# Patient Record
Sex: MALE | Race: WHITE | NOT HISPANIC OR LATINO | Employment: OTHER | URBAN - METROPOLITAN AREA
[De-identification: names, ages, dates, MRNs, and addresses within clinical notes are randomized per-mention and may not be internally consistent; named-entity substitution may affect disease eponyms.]

---

## 2023-07-13 ENCOUNTER — APPOINTMENT (OUTPATIENT)
Dept: RADIOLOGY | Facility: MEDICAL CENTER | Age: 78
DRG: 023 | End: 2023-07-13
Attending: PSYCHIATRY & NEUROLOGY
Payer: MEDICARE

## 2023-07-13 ENCOUNTER — APPOINTMENT (OUTPATIENT)
Dept: RADIOLOGY | Facility: MEDICAL CENTER | Age: 78
DRG: 023 | End: 2023-07-13
Attending: STUDENT IN AN ORGANIZED HEALTH CARE EDUCATION/TRAINING PROGRAM
Payer: MEDICARE

## 2023-07-13 ENCOUNTER — HOSPITAL ENCOUNTER (INPATIENT)
Facility: MEDICAL CENTER | Age: 78
LOS: 13 days | DRG: 023 | End: 2023-07-27
Attending: STUDENT IN AN ORGANIZED HEALTH CARE EDUCATION/TRAINING PROGRAM | Admitting: INTERNAL MEDICINE
Payer: MEDICARE

## 2023-07-13 DIAGNOSIS — I63.512 ACUTE ISCHEMIC LEFT MCA STROKE (HCC): ICD-10-CM

## 2023-07-13 DIAGNOSIS — R29.810 FACIAL DROOP: ICD-10-CM

## 2023-07-13 DIAGNOSIS — I63.9 ACUTE CVA (CEREBROVASCULAR ACCIDENT) (HCC): ICD-10-CM

## 2023-07-13 DIAGNOSIS — R58 BLOOD LOSS: ICD-10-CM

## 2023-07-13 DIAGNOSIS — R53.1 WEAKNESS: ICD-10-CM

## 2023-07-13 DIAGNOSIS — R47.1 DYSARTHRIA: ICD-10-CM

## 2023-07-13 DIAGNOSIS — I65.22 LEFT CAROTID ARTERY STENOSIS: ICD-10-CM

## 2023-07-13 LAB
ABO GROUP BLD: NORMAL
ALBUMIN SERPL BCP-MCNC: 4.3 G/DL (ref 3.2–4.9)
ALBUMIN/GLOB SERPL: 1.8 G/DL
ALP SERPL-CCNC: 73 U/L (ref 30–99)
ALT SERPL-CCNC: 18 U/L (ref 2–50)
ANION GAP SERPL CALC-SCNC: 9 MMOL/L (ref 7–16)
APTT PPP: 28.4 SEC (ref 24.7–36)
AST SERPL-CCNC: 26 U/L (ref 12–45)
BASOPHILS # BLD AUTO: 0.7 % (ref 0–1.8)
BASOPHILS # BLD: 0.04 K/UL (ref 0–0.12)
BILIRUB SERPL-MCNC: 0.4 MG/DL (ref 0.1–1.5)
BLD GP AB SCN SERPL QL: NORMAL
BUN SERPL-MCNC: 25 MG/DL (ref 8–22)
CALCIUM ALBUM COR SERPL-MCNC: 9.3 MG/DL (ref 8.5–10.5)
CALCIUM SERPL-MCNC: 9.5 MG/DL (ref 8.5–10.5)
CHLORIDE SERPL-SCNC: 101 MMOL/L (ref 96–112)
CO2 SERPL-SCNC: 26 MMOL/L (ref 20–33)
CREAT SERPL-MCNC: 1.15 MG/DL (ref 0.5–1.4)
EOSINOPHIL # BLD AUTO: 0.17 K/UL (ref 0–0.51)
EOSINOPHIL NFR BLD: 3 % (ref 0–6.9)
ERYTHROCYTE [DISTWIDTH] IN BLOOD BY AUTOMATED COUNT: 40.6 FL (ref 35.9–50)
GFR SERPLBLD CREATININE-BSD FMLA CKD-EPI: 66 ML/MIN/1.73 M 2
GLOBULIN SER CALC-MCNC: 2.4 G/DL (ref 1.9–3.5)
GLUCOSE SERPL-MCNC: 117 MG/DL (ref 65–99)
HCT VFR BLD AUTO: 41.2 % (ref 42–52)
HGB BLD-MCNC: 14.6 G/DL (ref 14–18)
IMM GRANULOCYTES # BLD AUTO: 0.03 K/UL (ref 0–0.11)
IMM GRANULOCYTES NFR BLD AUTO: 0.5 % (ref 0–0.9)
INR PPP: 1.11 (ref 0.87–1.13)
LYMPHOCYTES # BLD AUTO: 1.53 K/UL (ref 1–4.8)
LYMPHOCYTES NFR BLD: 26.9 % (ref 22–41)
MCH RBC QN AUTO: 31.1 PG (ref 27–33)
MCHC RBC AUTO-ENTMCNC: 35.4 G/DL (ref 32.3–36.5)
MCV RBC AUTO: 87.7 FL (ref 81.4–97.8)
MONOCYTES # BLD AUTO: 0.58 K/UL (ref 0–0.85)
MONOCYTES NFR BLD AUTO: 10.2 % (ref 0–13.4)
NEUTROPHILS # BLD AUTO: 3.33 K/UL (ref 1.82–7.42)
NEUTROPHILS NFR BLD: 58.7 % (ref 44–72)
NRBC # BLD AUTO: 0 K/UL
NRBC BLD-RTO: 0 /100 WBC (ref 0–0.2)
PLATELET # BLD AUTO: 185 K/UL (ref 164–446)
PMV BLD AUTO: 9.4 FL (ref 9–12.9)
POTASSIUM SERPL-SCNC: 4.3 MMOL/L (ref 3.6–5.5)
PROT SERPL-MCNC: 6.7 G/DL (ref 6–8.2)
PROTHROMBIN TIME: 14.2 SEC (ref 12–14.6)
RBC # BLD AUTO: 4.7 M/UL (ref 4.7–6.1)
RH BLD: NORMAL
SODIUM SERPL-SCNC: 136 MMOL/L (ref 135–145)
TROPONIN T SERPL-MCNC: 13 NG/L (ref 6–19)
WBC # BLD AUTO: 5.7 K/UL (ref 4.8–10.8)

## 2023-07-13 PROCEDURE — 84484 ASSAY OF TROPONIN QUANT: CPT

## 2023-07-13 PROCEDURE — 700111 HCHG RX REV CODE 636 W/ 250 OVERRIDE (IP): Mod: JW | Performed by: PSYCHIATRY & NEUROLOGY

## 2023-07-13 PROCEDURE — 85610 PROTHROMBIN TIME: CPT

## 2023-07-13 PROCEDURE — 86850 RBC ANTIBODY SCREEN: CPT

## 2023-07-13 PROCEDURE — 85347 COAGULATION TIME ACTIVATED: CPT

## 2023-07-13 PROCEDURE — 86901 BLOOD TYPING SEROLOGIC RH(D): CPT

## 2023-07-13 PROCEDURE — 700117 HCHG RX CONTRAST REV CODE 255: Performed by: STUDENT IN AN ORGANIZED HEALTH CARE EDUCATION/TRAINING PROGRAM

## 2023-07-13 PROCEDURE — 85576 BLOOD PLATELET AGGREGATION: CPT | Mod: 91

## 2023-07-13 PROCEDURE — 70498 CT ANGIOGRAPHY NECK: CPT

## 2023-07-13 PROCEDURE — 0042T CT-CEREBRAL PERFUSION ANALYSIS: CPT

## 2023-07-13 PROCEDURE — 93005 ELECTROCARDIOGRAM TRACING: CPT | Performed by: STUDENT IN AN ORGANIZED HEALTH CARE EDUCATION/TRAINING PROGRAM

## 2023-07-13 PROCEDURE — 71045 X-RAY EXAM CHEST 1 VIEW: CPT

## 2023-07-13 PROCEDURE — 85025 COMPLETE CBC W/AUTO DIFF WBC: CPT

## 2023-07-13 PROCEDURE — 80053 COMPREHEN METABOLIC PANEL: CPT

## 2023-07-13 PROCEDURE — 85384 FIBRINOGEN ACTIVITY: CPT

## 2023-07-13 PROCEDURE — 36415 COLL VENOUS BLD VENIPUNCTURE: CPT

## 2023-07-13 PROCEDURE — 99285 EMERGENCY DEPT VISIT HI MDM: CPT | Performed by: PSYCHIATRY & NEUROLOGY

## 2023-07-13 PROCEDURE — 70450 CT HEAD/BRAIN W/O DYE: CPT

## 2023-07-13 PROCEDURE — 99291 CRITICAL CARE FIRST HOUR: CPT

## 2023-07-13 PROCEDURE — 37195 THROMBOLYTIC THERAPY STROKE: CPT

## 2023-07-13 PROCEDURE — 70496 CT ANGIOGRAPHY HEAD: CPT

## 2023-07-13 PROCEDURE — 86900 BLOOD TYPING SEROLOGIC ABO: CPT

## 2023-07-13 PROCEDURE — 85730 THROMBOPLASTIN TIME PARTIAL: CPT

## 2023-07-13 RX ADMIN — TENECTEPLASE 23 MG: KIT at 23:31

## 2023-07-13 RX ADMIN — IOHEXOL 40 ML: 350 INJECTION, SOLUTION INTRAVENOUS at 23:10

## 2023-07-13 RX ADMIN — IOHEXOL 80 ML: 350 INJECTION, SOLUTION INTRAVENOUS at 23:12

## 2023-07-14 ENCOUNTER — APPOINTMENT (OUTPATIENT)
Dept: RADIOLOGY | Facility: MEDICAL CENTER | Age: 78
DRG: 023 | End: 2023-07-14
Attending: INTERNAL MEDICINE
Payer: MEDICARE

## 2023-07-14 ENCOUNTER — APPOINTMENT (OUTPATIENT)
Dept: CARDIOLOGY | Facility: MEDICAL CENTER | Age: 78
DRG: 023 | End: 2023-07-14
Attending: INTERNAL MEDICINE
Payer: MEDICARE

## 2023-07-14 PROBLEM — I65.22 LEFT CAROTID ARTERY STENOSIS: Status: ACTIVE | Noted: 2023-07-14

## 2023-07-14 PROBLEM — I16.0 HYPERTENSIVE URGENCY: Status: ACTIVE | Noted: 2023-07-14

## 2023-07-14 PROBLEM — I63.512 ACUTE ISCHEMIC LEFT MCA STROKE (HCC): Status: ACTIVE | Noted: 2023-07-14

## 2023-07-14 LAB
ABO + RH BLD: NORMAL
ANION GAP SERPL CALC-SCNC: 11 MMOL/L (ref 7–16)
BUN SERPL-MCNC: 20 MG/DL (ref 8–22)
CALCIUM SERPL-MCNC: 9.2 MG/DL (ref 8.5–10.5)
CFT BLD TEG: 5.9 MIN (ref 4.6–9.1)
CFT P HPASE BLD TEG: 6.5 MIN (ref 4.3–8.3)
CHLORIDE SERPL-SCNC: 103 MMOL/L (ref 96–112)
CHOLEST SERPL-MCNC: 161 MG/DL (ref 100–199)
CLOT ANGLE BLD TEG: 72.5 DEGREES (ref 63–78)
CO2 SERPL-SCNC: 23 MMOL/L (ref 20–33)
CREAT SERPL-MCNC: 0.97 MG/DL (ref 0.5–1.4)
CT.EXTRINSIC BLD ROTEM: 1.3 MIN (ref 0.8–2.1)
EKG IMPRESSION: NORMAL
GFR SERPLBLD CREATININE-BSD FMLA CKD-EPI: 81 ML/MIN/1.73 M 2
GLUCOSE SERPL-MCNC: 115 MG/DL (ref 65–99)
HDLC SERPL-MCNC: 44 MG/DL
LDLC SERPL CALC-MCNC: 104 MG/DL
LV EJECT FRACT  99904: 60
LV EJECT FRACT MOD 2C 99903: 73.75
LV EJECT FRACT MOD 4C 99902: 59.84
LV EJECT FRACT MOD BP 99901: 67.22
MAGNESIUM SERPL-MCNC: 2.1 MG/DL (ref 1.5–2.5)
MCF BLD TEG: 59.1 MM (ref 52–69)
MCF.PLATELET INHIB BLD ROTEM: 20.7 MM (ref 15–32)
PA AA BLD-ACNC: 13.6 % (ref 0–11)
PA ADP BLD-ACNC: 7.2 % (ref 0–17)
POTASSIUM SERPL-SCNC: 3.5 MMOL/L (ref 3.6–5.5)
SODIUM SERPL-SCNC: 137 MMOL/L (ref 135–145)
TEG ALGORITHM TGALG: ABNORMAL
TRIGL SERPL-MCNC: 67 MG/DL (ref 0–149)

## 2023-07-14 PROCEDURE — 700111 HCHG RX REV CODE 636 W/ 250 OVERRIDE (IP): Mod: JZ | Performed by: PSYCHIATRY & NEUROLOGY

## 2023-07-14 PROCEDURE — 700101 HCHG RX REV CODE 250: Performed by: NURSE PRACTITIONER

## 2023-07-14 PROCEDURE — 70551 MRI BRAIN STEM W/O DYE: CPT

## 2023-07-14 PROCEDURE — A9270 NON-COVERED ITEM OR SERVICE: HCPCS | Performed by: PSYCHIATRY & NEUROLOGY

## 2023-07-14 PROCEDURE — 99292 CRITICAL CARE ADDL 30 MIN: CPT | Performed by: INTERNAL MEDICINE

## 2023-07-14 PROCEDURE — 93306 TTE W/DOPPLER COMPLETE: CPT

## 2023-07-14 PROCEDURE — C1760 CLOSURE DEV, VASC: HCPCS

## 2023-07-14 PROCEDURE — 93306 TTE W/DOPPLER COMPLETE: CPT | Mod: 26 | Performed by: INTERNAL MEDICINE

## 2023-07-14 PROCEDURE — 80048 BASIC METABOLIC PNL TOTAL CA: CPT

## 2023-07-14 PROCEDURE — 99291 CRITICAL CARE FIRST HOUR: CPT | Performed by: INTERNAL MEDICINE

## 2023-07-14 PROCEDURE — 03CL3ZZ EXTIRPATION OF MATTER FROM LEFT INTERNAL CAROTID ARTERY, PERCUTANEOUS APPROACH: ICD-10-PCS | Performed by: RADIOLOGY

## 2023-07-14 PROCEDURE — 700105 HCHG RX REV CODE 258: Mod: JZ | Performed by: NURSE PRACTITIONER

## 2023-07-14 PROCEDURE — 99233 SBSQ HOSP IP/OBS HIGH 50: CPT | Performed by: PSYCHIATRY & NEUROLOGY

## 2023-07-14 PROCEDURE — 70450 CT HEAD/BRAIN W/O DYE: CPT

## 2023-07-14 PROCEDURE — 700102 HCHG RX REV CODE 250 W/ 637 OVERRIDE(OP): Performed by: PSYCHIATRY & NEUROLOGY

## 2023-07-14 PROCEDURE — B3171ZZ FLUOROSCOPY OF LEFT INTERNAL CAROTID ARTERY USING LOW OSMOLAR CONTRAST: ICD-10-PCS | Performed by: RADIOLOGY

## 2023-07-14 PROCEDURE — 80061 LIPID PANEL: CPT

## 2023-07-14 PROCEDURE — 770022 HCHG ROOM/CARE - ICU (200)

## 2023-07-14 PROCEDURE — 700117 HCHG RX CONTRAST REV CODE 255: Performed by: PSYCHIATRY & NEUROLOGY

## 2023-07-14 PROCEDURE — 92610 EVALUATE SWALLOWING FUNCTION: CPT

## 2023-07-14 PROCEDURE — 700111 HCHG RX REV CODE 636 W/ 250 OVERRIDE (IP)

## 2023-07-14 PROCEDURE — 83735 ASSAY OF MAGNESIUM: CPT

## 2023-07-14 RX ORDER — HYDRALAZINE HYDROCHLORIDE 20 MG/ML
10 INJECTION INTRAMUSCULAR; INTRAVENOUS
Status: DISCONTINUED | OUTPATIENT
Start: 2023-07-14 | End: 2023-07-14

## 2023-07-14 RX ORDER — HYDRALAZINE HYDROCHLORIDE 20 MG/ML
20 INJECTION INTRAMUSCULAR; INTRAVENOUS
Status: DISCONTINUED | OUTPATIENT
Start: 2023-07-14 | End: 2023-07-14

## 2023-07-14 RX ORDER — AMOXICILLIN 250 MG
2 CAPSULE ORAL 2 TIMES DAILY
Status: DISCONTINUED | OUTPATIENT
Start: 2023-07-14 | End: 2023-07-15

## 2023-07-14 RX ORDER — ONDANSETRON 2 MG/ML
4 INJECTION INTRAMUSCULAR; INTRAVENOUS EVERY 4 HOURS PRN
Status: DISCONTINUED | OUTPATIENT
Start: 2023-07-14 | End: 2023-07-27 | Stop reason: HOSPADM

## 2023-07-14 RX ORDER — POLYETHYLENE GLYCOL 3350 17 G/17G
1 POWDER, FOR SOLUTION ORAL
Status: DISCONTINUED | OUTPATIENT
Start: 2023-07-14 | End: 2023-07-15

## 2023-07-14 RX ORDER — HYDRALAZINE HYDROCHLORIDE 20 MG/ML
5 INJECTION INTRAMUSCULAR; INTRAVENOUS ONCE
Status: COMPLETED | OUTPATIENT
Start: 2023-07-14 | End: 2023-07-14

## 2023-07-14 RX ORDER — LABETALOL HYDROCHLORIDE 5 MG/ML
10 INJECTION, SOLUTION INTRAVENOUS
Status: DISCONTINUED | OUTPATIENT
Start: 2023-07-14 | End: 2023-07-14

## 2023-07-14 RX ORDER — NOREPINEPHRINE BITARTRATE 0.03 MG/ML
0-1 INJECTION, SOLUTION INTRAVENOUS CONTINUOUS
Status: DISCONTINUED | OUTPATIENT
Start: 2023-07-14 | End: 2023-07-17

## 2023-07-14 RX ORDER — HYDRALAZINE HYDROCHLORIDE 20 MG/ML
20 INJECTION INTRAMUSCULAR; INTRAVENOUS
Status: DISCONTINUED | OUTPATIENT
Start: 2023-07-14 | End: 2023-07-27 | Stop reason: HOSPADM

## 2023-07-14 RX ORDER — ATORVASTATIN CALCIUM 40 MG/1
40 TABLET, FILM COATED ORAL EVERY EVENING
Status: DISCONTINUED | OUTPATIENT
Start: 2023-07-14 | End: 2023-07-15

## 2023-07-14 RX ORDER — BISACODYL 10 MG
10 SUPPOSITORY, RECTAL RECTAL
Status: DISCONTINUED | OUTPATIENT
Start: 2023-07-14 | End: 2023-07-15

## 2023-07-14 RX ORDER — ENOXAPARIN SODIUM 100 MG/ML
40 INJECTION SUBCUTANEOUS DAILY
Status: DISCONTINUED | OUTPATIENT
Start: 2023-07-15 | End: 2023-07-27 | Stop reason: HOSPADM

## 2023-07-14 RX ORDER — ASPIRIN 300 MG/1
300 SUPPOSITORY RECTAL DAILY
Status: DISCONTINUED | OUTPATIENT
Start: 2023-07-15 | End: 2023-07-15

## 2023-07-14 RX ORDER — LABETALOL HYDROCHLORIDE 5 MG/ML
10-20 INJECTION, SOLUTION INTRAVENOUS
Status: DISCONTINUED | OUTPATIENT
Start: 2023-07-14 | End: 2023-07-14

## 2023-07-14 RX ORDER — LABETALOL HYDROCHLORIDE 5 MG/ML
10-20 INJECTION, SOLUTION INTRAVENOUS
Status: DISCONTINUED | OUTPATIENT
Start: 2023-07-14 | End: 2023-07-27 | Stop reason: HOSPADM

## 2023-07-14 RX ORDER — HYDRALAZINE HYDROCHLORIDE 20 MG/ML
INJECTION INTRAMUSCULAR; INTRAVENOUS
Status: COMPLETED
Start: 2023-07-14 | End: 2023-07-14

## 2023-07-14 RX ORDER — ONDANSETRON 4 MG/1
4 TABLET, ORALLY DISINTEGRATING ORAL EVERY 4 HOURS PRN
Status: DISCONTINUED | OUTPATIENT
Start: 2023-07-14 | End: 2023-07-15

## 2023-07-14 RX ORDER — ASPIRIN 81 MG/1
81 TABLET, CHEWABLE ORAL DAILY
Status: DISCONTINUED | OUTPATIENT
Start: 2023-07-15 | End: 2023-07-14

## 2023-07-14 RX ADMIN — NOREPINEPHRINE BITARTRATE 0.05 MCG/KG/MIN: 1 INJECTION, SOLUTION, CONCENTRATE INTRAVENOUS at 10:05

## 2023-07-14 RX ADMIN — HYDRALAZINE HYDROCHLORIDE 5 MG: 20 INJECTION INTRAMUSCULAR; INTRAVENOUS at 00:42

## 2023-07-14 RX ADMIN — ASPIRIN 300 MG: 300 SUPPOSITORY RECTAL at 23:53

## 2023-07-14 RX ADMIN — HYDRALAZINE HYDROCHLORIDE 5 MG: 20 INJECTION, SOLUTION INTRAMUSCULAR; INTRAVENOUS at 00:42

## 2023-07-14 RX ADMIN — ENOXAPARIN SODIUM 40 MG: 100 INJECTION SUBCUTANEOUS at 23:53

## 2023-07-14 RX ADMIN — IOHEXOL 40 ML: 300 INJECTION, SOLUTION INTRAVENOUS at 01:15

## 2023-07-14 RX ADMIN — FENTANYL CITRATE 25 MCG: 50 INJECTION, SOLUTION INTRAMUSCULAR; INTRAVENOUS at 00:27

## 2023-07-14 ASSESSMENT — ENCOUNTER SYMPTOMS
BRUISES/BLEEDS EASILY: 0
FLANK PAIN: 0
BACK PAIN: 0
FEVER: 0
SORE THROAT: 0
HEADACHES: 0
CHILLS: 0
DOUBLE VISION: 0
NERVOUS/ANXIOUS: 0
NECK PAIN: 0
DEPRESSION: 0
NAUSEA: 0
ABDOMINAL PAIN: 0
DIZZINESS: 0
SHORTNESS OF BREATH: 0
COUGH: 0
BLURRED VISION: 0
WEAKNESS: 0
VOMITING: 0

## 2023-07-14 ASSESSMENT — PAIN DESCRIPTION - PAIN TYPE
TYPE: ACUTE PAIN

## 2023-07-14 ASSESSMENT — FIBROSIS 4 INDEX: FIB4 SCORE: 2.48

## 2023-07-14 NOTE — CARE PLAN
The patient is Watcher - Medium risk of patient condition declining or worsening         Progress made toward(s) clinical / shift goals:  Neurologic assessments, groin/CMS checks performed and charted per post-thrombolytic/thrombectomy protocol. SBP goal 120-160. Neurologic exam waxing/waning. Stat CT performed for neuro change.     Patient is not progressing towards the following goals:      Problem: Optimal Care of the Stroke Patient  Goal: Optimal acute care for the stroke patient  Outcome: Progressing     Problem: Psychosocial - Patient Condition  Goal: Patient's ability to verbalize feelings about condition will improve  Outcome: Progressing     Problem: Urinary Elimination  Goal: Establish and maintain regular urinary output  Outcome: Progressing     Problem: Pain - Standard  Goal: Alleviation of pain or a reduction in pain to the patient’s comfort goal  Outcome: Progressing

## 2023-07-14 NOTE — PROGRESS NOTES
Radiology Progress Note   Author: RUTH Park Date & Time created: 7/14/2023  8:59 AM   Date of admission  7/13/2023  Note to reader: this note follows the APSO format rather than the historical SOAP format. Assessment and plan located at the top of the note for ease of use.    Chief Complaint  77 y.o. male admitted 7/13/2023 with   Chief Complaint   Patient presents with    Possible Stroke     BIB [facility] from his home in [place]. Pt had a GLF at 2100, no trauma noted. LKW 2120, pt was found to have right sided weakness, right facial droop and dysarthria. GCS 15.   No thinners          HPI  77-year-old male brought in due to right-sided weakness arm and leg and face.  Severe dysarthria.  Symptom onset witnessed by family at 2120 on 07/13/23. CT showed left MCA occlusion, CTA showed acute occlusion the distal left internal carotid artery and left M1 segment. Pt underwent a cerebral angiogram and mechanical thrombectomy of left MCA and Left ICA with TICI 2C recanalization on 07/13/23 with Dr Puri.     Interval History:   07/14/23 - Pt lethargic, with continued Right sided weakness and slurred speech. CTA this morning shows no acute infarct, hemorrhage or mass and no evidence of MCA reocclusion. Right femoral access site soft, non-tender; dressing cdi.       Assessment/Plan  Interval History   Principal Problem:    Acute ischemic left MCA stroke (HCC)  Active Problems:    Hypertensive urgency    Left carotid artery stenosis      Plan IR  - Neuro checks per ICU protocol  - Keep SBP between 120-160  - Secondary stroke prevention per Neurology recommendations  - Pending MRI   - Pending PT/OT/ Swallow eval   -  -Thank you for allowing Interventional Radiology team to participate in the patients care, if any additional care or requests are needed in the future please do not hesitate call or place IR order   [phone]      IR:            Review of Systems  Physical Exam   Review of Systems    Reason unable to perform ROS: lethargic.      Vitals:    07/14/23 0630   BP: 111/71   Pulse: 73   Resp: 18   Temp:    SpO2: 97%        Physical Exam  Cardiovascular:      Rate and Rhythm: Normal rate.      Pulses: Normal pulses.   Pulmonary:      Effort: Pulmonary effort is normal.   Abdominal:      General: Abdomen is flat.      Palpations: Abdomen is soft.   Musculoskeletal:      Comments: RUE 0/5  RLE 1/5  LUE 5/5  LLE 5/5   Skin:     General: Skin is warm and dry.      Comments: Right groin IR access site soft, non-tender; dressing cdi.    Neurological:      Mental Status: He is lethargic.      Comments: Right sided facial droop  Right sided weakness  L gaze preference  Lethargic but follows commands.   Slurred speech.              Labs    Recent Labs     07/13/23 2255   WBC 5.7   RBC 4.70   HEMOGLOBIN 14.6   HEMATOCRIT 41.2*   MCV 87.7   MCH 31.1   MCHC 35.4   RDW 40.6   PLATELETCT 185   MPV 9.4     Recent Labs     07/13/23 2255 07/14/23  0405   SODIUM 136 137   POTASSIUM 4.3 3.5*   CHLORIDE 101 103   CO2 26 23   GLUCOSE 117* 115*   BUN 25* 20   CREATININE 1.15 0.97   CALCIUM 9.5 9.2     Recent Labs     07/13/23 2255 07/14/23  0405   ALBUMIN 4.3  --    TBILIRUBIN 0.4  --    ALKPHOSPHAT 73  --    TOTPROTEIN 6.7  --    ALTSGPT 18  --    ASTSGOT 26  --    CREATININE 1.15 0.97     CT-HEAD W/O   Final Result      1. No CT evidence of acute infarct, hemorrhage or mass. No loss of gray-white matter differentiation.   2. No evidence of MCA reocclusion on this study.   3. Mild global parenchymal atrophy. Chronic small vessel ischemic changes.      DX-CHEST-PORTABLE (1 VIEW)   Final Result      No acute cardiopulmonary abnormality.         CT-CTA NECK WITH & W/O-POST PROCESSING   Final Result      1.  Sluggish flow in the distal left internal carotid artery, with occlusion of the carotid terminus.   2.  No high-grade stenosis or occlusion of the proximal left internal carotid artery.   3.  Patent right carotid and  vertebral arteries, without occlusion, aneurysm or high-grade stenosis.      CT-CTA HEAD WITH & W/O-POST PROCESS   Final Result      Acute occlusion the distal left internal carotid artery and left M1 segment.      CT-CEREBRAL PERFUSION ANALYSIS   Final Result      1.  Cerebral blood flow less than 30% likely representing completed infarct = 14 mL.      2.  T Max more than 6 seconds likely representing combination of completed infarct and ischemia = 194 mL.      3.  Mismatched volume likely representing ischemic brain/penumbra = 180 mL.      4.  Please note that the cerebral perfusion was performed on the limited brain tissue around the basal ganglia region. Infarct/ischemia outside the CT perfusion sections can be missed in this study.      CT-HEAD W/O   Final Result   Addendum (preliminary) 1 of 1   Findings were communicated to Dr. Fox via [employer] system on    7/13/2023 11:17 PM.      Final      1.  Hyperdense left MCA, consistent with left MCA occlusion.   2.  No loss of gray-white matter differentiation at this time.   3.  Mild atrophy and chronic small vessel ischemic changes.         IR-THROMBO MECHANICAL ARTERY,INIT    (Results Pending)   MR-BRAIN-W/O    (Results Pending)   CT-HEAD W/O    (Results Pending)   EC-ECHOCARDIOGRAM COMPLETE W/O CONT    (Results Pending)       INR   Date Value Ref Range Status   07/13/2023 1.11 0.87 - 1.13 Final     Comment:     INR - Non-therapeutic Reference Range: 0.87-1.13  INR - Therapeutic Reference Range: 2.0-4.0       No results found for: POCINR     Intake/Output Summary (Last 24 hours) at 7/14/2023 0859  Last data filed at 7/14/2023 0600  Gross per 24 hour   Intake 0 ml   Output 750 ml   Net -750 ml      Labs not explicitly included in this progress note were reviewed by the author. Radiology/imaging not explicitly included in this progress note was reviewed by the author.     I have performed a physical exam and reviewed and updated ROS and Plan today  (7/14/2023).     45 minutes in directly providing and coordinating care and extensive data review.  No time overlap and excludes procedures.

## 2023-07-14 NOTE — PROGRESS NOTES
Neurology Progress Note  Neurohospitalist Service, [facility]    Referring Physician: Rosemary Kirkpatrick M.D.    Chief Complaint   Patient presents with    Possible Stroke     BIB [facility] from his home in [place]. Pt had a GLF at 2100, no trauma noted. LKW 2120, pt was found to have right sided weakness, right facial droop and dysarthria. GCS 15.   No thinners        HPI: Refer to initial documented Neurology H&P, as detailed in the patient's chart.    Interval History 7/14: Per the nurse patient had sustained antigravity  right upper extremity after IR.  Now nearly plegic.    Review of systems: In addition to what is detailed in the HPI and/or updated in the interval history, all other Medications:    Current Facility-Administered Medications:     senna-docusate (Pericolace Or Senokot S) 8.6-50 MG per tablet 2 Tablet, 2 Tablet, Oral, BID **AND** polyethylene glycol/lytes (Miralax) PACKET 1 Packet, 1 Packet, Oral, QDAY PRN **AND** magnesium hydroxide (Milk Of Magnesia) suspension 30 mL, 30 mL, Oral, QDAY PRN **AND** bisacodyl (Dulcolax) suppository 10 mg, 10 mg, Rectal, QDAY PRN, Rosemary Kirkpatrick M.D.    ondansetron (Zofran) syringe/vial injection 4 mg, 4 mg, Intravenous, Q4HRS PRN, Rosemary Kirkpatrick M.D.    ondansetron (Zofran ODT) dispertab 4 mg, 4 mg, Oral, Q4HRS PRN, Rosemary Kirkpatrick M.D.    atorvastatin (Lipitor) tablet 40 mg, 40 mg, Oral, Q EVENING, Rosemary Kirkpatrick M.D.    niCARdipine (Cardene) 25 mg in  mL Standard Infusion, 0-15 mg/hr, Intravenous, Continuous, Rosemary Kirkpatrick M.D., Held at 07/14/23 0115    hydrALAZINE (Apresoline) injection 20 mg, 20 mg, Intravenous, Q2HRS PRN, Rosemary Kirkpatrick M.D.    labetalol (Normodyne/Trandate) injection 10-20 mg, 10-20 mg, Intravenous, Q10 MIN PRN, Rosemary Kirkpatrick M.D.    Physical Examination:     Vitals:    07/14/23 0400 07/14/23 0430 07/14/23 0500 07/14/23 0530   BP: 121/73 (!) 148/87 134/72 118/73   Pulse: 98 92 73 94    Resp: (!) 50 17 17 19   Temp: 36.2 °C (97.1 °F)      TempSrc: Temporal      SpO2: 97% 98% 96% 98%   Weight:       Height:             NEUROLOGICAL EXAM:     Patient will awaken to verbal stimuli.  Wants to go home he says.  He will follow commands.  Stable telling his name.  Able to name object.  Severely dysarthric.  He tries to repeat but I am able to understand him.  Cranial nerves II to XII shows a right facial droop.  He has a left gaze preference that he is able to overcome past midline with encouragement.  Severe dysarthria.  Pupils are equal reactive.  Motor examination he has sustained antigravity in the left arm and leg.  The right arm is plegic though he has moments of-antigravity strength briefly at times.  The right lower extremity he is briefly antigravity  Sensation is decreased on the right compared to left.  He does have neglect to the left side.  Toes upgoing on both sides.  Cannot assess gait due to weakness       NIH Stroke Scale:    1a. Level of Consciousness:  0    1b. LOC Questions (Month, age):  1    1c. LOC Commands (Open/close eyes make fist/let go):  0    2.   Best Gaze (Eyes open - patient follows examiner's finger on face):  1  3.   Visual Fields (introduce visual stimulus/threat to patient's field quadrants):   0  4.   Facial Paresis (Show teeth, raise eyebrows and squeeze eyes shut):  1  5a. Motor Arm - Left (Elevate arm to 90 degrees if patient is sitting, 45 degrees if  Supine):  0  5b. Motor Arm - Right (Elevate arm to 90 degrees if patient is sitting, 45 degrees if supine): 4  6a. Motor Leg - Left (Elevate leg 30 degrees with patient supine):  0  6b. Motor Leg - Right  (Elevate leg 30 degrees with patient supine):  2  7.   Limb Ataxia (Finger-nose, heel down shin):  0    8.   Sensory (Pin prick to face, arm, trunk and leg - compare side to side):  1  9.  Best Language (Name item, describe a picture and read sentences):  1  10. Dysarthria (Evaluate speech clarity by patient  repeating listed words):  2  11. Extinction and Inattention (Use information from prior testing to identify neglect or double simultaneous stimuli testing):  1    Total NIH Score:  14                  Objective Data:    Labs:  Lab Results   Component Value Date/Time    PROTHROMBTM 14.2 07/13/2023 10:55 PM    INR 1.11 07/13/2023 10:55 PM      Lab Results   Component Value Date/Time    WBC 5.7 07/13/2023 10:55 PM    RBC 4.70 07/13/2023 10:55 PM    HEMOGLOBIN 14.6 07/13/2023 10:55 PM    HEMATOCRIT 41.2 (L) 07/13/2023 10:55 PM    MCV 87.7 07/13/2023 10:55 PM    MCH 31.1 07/13/2023 10:55 PM    MCHC 35.4 07/13/2023 10:55 PM    MPV 9.4 07/13/2023 10:55 PM    NEUTSPOLYS 58.70 07/13/2023 10:55 PM    LYMPHOCYTES 26.90 07/13/2023 10:55 PM    MONOCYTES 10.20 07/13/2023 10:55 PM    EOSINOPHILS 3.00 07/13/2023 10:55 PM    BASOPHILS 0.70 07/13/2023 10:55 PM      Lab Results   Component Value Date/Time    SODIUM 137 07/14/2023 04:05 AM    POTASSIUM 3.5 (L) 07/14/2023 04:05 AM    CHLORIDE 103 07/14/2023 04:05 AM    CO2 23 07/14/2023 04:05 AM    GLUCOSE 115 (H) 07/14/2023 04:05 AM    BUN 20 07/14/2023 04:05 AM    CREATININE 0.97 07/14/2023 04:05 AM      Lab Results   Component Value Date/Time    CHOLSTRLTOT 161 07/14/2023 04:05 AM     (H) 07/14/2023 04:05 AM    HDL 44 07/14/2023 04:05 AM    TRIGLYCERIDE 67 07/14/2023 04:05 AM       Lab Results   Component Value Date/Time    ALKPHOSPHAT 73 07/13/2023 10:55 PM    ASTSGOT 26 07/13/2023 10:55 PM    ALTSGPT 18 07/13/2023 10:55 PM    TBILIRUBIN 0.4 07/13/2023 10:55 PM        Imaging/Testing:  CT-HEAD W/O   Final Result      1. No CT evidence of acute infarct, hemorrhage or mass. No loss of gray-white matter differentiation.   2. No evidence of MCA reocclusion on this study.   3. Mild global parenchymal atrophy. Chronic small vessel ischemic changes.      DX-CHEST-PORTABLE (1 VIEW)   Final Result      No acute cardiopulmonary abnormality.         CT-CTA NECK WITH & W/O-POST  PROCESSING   Final Result      1.  Sluggish flow in the distal left internal carotid artery, with occlusion of the carotid terminus.   2.  No high-grade stenosis or occlusion of the proximal left internal carotid artery.   3.  Patent right carotid and vertebral arteries, without occlusion, aneurysm or high-grade stenosis.      CT-CTA HEAD WITH & W/O-POST PROCESS   Final Result      Acute occlusion the distal left internal carotid artery and left M1 segment.      CT-CEREBRAL PERFUSION ANALYSIS   Final Result      1.  Cerebral blood flow less than 30% likely representing completed infarct = 14 mL.      2.  T Max more than 6 seconds likely representing combination of completed infarct and ischemia = 194 mL.      3.  Mismatched volume likely representing ischemic brain/penumbra = 180 mL.      4.  Please note that the cerebral perfusion was performed on the limited brain tissue around the basal ganglia region. Infarct/ischemia outside the CT perfusion sections can be missed in this study.      CT-HEAD W/O   Final Result   Addendum (preliminary) 1 of 1   Findings were communicated to Dr. Fox via [employer] system on    7/13/2023 11:17 PM.      Final      1.  Hyperdense left MCA, consistent with left MCA occlusion.   2.  No loss of gray-white matter differentiation at this time.   3.  Mild atrophy and chronic small vessel ischemic changes.         IR-THROMBO MECHANICAL ARTERY,INIT    (Results Pending)   MR-BRAIN-W/O    (Results Pending)   CT-HEAD W/O    (Results Pending)   EC-ECHOCARDIOGRAM COMPLETE W/O CONT    (Results Pending)         Assessment and Plan:    77-year-old male presenting with right-sided hemiparesis found to have a left M1 occlusion with distal ICA severe stenosis.  Status post thrombectomy and TNK.  Etiology is unknown at this time.  Patient stroke scale still 14.  However overall he appears slightly improved the neglect is improved.  The gaze preference is improved.  We will get MRI brain  today.  Will need to be on dual antiplatelet therapy given the severe stenosis in the distal ICA.    Plan:  1.  MRI brain  2.  Blood pressure parameters between 120-160 systolic  3.  Continue telemetry monitoring  4.  TTE without bubble  5.  Start high-dose statin, LDL is 104.  Goal should be below 70  6.  Maintain normal glycemia  7.  Speech/OT/PT okay to start today      Spent over 55 minutes reviewing the secondary CT from this morning.  Going over the care plan with the primary team to get MRI brain.  Start DAPT therapy if repeat imaging does not show any contraindications.      This chart was partially generated using voice recognition technology and sound alike word replacement may be present, best efforts were made to make the chart accurate.    Romel Rosario MD  Board Certified Neurology, ABPN  (t) [phone]

## 2023-07-14 NOTE — THERAPY
Speech Language Pathology   Clinical Swallow Evaluation     Patient Name: Ignacio Harvey  AGE:  77 y.o., SEX:  male  Medical Record #: [id number]  Date of Service: 2023      History of Present Illness  78 y/o M admitted 23 with ground-level fall without head trauma, followed by right upper extremity weakness, right lower extremity weakness, right facial droop, left gaze deviation, dysarthria., Found to have L M1 occlusion with distal ICA severe stenosis. Pt is now s/p TNK and thrombectomy w/ TICI2c results.     CTH : 1. No CT evidence of acute infarct, hemorrhage or mass. No loss of gray-white matter differentiation.  2. No evidence of MCA reocclusion on this study.  3. Mild global parenchymal atrophy. Chronic small vessel ischemic changes.    CXR : No acute cardiopulmonary abnormality.    PMHx: unknown    General Information:  Vitals  O2 Delivery Device: None - Room Air  Level of Consciousness: Drowsy, Lethargic  Patient Behaviors: Drowsy (Cooperative)  Orientation: Self,   Follows Directives: Yes - simple commands only    Prior Living Situation & Level of Function:  Housing / Facility: 1 Story Apartment / Condo  Lives with - Patient's Self Care Capacity: Significant Other  Communication: WFL  Swallowing: WFL     Oral Mechanism Evaluation:  Dentition: Poor, Upper denture (broken, decayed scattered lower dentition)   Facial Symmetry: Central right facial droop  Facial Sensation: Equal     Labial Observations: Right sided weakness   Lingual Observations: Midline (reduced ROM/strength on R)  Motor Speech: dysarthric at the word level    Laryngeal Function:  Secretion Management: Adequate  Voice Quality: WFL (hypophonic)  Cough: Pt did not follow commands to assess (vocalized instead of coughed ?apraxia)    Subjective  Patient kept eyes closed throughout the assessment though did respond to most simple questions and commands, able to state 1-2 words. Daughter and son at bedside, able to provide PLOF.  They report he has a goiter on L neck that he would sometimes lancet at home. He wasn't great about taking care of his teeth or getting medical care, but was able to consume a regular/thin diet without difficulty. They report his SO that he lives with is very controlling. 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    Assessment  Current Method of Nutrition: NPO until cleared by speech pathology  Positioning: Nelson's (60-90 degrees)  Bolus Administration: SLP  O2 Delivery Device: None - Room Air  Factor(s) Affecting Performance: Impaired endurance, Impaired mental status     Swallowing Trials:  Swallowing Trials  Ice: WFL  Thin Liquid (TN0): Impaired  Pureed (PU4): Impaired    Comments: Pt kept head turned to L side throughout the evaluation despite verbal/tactile cues for repositioning. He readily opened his mouth to accept PO trials. Reduced labial seal resulted in trace-min anterior bolus loss of thin liquids via spoon and trace bolus loss of purees on R labial surface. Suspect reduced bolus formation and posterior lingual propulsion due to min lingual residue w/ purees post swallow, though pt did clear with cleansing swallow when prompted. Suspect if head was in a neutral position vs turned to down to L, he would be more at risk for R sided pocketing. Pharyngeal swallow response appeared delayed 1-2s though pt consistently triggered ~2 swallows per bolus. No changes to vocal quality appreciated and no overt s/sx of aspiration occurred. Limited PO trials given due to impaired ELBERT.    Clinical Impressions  Patient presents with clinical s/sx of at least oral dysphagia. Given he has significant risk factors for pharyngeal dysphagia (L MCA CVA, dysarthria), recommend keeping pt NPO except ice chips, imperative pills crushed in applesauce pending a reassessment and possible FEES when more alert. SLP will plan to reassess tomorrow.     Recommendations  Diet Consistency: NPO except ice chips, imperative  "meds crushed in applesauce pending reassessment as ELBERT improves  Instrumentation: FEES (when more alert)  Medication: Crush with applesauce, as appropriate (if unable to give via non oral route)  Supervision: 1:1 feeding with constant supervision  Positioning: Fully upright and midline during oral intake  Oral Care: Q4h    SLP Treatment Plan  Treatment Plan: Dysphagia Treatment, Patient/Family/Caregiver Training  SLP Frequency: 5x Per Week  Estimated Duration: Until Therapy Goals Met    Anticipated Discharge Needs  Discharge Recommendations: Recommend post-acute placement for additional speech therapy services prior to discharge home   Therapy Recommendations Upon DC: Patient / Family / Caregiver Education, Dysphagia Training, Expression Training      Patient / Family Goals  Patient / Family Goal #1: \"I care. I care.\" - patient  Short Term Goals  Short Term Goal # 1: Patient will consume PO trials with SLP only with no overt s/sx of aspiration.  Short Term Goal # 2: Patient will participate in an instrumental swallow study to determine swallow physiology and inform POC.      Araceli Durant MS,CCC-SLP   "

## 2023-07-14 NOTE — OR SURGEON
Immediate Post- Operative Note        Findings: Left ICA stenosis and thrombus with left MCA occlusion. TICI 2C recanalization      Procedure(s): Cerebral Angiogram and mechanical thrombectomy      Estimated Blood Loss: Less than 5 ml        Complications: None            7/14/2023     12:42 AM     Earnestine Fox M.D.

## 2023-07-14 NOTE — DISCHARGE PLANNING
Case Management Discharge Planning    Admission Date: 7/13/2023  GMLOS:    ALOS: 0    6-Clicks ADL Score:    6-Clicks Mobility Score:    PT/OT/SLP evaluations pending    Anticipated Discharge Dispo: Discharge Disposition: Disch to IP rehab facility or distinct part unit (62)  Per chart review pt resides in [place], [place].  Family support:  Vanessa Hayward Significant other [phone]     Unknown,Shylo (partner's daughter) Other   [phone]       DME Needed: pending hospital course    Action(s) Taken: chart reviewed    Escalations Completed: None    Medically Clear: No    Next Steps: f/u with pt and medical team to discuss dc needs and barriers.  Assessment to be completed to assess for HCM needs.  RNCM to acquire: Ins information, SS, demographics    Barriers to Discharge: Medical clearance/Specialty Clearance    1000: RNCM PC to pt S.O., Vanessa, to acquire assessment information. Pt physical address is [address] [place], [place] [id number] [place]. [address] ([id number] for mail). Vanessa stated pt lives alone in a first floor Senior Apartment there in [place]. PT does not use any assistive DME nor oxygen at baseline.  Vanessa provide pt's Legal name: Brenton Harvey, SS# and Insurance information; PFA notified/updated. Delfina stated her and her daughter live in [place] and provide transportation for pt. Vanessa is in route to [facility] to come bedside.    Care Transition Team Assessment    Information Source  Orientation Level: Oriented to place, Oriented to situation, Oriented to person, Disoriented to time  Information Given By: Significant Other  Who is responsible for making decisions for patient? : Patient    Readmission Evaluation  Is this a readmission?: No    Elopement Risk  Legal Hold: No  Ambulatory or Self Mobile in Wheelchair: No-Not an Elopement Risk  Elopement Risk: Not at Risk for Elopement    Interdisciplinary Discharge Planning  Does Admitting Nurse Feel This Could be a Complex Discharge?: No  Primary Care  "Physician: Nereida Acosta  Lives with - Patient's Self Care Capacity: Alone and Able to Care For Self  Patient or legal guardian wants to designate a caregiver: No  Support Systems: Spouse / Significant Other, Friends / Neighbors  Housing / Facility: 1 Story Apartment / Condo  Able to Return to Previous ADL's: Future Time w/Therapy  Prior Services: None  Durable Medical Equipment: Not Applicable    Discharge Preparedness  What is your plan after discharge?: Uncertain - pending medical team collaboration  What are your discharge supports?: Spouse, Child (Step daughter)  Prior Functional Level: Independent with Activities of Daily Living, Independent with Medication Management  Difficulity with IADLs: Driving (\"DL has lapsed\")    Functional Assesment  Prior Functional Level: Independent with Activities of Daily Living, Independent with Medication Management    Finances  Financial Barriers to Discharge: No  Prescription Coverage: Yes    Vision / Hearing Impairment  Vision Impairment : Yes  Right Eye Vision: Impaired, Wears Glasses  Left Eye Vision: Impaired, Wears Glasses  Hearing Impairment : No    Psychological Assessment  History of Substance Abuse: None  History of Psychiatric Problems: No    Discharge Risks or Barriers  Discharge risks or barriers?: Complex medical needs, Other (comment) (lives rurally)  Patient risk factors: Complex medical needs, Vulnerable adult, Other (comment) (lives rurally)    Anticipated Discharge Information  Discharge Disposition: Disch to IP rehab facility or distinct part unit (62)                "

## 2023-07-14 NOTE — PROGRESS NOTES
"Critical Care Progress Note    Date of admission  7/13/2023    Chief Complaint  \"Ignacio Harvey is a 77 y.o. male who was brought to [facility] from his home in [place], [place] on 7/13/2023 after the patient had a GLF at 2100 and was found to have right sided weakness with a right facial droop and dysarthria.  Upon arrival to [facility] ER, the patient was sent for emergent CT head that was negative for acute bleeding, but did show a hyperdense region to the left MCA distribution consistent with left MCA occlusion.  The patient was seen by Neurology and advised TNKase that was given at 2330.  Neurology and IR evaluated imaging and deemed the patient to be a good candidate for thrombectomy.  The patient was taken to IR for emergent intervention and then will be admitted to the ICU for ongoing care.\" -Dr. Kirkpatrick note       Hospital Course  No notes on file    Interval Problem Update  Reviewed last 24 hour events:  Remains plegic on R  Goal -160, levophed for BP goal      Review of Systems  Review of Systems   Unable to perform ROS: Other   Expressive aphasia    Vital Signs for last 24 hours   Temp:  [36.1 °C (97 °F)-36.8 °C (98.2 °F)] 36.8 °C (98.2 °F)  Pulse:  [] 73  Resp:  [9-50] 18  BP: (111-189)/() 111/71  SpO2:  [94 %-100 %] 97 %    Hemodynamic parameters for last 24 hours       Respiratory Information for the last 24 hours       Physical Exam   Physical Exam  Constitutional:       General: He is not in acute distress.     Appearance: He is ill-appearing.      Comments: Lying in bed sleeping, follows some commands but mostly very sleepy   HENT:      Mouth/Throat:      Mouth: Mucous membranes are dry.   Eyes:      Comments: Small pupil, L gaze preference   Cardiovascular:      Rate and Rhythm: Normal rate and regular rhythm.   Pulmonary:      Effort: Pulmonary effort is normal.      Breath sounds: Normal breath sounds.   Abdominal:      General: Bowel sounds are normal.      Palpations: Abdomen is soft.      " "Tenderness: There is no abdominal tenderness.   Musculoskeletal:      Right lower leg: No edema.      Left lower leg: No edema.   Skin:     General: Skin is warm and dry.   Neurological:      Motor: Weakness present.      Comments: 0/5 RUE  2/5 RLE  5/5 LUE and LLE  L gaze preference, but able to cross midline  R hemineglect  R tongue deviation  Very sleepy and follows commands but not all, lethargic  Able to name \"glasses\", wouldn't vs couldn't do repition   Psychiatric:      Comments: Unable to assess d/t lethargy         Medications  Current Facility-Administered Medications   Medication Dose Route Frequency Provider Last Rate Last Admin    senna-docusate (Pericolace Or Senokot S) 8.6-50 MG per tablet 2 Tablet  2 Tablet Oral BID Rosemary Kirkpatrick M.D.        And    polyethylene glycol/lytes (Miralax) PACKET 1 Packet  1 Packet Oral QDAY PRN Rosemary Kirkpatrick M.D.        And    magnesium hydroxide (Milk Of Magnesia) suspension 30 mL  30 mL Oral QDAY PRN Rosemary Kirkpatrick M.D.        And    bisacodyl (Dulcolax) suppository 10 mg  10 mg Rectal QDAY PRN Rosemary Kirkpatrick M.D.        ondansetron (Zofran) syringe/vial injection 4 mg  4 mg Intravenous Q4HRS PRN Rosemary Kirkpatrick M.D.        ondansetron (Zofran ODT) dispertab 4 mg  4 mg Oral Q4HRS PRN Rosemary Kirkpatrick M.D.        atorvastatin (Lipitor) tablet 40 mg  40 mg Oral Q EVENING Rosemary Kirkpatrick M.D.        niCARdipine (Cardene) 25 mg in  mL Standard Infusion  0-15 mg/hr Intravenous Continuous Rosemary Kirkpatrick M.D.   Held at 07/14/23 0115    hydrALAZINE (Apresoline) injection 20 mg  20 mg Intravenous Q2HRS PRN Rosemary Kirkpatrick M.D.        labetalol (Normodyne/Trandate) injection 10-20 mg  10-20 mg Intravenous Q10 MIN PRN Rosemary Kirkpatrick M.D.           Fluids    Intake/Output Summary (Last 24 hours) at 7/14/2023 0747  Last data filed at 7/14/2023 0600  Gross per 24 hour   Intake 0 ml   Output 750 ml   Net -750 ml       Laboratory          Recent Labs    "  07/13/23 2255 07/14/23 0405   SODIUM 136 137   POTASSIUM 4.3 3.5*   CHLORIDE 101 103   CO2 26 23   BUN 25* 20   CREATININE 1.15 0.97   MAGNESIUM  --  2.1   CALCIUM 9.5 9.2     Recent Labs     07/13/23 2255 07/14/23  0405   ALTSGPT 18  --    ASTSGOT 26  --    ALKPHOSPHAT 73  --    TBILIRUBIN 0.4  --    GLUCOSE 117* 115*     Recent Labs     07/13/23 2255   WBC 5.7   NEUTSPOLYS 58.70   LYMPHOCYTES 26.90   MONOCYTES 10.20   EOSINOPHILS 3.00   BASOPHILS 0.70   ASTSGOT 26   ALTSGPT 18   ALKPHOSPHAT 73   TBILIRUBIN 0.4     Recent Labs     07/13/23 2255   RBC 4.70   HEMOGLOBIN 14.6   HEMATOCRIT 41.2*   PLATELETCT 185   PROTHROMBTM 14.2   APTT 28.4   INR 1.11       Imaging  X-Ray:  I have personally reviewed the images and compared with prior images.  CT:    Reviewed    Assessment/Plan  * Acute ischemic left MCA stroke (HCC)- (present on admission)  Assessment & Plan  S/p TNKase at 2330  IR with mechanical thrombectomy with TICI score of 2c  SBP goals > 120 and < 160  Active titration of nicardipine, no Levophed  Neurology following  High intensity statin  Maintain euglycemia, eunatremia, and normothermia  PT/OT/SLP  MRI brain   ECHO     Left carotid artery stenosis- (present on admission)  Assessment & Plan  Suspected to be culprit for thrombus/stroke  S/p TNKase and thrombectomy  SBP goals > 120 and < 160  Will likely need DAPT after follow-up imaging  Neurology following    Hypertensive urgency- (present on admission)  Assessment & Plan  SBP goals > 120 and < 160  Prn hydralazine and labetalol  Active titration of nicardipine for SBP goals         VTE:  Contraindicated  Ulcer: Not Indicated  Lines: None    I have performed a physical exam and reviewed and updated ROS and Plan today (7/14/2023). In review of yesterday's note (7/13/2023), there are no changes except as documented above.     Discussed patient condition and risk of morbidity and/or mortality with RN, RT, Pharmacy, Charge nurse / hot rounds, Patient, and  neurology  The patient remains critically ill status post stroke, thrombolytics and thrombectomy with significant neurologic deficits at very high risk of decompensation and death requiring every 1 hour neurochecks.  Critical care time = 35 minutes in directly providing and coordinating critical care and extensive data review.  No time overlap and excludes procedures.

## 2023-07-14 NOTE — ED TRIAGE NOTES
Chief Complaint   Patient presents with    Possible Stroke     BIB [facility] from his home in [place]. Pt had a GLF at 2100, no trauma noted. LKW 2120, pt was found to have right sided weakness, right facial droop and dysarthria. GCS 15.   No thinners

## 2023-07-14 NOTE — H&P
Critical Care History & Physical Note    Date of Service  7/14/2023    Primary Care Physician  No primary care provider on file.    Consultants  critical care and neurology    Specialist Names: Dr. Tuan Rosario    Code Status  FULL CODE    Chief Complaint  Chief Complaint   Patient presents with    Possible Stroke     BIB [facility] from his home in [place]. Pt had a GLF at 2100, no trauma noted. LKW 2120, pt was found to have right sided weakness, right facial droop and dysarthria. GCS 15.   No thinners        History of Presenting Illness  Ignacio Harvey is a 77 y.o. male who was brought to [facility] from his home in [place], [place] on 7/13/2023 after the patient had a GLF at 2100 and was found to have right sided weakness with a right facial droop and dysarthria.  Upon arrival to [facility] ER, the patient was sent for emergent CT head that was negative for acute bleeding, but did show a hyperdense region to the left MCA distribution consistent with left MCA occlusion.  The patient was seen by Neurology and advised TNKase that was given at 2330.  Neurology and IR evaluated imaging and deemed the patient to be a good candidate for thrombectomy.  The patient was taken to IR for emergent intervention and then will be admitted to the ICU for ongoing care.    I discussed the plan of care with patient, bedside RN, charge RN, pharmacy, neurology, and neuro IR .    Review of Systems  Review of Systems   Constitutional:  Negative for chills, fever and malaise/fatigue.   HENT:  Negative for congestion and sore throat.         Swelling to left jaw   Eyes:  Negative for blurred vision and double vision.   Respiratory:  Negative for cough and shortness of breath.    Cardiovascular:  Negative for chest pain and leg swelling.   Gastrointestinal:  Negative for abdominal pain, nausea and vomiting.   Genitourinary:  Negative for flank pain and hematuria.   Musculoskeletal:  Negative for back pain and neck pain.   Skin:  Negative for  rash.   Neurological:  Negative for dizziness, weakness and headaches.   Endo/Heme/Allergies:  Does not bruise/bleed easily.   Psychiatric/Behavioral:  Negative for depression. The patient is not nervous/anxious.        Past Medical History  No history of HTN, DM, cancer    Surgical History  No surgeries in the past    Family History  No strokes in the family    Social History   No alcohol use.  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    Allergies  No Known Allergies    Medications  None       Physical Exam  Pulse:  [] 76  Resp:  [14-50] 17  BP: (135-175)/() 168/100  SpO2:  [96 %-97 %] 97 %  Blood Pressure : (!) 168/100       Pulse: 76   Respiration: 17   Pulse Oximetry: 97 %       Physical Exam  Vitals and nursing note reviewed.   Constitutional:       General: He is not in acute distress.     Appearance: He is normal weight. He is ill-appearing. He is not toxic-appearing.   HENT:      Head: Normocephalic and atraumatic.      Right Ear: External ear normal.      Left Ear: External ear normal.      Nose: Nose normal. No rhinorrhea.      Mouth/Throat:      Mouth: Mucous membranes are moist.      Pharynx: Oropharynx is clear. No oropharyngeal exudate.      Comments: Poor dentition noted, chronic swelling noted to left side of jaw  Eyes:      General: No scleral icterus.     Conjunctiva/sclera: Conjunctivae normal.      Pupils: Pupils are equal, round, and reactive to light.   Cardiovascular:      Rate and Rhythm: Normal rate and regular rhythm.      Pulses: Normal pulses.           Radial pulses are 2+ on the right side and 2+ on the left side.        Dorsalis pedis pulses are 2+ on the right side and 2+ on the left side.      Heart sounds: Normal heart sounds. No murmur heard.  Pulmonary:      Breath sounds: Normal breath sounds. No wheezing.   Chest:      Chest wall: No tenderness.   Abdominal:      Palpations:  Abdomen is soft.      Tenderness: There is no abdominal tenderness. There is no guarding or rebound.   Musculoskeletal:         General: Normal range of motion.      Cervical back: Normal range of motion and neck supple.      Right lower leg: No edema.      Left lower leg: No edema.   Lymphadenopathy:      Cervical: No cervical adenopathy.   Skin:     General: Skin is warm and dry.      Capillary Refill: Capillary refill takes less than 2 seconds.      Findings: No rash.   Neurological:      Mental Status: He is alert and oriented to person, place, and time.      Cranial Nerves: Cranial nerve deficit present.      Sensory: No sensory deficit.      Motor: Weakness present.      Comments: Right facial droop noted, RUE/RLE weakness noted at 4/5   Psychiatric:         Mood and Affect: Mood normal.         Behavior: Behavior normal.         Thought Content: Thought content normal.         Laboratory:  Recent Labs     07/13/23  2255   WBC 5.7   RBC 4.70   HEMOGLOBIN 14.6   HEMATOCRIT 41.2*   MCV 87.7   MCH 31.1   MCHC 35.4   RDW 40.6   PLATELETCT 185   MPV 9.4     Recent Labs     07/13/23  2255   SODIUM 136   POTASSIUM 4.3   CHLORIDE 101   CO2 26   GLUCOSE 117*   BUN 25*   CREATININE 1.15   CALCIUM 9.5     Recent Labs     07/13/23  2255   ALTSGPT 18   ASTSGOT 26   ALKPHOSPHAT 73   TBILIRUBIN 0.4   GLUCOSE 117*     Recent Labs     07/13/23  2255   APTT 28.4   INR 1.11     No results for input(s): NTPROBNP in the last 72 hours.      Recent Labs     07/13/23  2255   TROPONINT 13       Imaging:  DX-CHEST-PORTABLE (1 VIEW)   Final Result      No acute cardiopulmonary abnormality.         CT-CTA NECK WITH & W/O-POST PROCESSING   Final Result      1.  Sluggish flow in the distal left internal carotid artery, with occlusion of the carotid terminus.   2.  No high-grade stenosis or occlusion of the proximal left internal carotid artery.   3.  Patent right carotid and vertebral arteries, without occlusion, aneurysm or high-grade  stenosis.      CT-CTA HEAD WITH & W/O-POST PROCESS   Final Result      Acute occlusion the distal left internal carotid artery and left M1 segment.      CT-CEREBRAL PERFUSION ANALYSIS   Final Result      1.  Cerebral blood flow less than 30% likely representing completed infarct = 14 mL.      2.  T Max more than 6 seconds likely representing combination of completed infarct and ischemia = 194 mL.      3.  Mismatched volume likely representing ischemic brain/penumbra = 180 mL.      4.  Please note that the cerebral perfusion was performed on the limited brain tissue around the basal ganglia region. Infarct/ischemia outside the CT perfusion sections can be missed in this study.      CT-HEAD W/O   Final Result   Addendum (preliminary) 1 of 1   Findings were communicated to Dr. Fox via [employer] system on    7/13/2023 11:17 PM.      Final      1.  Hyperdense left MCA, consistent with left MCA occlusion.   2.  No loss of gray-white matter differentiation at this time.   3.  Mild atrophy and chronic small vessel ischemic changes.         IR-THROMBO MECHANICAL ARTERY,INIT    (Results Pending)       Assessment/Plan:  Justification for Admission Status  I anticipate this patient will require at least two midnights for appropriate medical management, necessitating inpatient admission because of an acute left MCA stroke s/p TNKase and mechanical thrombectomy requiring strict SBP control and evaluation by PT, OT, and SLP.    Patient will need a  bed on EMERGENCY service .  The need is secondary to acute left MCA stroke s/p TNKase and mechanical thrombectomy requiring hourly neurological checks and strict SBP control with infusion of nicardipine.    * Acute ischemic left MCA stroke (HCC)- (present on admission)  Assessment & Plan  S/p TNKase at 2330  IR with mechanical thrombectomy with TICI score of 2c  SBP goals > 120 and < 160  Active titration of nicardipine for SBP goals  Neurology following  High intensity  statin  Maintain euglycemia, eunatremia, and normothermia  PT/OT/SLP  MRI brain later today  ECHO     Left carotid artery stenosis- (present on admission)  Assessment & Plan  Suspected to be culprit for thrombus/stroke  S/p TNKase and thrombectomy  SBP goals > 120 and < 160  Will likely need DAPT  Neurology following    Hypertensive urgency- (present on admission)  Assessment & Plan  SBP goals > 120 and < 160  Prn hydralazine and labetalol  Active titration of nicardipine for SBP goals        VTE prophylaxis: SCDs/TEDs and pharmacologic prophylaxis contraindicated due to TNKase administration    The patient is critically ill at this time requiring hourly neurological checks after TNKase administration and mechanical thrombectomy as well as active titration of nicardipine infusion for SBP goals.  I have assessed and reassessed the patient's hemodynamics, neurological status, and respiratory status.  The patient remains at high risk of clinical deterioration, worsening vital organ dysfunction, and death without the above critical care interventions.    Critical care time = 106 minutes.

## 2023-07-14 NOTE — THERAPY
Speech Language Therapy Contact Note    Patient Name: Ignacio Harvey  Age:  77 y.o., Sex:  male  Medical Record #: [id number]  Today's Date: 7/14/2023    Orders received for a clinical swallow and cognitive-linguistic evaluation. Per discussion w/ RN, pt is not able to sustain arousal for a swallow eval at this time. RN to contact SLP when pt is better able to participate.        07/14/23 0808   Treatment Variance   Reason For Missed Therapy Medical - Patient not Able To Participate

## 2023-07-14 NOTE — ASSESSMENT & PLAN NOTE
S/p TNKase and thrombectomy  L ICA and L MCA thrombectomy. TICI 2C flow  SBP goals > 120 and < 160  Levophed and midodrine for SBP >120, running levophed peripherally given low doses  Will likely need DAPT, single anti-PLT for now per neuro  Neurology following  High intensity statin  Maintain euglycemia, eunatremia, and normothermia  PT/OT/SLP  Echo no significant abnormality   MRI demonstrates significant L MCA territory stroke  Seems like he may be R dominant as his speech is largely preserved

## 2023-07-14 NOTE — ED PROVIDER NOTES
"ED Provider Note    CHIEF COMPLAINT  Chief Complaint   Patient presents with    Possible Stroke     BIB [facility] from his home in [place]. Pt had a GLF at 2100, no trauma noted. LKW 2120, pt was found to have right sided weakness, right facial droop and dysarthria. GCS 15.   No thinners          HPI/ROS  LIMITATION TO HISTORY   Select: Dysarthria  OUTSIDE HISTORIAN(S):  EMS reports the patient had a ground-level fall at 9:20 PM, this was his last known normal and following this had right-sided weakness, right facial droop, left gaze deviation and dysarthria    Ignacio Harvey is a 77 y.o. male who presents after a ground-level fall without head trauma, followed by right upper extremity weakness, right lower extremity weakness, right facial droop, left gaze deviation, dysarthria.  Family witnessed the patient's fall.  Patient fell onto his backside.  Patient is not on blood thinners.    PAST MEDICAL HISTORY       SURGICAL HISTORY  patient denies any surgical history    FAMILY HISTORY  No family history on file.    SOCIAL HISTORY  Social History     Tobacco Use    Smoking status: Not on file    Smokeless tobacco: Not on file   Substance and Sexual Activity    Alcohol use: Not on file    Drug use: Not on file    Sexual activity: Not on file       CURRENT MEDICATIONS  Home Medications    **Home medications have not yet been reviewed for this encounter**         ALLERGIES  No Known Allergies    PHYSICAL EXAM  VITAL SIGNS: BP (!) 189/86   Pulse 65   Resp 12   Ht 1.905 m (6' 3\")   Wt 90.7 kg (200 lb)   SpO2 100%   BMI 25.00 kg/m²    Vitals and nursing note reviewed.   Constitutional:       Comments: Patient is lying in bed supine, slurred speech  HENT:      Head: Right-sided facial droop, left gaze evaluation  Eyes:      Extraocular Movements: Extraocular movements intact.      Conjunctiva/sclera: Conjunctivae normal.      Pupils: Pupils are equal, round, and reactive to light.   Cardiovascular:      Pulses: " Normal pulses.      Comments: HR 72  Pulmonary:      Effort: Pulmonary effort is normal. No respiratory distress.   Musculoskeletal:         General: No swelling. Normal range of motion.      Cervical back: Normal range of motion. No rigidity.   Skin:     General: Skin is warm and dry.      Capillary Refill: Capillary refill takes less than 2 seconds.   Neurological:      Mental Status: Drowsy, right facial droop, left gaze deviation, drooling, right upper extremity strength 1 out of 5, absent sensation, right lower extremity strength 4-5 with intact sensation, left upper and left lower extremity 5 out of 5 strength and intact sensation.     NIH stroke scale 14    DIAGNOSTIC STUDIES / PROCEDURES  EKG  I have independently interpreted this EKG  No ischemia or arrhythmia    LABS  No leukocytosis or anemia    RADIOLOGY  I have independently interpreted the diagnostic imaging associated with this visit and am waiting the final reading from the radiologist.   My preliminary interpretation is as follows: No intracranial hemorrhage  Radiologist interpretation: Acute occlusion of the distal left internal carotid artery and left M1 segment    COURSE & MEDICAL DECISION MAKING    INITIAL ASSESSMENT, COURSE AND PLAN  Care Narrative: CT brain demonstrates no evidence of intracranial hemorrhage but does demonstrate possible left MCA occlusion with hyperdense radiologic findings.  CTA pending to evaluate for IR intervention.  Patient has no evidence of head trauma at this time or any other trauma.  Patient without chest pain, abdominal pain, radiation to the back.  Acute aortic injury inconsistent with patient presentation at this time. Neurology consulted will assist in decision making thrombolytics and possible IR intervention.      Electronically signed by: Marcio Logan M.D., 7/13/2023 11:21 PM    Patient found to have M1 segment occlusion on the left, neurology at the bedside, tenecteplase has been given, patient will  go to interventional radiology at this time for acute intervention.  Patient mated to the ICU, Dr. Kirkpatrick.  Disposition to the ICU.    This dictation has been created using voice recognition software. I am continuously working with the software to minimize the number of voice recognition errors and I have made every attempt to manually correct the errors within my dictation. However errors  related to this voice recognition software may still exist and should be interpreted within the appropriate context.     Electronically signed by: Marcio Logan M.D., 7/14/2023 12:31 AM      Critical Care    I spent 41 minutes of critical care time with this patient. This does not include time spent on separately reported billable procedures.   This includes pulse ox interpretation, medical record review when available, interpretation of labs and imaging, specialist consultation, and hemodynamic monitoring.      DISPOSITION AND DISCUSSIONS  I have discussed management of the patient with the following physicians and TRENA's:  Dr Rosario, Dr Jenkins      Decision tools and prescription drugs considered including, but not limited to: NIH Stroke Scale 14 and HEART Score 4 .    FINAL DIAGNOSIS  1. Acute ischemic left MCA stroke (HCC)    2. Acute CVA (cerebrovascular accident) (HCC)    3. Weakness    4. Facial droop    5. Dysarthria           Electronically signed by: Marcio Logan M.D., 7/13/2023 11:12 PM

## 2023-07-14 NOTE — PROGRESS NOTES
Brief neurology update note    TICI score 2C.    Patient still has significant distal ICA stenosis.  Given this stenosis will modify the blood pressure parameters.  Keep the blood pressure between 120s to 160 systolic.  Plan on DAPT therapy after repeat imaging if no major hemorrhage.

## 2023-07-14 NOTE — DISCHARGE PLANNING
[facility]    Referral from:  Dr. Rosemary Kirkpatrick  Insurance Provider on Facesheet:  Medicare  Potential Rehab diagnosis:  Stroke    Chart review indicates patient has ongoing medical management and therapy needs to possibly meet inpatient rehab facility criteria with the goal of returning to community.      D/C Support: Significant Other    Physiatry to consult per protocol.  Stroke [NIH14] received tPA,  s/p IR mechanical thrombectomy.   PT/OT pending as clinically appropriate.      Last Covid test:    Thank you for the referral.

## 2023-07-14 NOTE — THERAPY
Physical Therapy Contact Note    Patient Name: Ignacio Harvey  Age:  77 y.o., Sex:  male  Medical Record #: [id number]  Today's Date: 7/14/2023    Orders received. Pt with strict bedrest orders until 7/15/23. Will evaluate when able.    Sharron Lara, PT, DPT  [phone]

## 2023-07-14 NOTE — PROGRESS NOTES
Intensivist and neurologist notified of acute confusion/disorientation and increased neglect of right arm. Stat head CT ordered.

## 2023-07-14 NOTE — THERAPY
Occupational Therapy Contact Note    Patient Name: Ignacio Harvey  Age:  77 y.o., Sex:  male  Medical Record #: [id number]  Today's Date: 7/14/2023 07/14/23 1502   Interdisciplinary Plan of Care Collaboration   Collaboration Comments Pt on strict bedrest until 7/15. Will initiate OT eval as appropriate once pt off bedrest.

## 2023-07-14 NOTE — PROGRESS NOTES
Patient TOA: 1247    4 Eyes Skin Assessment Completed by Sharron, RN and Gabriela RN.    Head WDL  Ears Redness and Blanching  Nose WDL  Mouth WDL  Neck WDL except soft mass on left neck  Breast/Chest Redness and Blanching  Shoulder Blades WDL  Spine WDL  (R) Arm/Elbow/Hand Redness, Blanching, and Bruising  (L) Arm/Elbow/Hand WDL  Abdomen WDL  Groin Incision CDI, soft  Scrotum/Coccyx/Buttocks WDL  (R) Leg WDL  (L) Leg WDL  (R) Heel/Foot/Toe Redness, Blanching, and Boggy, flaky  (L) Heel/Foot/Toe Redness, Blanching, and Boggy, flaky          Devices In Places ECG, Blood Pressure Cuff, Pulse Ox, and SCD's      Interventions In Place Heel Mepilex, Sacral Mepilex, Pillows, Q2 Turns, Low Air Loss Mattress, and Heels Loaded W/Pillows    Possible Skin Injury No    Pictures Uploaded Into Epic N/A  Wound Consult Placed N/A  RN Wound Prevention Protocol Ordered No

## 2023-07-14 NOTE — CONSULTS
"Neurology STROKE CODE H&P  Neurohospitalist Service, [facility]    Referring Physician: MACARENA Rodriguez.*    STROKE CODE:   Chief Complaint   Patient presents with    Possible Stroke     BIB [facility] from his home in [place]. Pt had a GLF at 2100, no trauma noted. LKW 2120, pt was found to have right sided weakness, right facial droop and dysarthria. GCS 15.   No thinners        To obtain the most accurate data regarding the time called, and time patient seen, refer to the stroke run-sheet and chart.  For time of CT, refer to the radiology report. See A&P below for TPA Decision and door to needle time if and when applicable.    HPI: 77-year-old male brought in due to right-sided weakness arm and leg and face.  Severe dysarthria.  Started at 9:20 PM today.  Witnessed fall by the family.  Patient has no known past medical history.  Patient does not see a doctor regularly.      Review of systems: In addition to what is detailed in the HPI above, (and scanned into the chart if and when applicable), all other systems reviewed and are negative.    Past Medical History:   Unknown  FHx:  No early infarcts  SHx:       Allergies:  No Known Allergies    Medications:  No current facility-administered medications for this encounter.  No current outpatient medications on file.    Physical Examination:    Vitals:    07/13/23 2200 07/13/23 2259 07/13/23 2330   BP:   (!) 175/99   Pulse:   63   Resp:   14   SpO2:   96%   Weight: 90.7 kg (200 lb) 90.7 kg (200 lb)    Height:  1.905 m (6' 3\")        General: Patient is awake and in no acute distress  Eyes: Cannot visualize fundus CV: RRR    NEUROLOGICAL EXAM:     Mental status: Awake, alert and fully oriented, follows commands  Speech and language: Severely dysarthric however the patient can repeat, name objects, follow commands   cranial nerve exam: Pupils equal reactive.  Left gaze preference unable to cross midline.  Right facial weakness.  " Decree sensation to the right.  Acuity appears to be intact.  Absent shoulder shrug on the right.  Tongue midline.    Motor exam: Sustained antigravity in the left arm and leg.  Right lower extremity has antigravity with a drift .minimal movement in the right upper extremity   sensory exam: Decree sensation on the right compared to left with neglect on the right  Deep tendon reflexes: Upgoing toe on the right  Coordination: no ataxia   Gait: deferred due to weakness    NIH Stroke Scale:    1a. Level of Consciousness (Alert, drowsy, etc): 0= Alert    1b. LOC Questions (Month, age): 0= Answers both correctly    1c. LOC Commands (Open/close eyes make fist/let go): 0= Obeys both correctly    2.   Best Gaze (Eyes open - patient follows examiner's finger on face): 2= Forced deviation    3.   Visual Fields (introduce visual stimulus/threat to patient's field quadrants): 0= No visual loss  4.   Facial Paresis (Show teeth, raise eyebrows and squeeze eyes shut): 2 = Partial     5a. Motor Arm - Left (Elevate arm to 90 degrees if patient is sitting, 45 degrees if  supine): 0= No drift    5b. Motor Arm - Right (Elevate arm to 90 degrees if patient is sitting, 45 degrees if supine): 4= No movement    6a. Motor Leg - Left (Elevate leg 30 degrees with patient supine): 0= No drift    6b. Motor Leg - Right  (Elevate leg 30 degrees with patient supine): 1= Drift    7.   Limb Ataxia (Finger-nose, heel down shin): 0= No ataxia    8.   Sensory (Pin prick to face, arm, trunk and leg - compare side to side): 2- Severe loss    9.  Best Language (Name item, describe a picture and read sentences): 0= No aphasia    10. Dysarthria (Evaluate speech clarity by patient repeating listed words): 2= Near to unintelligible or worse    11. Extinction and Inattention (Use information from prior testing to identify neglect or  double simultaneous stimuli testing): 1= Partial neglect    Total NIH Score: 14    Modified Chastity Scale (MRS): 0 = No  symptoms      Objective Data:    Labs:  Lab Results   Component Value Date/Time    PROTHROMBTM 14.2 07/13/2023 10:55 PM    INR 1.11 07/13/2023 10:55 PM      Lab Results   Component Value Date/Time    WBC 5.7 07/13/2023 10:55 PM    RBC 4.70 07/13/2023 10:55 PM    HEMOGLOBIN 14.6 07/13/2023 10:55 PM    HEMATOCRIT 41.2 (L) 07/13/2023 10:55 PM    MCV 87.7 07/13/2023 10:55 PM    MCH 31.1 07/13/2023 10:55 PM    MCHC 35.4 07/13/2023 10:55 PM    MPV 9.4 07/13/2023 10:55 PM    NEUTSPOLYS 58.70 07/13/2023 10:55 PM    LYMPHOCYTES 26.90 07/13/2023 10:55 PM    MONOCYTES 10.20 07/13/2023 10:55 PM    EOSINOPHILS 3.00 07/13/2023 10:55 PM    BASOPHILS 0.70 07/13/2023 10:55 PM      Lab Results   Component Value Date/Time    SODIUM 136 07/13/2023 10:55 PM    POTASSIUM 4.3 07/13/2023 10:55 PM    CHLORIDE 101 07/13/2023 10:55 PM    CO2 26 07/13/2023 10:55 PM    GLUCOSE 117 (H) 07/13/2023 10:55 PM    BUN 25 (H) 07/13/2023 10:55 PM    CREATININE 1.15 07/13/2023 10:55 PM      No results found for: CHOLSTRLTOT, LDL, HDL, TRIGLYCERIDE    Lab Results   Component Value Date/Time    ALKPHOSPHAT 73 07/13/2023 10:55 PM    ASTSGOT 26 07/13/2023 10:55 PM    ALTSGPT 18 07/13/2023 10:55 PM    TBILIRUBIN 0.4 07/13/2023 10:55 PM        Imaging/Testing:  CT-CTA NECK WITH & W/O-POST PROCESSING   Final Result      1.  Sluggish flow in the distal left internal carotid artery, with occlusion of the carotid terminus.   2.  No high-grade stenosis or occlusion of the proximal left internal carotid artery.   3.  Patent right carotid and vertebral arteries, without occlusion, aneurysm or high-grade stenosis.      CT-CTA HEAD WITH & W/O-POST PROCESS   Final Result      Acute occlusion the distal left internal carotid artery and left M1 segment.      CT-CEREBRAL PERFUSION ANALYSIS   Final Result      1.  Cerebral blood flow less than 30% likely representing completed infarct = 14 mL.      2.  T Max more than 6 seconds likely representing combination of completed  infarct and ischemia = 194 mL.      3.  Mismatched volume likely representing ischemic brain/penumbra = 180 mL.      4.  Please note that the cerebral perfusion was performed on the limited brain tissue around the basal ganglia region. Infarct/ischemia outside the CT perfusion sections can be missed in this study.      CT-HEAD W/O   Final Result   Addendum (preliminary) 1 of 1   Findings were communicated to Dr. oFx via Voalte messaging system on    7/13/2023 11:17 PM.      Final      1.  Hyperdense left MCA, consistent with left MCA occlusion.   2.  No loss of gray-white matter differentiation at this time.   3.  Mild atrophy and chronic small vessel ischemic changes.         DX-CHEST-PORTABLE (1 VIEW)    (Results Pending)   IR-THROMBO MECHANICAL ARTERY,INIT    (Results Pending)         Assessment and Plan:    77-year-old male presenting with cute onset of right-sided hemiparesis with neglect and forced gaze palsy.  Patient is found to have a left M1 occlusion along with occlusion of the terminus left ICA.  Status post TNK at 11:30 PM.  Patient will be taken to the IR suite for potential thrombectomy.  Etiology of infarcts unknown.  Patient has unknown medical history does not see physicians regularly.  Patient also potentially could be right hemispheric dominant.  Despite the significant perfusion deficit over the left hemisphere patient does not have obvious signs of aphasia, just the severe dysarthria.  Patient says he is ambidextrous.    Problems  Left MCA territory infarct    Plan:  1.  Admit to ICU after thrombectomy for post thrombolytic protocol  2.  Blood pressure parameters will be dictated on the TICI score as below.  In the meantime keep below 180 systolic  3.  No antiplatelets or anticoagulant for next 24 hours.  4.  Telemetry monitoring  5.  MRI brain, no need to wait for 24-hour pepe.  Can get first thing in the morning.  6.  Check a lipid panel, start statin for an LDL below 70  7.  Check an  A1c maintain normoglycemia  8.  TTE without bubble  9.  Okay to start PT and OT and speech tomorrow  10.  SCDs for DVT prophylactics      After the endovascular intervention, please follow the following recommendations regarding blood pressure parameters:    If TICI 3/2C: maintain systolic BP < 140  If TICI 2b: maintain systolic BP < 160  If TICI 2a or less, maintain systolic BP < 180 per tPA protocol    This is in an effort to minimize reperfusion injury and/or hemorrhagic conversion              The evaluation of the patient, and recommended management, was discussed with the resident staff.     This chart was partially generated using voice recognition technology and sound alike word replacement may be present, best efforts were made to make the chart accurate.    Romel Rosario MD  Board Certified Neurology, ABPN  (t) [phone]

## 2023-07-14 NOTE — PROGRESS NOTES
Cerebral angiogram with mechanical thrombectomy by Dr. Fox.  Emergent consent. Pre-procedure pedal pulses +2. Right Femoral Artery access site. Pt placed on monitor, prepped and draped in a sterile fashion. Vital signs were taken every 5 minutes and remained within parameters (see doc flow sheets). Penumbra system was used to retrieve clot. Hemostasis achieved using Perclose deployed at 0032. Report given to Sharron ALMENDAREZ. Pt was transported to ICU with IR RN x2 and monitor to R107. Stroke worksheet review during warm handoff with Sharron ALMENDAREZ. ICU responsible for serial checks starting at 0047. See Stroke Procedure flowsheet for VS, neuro, access, extremity serial assessments.    LKW:2120  NIH:14  TNK : 2331  Time in IR:2358  Access: 0011  1st angio: 0018  1st pass:0023  Closure (end time):0032      TICI score 2c  @ 0027    Post procedure pedal pulses 2+    Perclose   REF: [id number]  LOT: [id number]  EXP: 3/31/2025

## 2023-07-15 ENCOUNTER — APPOINTMENT (OUTPATIENT)
Dept: RADIOLOGY | Facility: MEDICAL CENTER | Age: 78
DRG: 023 | End: 2023-07-15
Attending: INTERNAL MEDICINE
Payer: MEDICARE

## 2023-07-15 PROBLEM — T74.91XA VICTIM OF ELDER ABUSE: Status: ACTIVE | Noted: 2023-07-15

## 2023-07-15 LAB
ANION GAP SERPL CALC-SCNC: 9 MMOL/L (ref 7–16)
BASOPHILS # BLD AUTO: 0.3 % (ref 0–1.8)
BASOPHILS # BLD: 0.02 K/UL (ref 0–0.12)
BUN SERPL-MCNC: 19 MG/DL (ref 8–22)
CALCIUM SERPL-MCNC: 9.1 MG/DL (ref 8.5–10.5)
CHLORIDE SERPL-SCNC: 101 MMOL/L (ref 96–112)
CO2 SERPL-SCNC: 23 MMOL/L (ref 20–33)
CREAT SERPL-MCNC: 0.78 MG/DL (ref 0.5–1.4)
EOSINOPHIL # BLD AUTO: 0.03 K/UL (ref 0–0.51)
EOSINOPHIL NFR BLD: 0.4 % (ref 0–6.9)
ERYTHROCYTE [DISTWIDTH] IN BLOOD BY AUTOMATED COUNT: 37.5 FL (ref 35.9–50)
EST. AVERAGE GLUCOSE BLD GHB EST-MCNC: 100 MG/DL
GFR SERPLBLD CREATININE-BSD FMLA CKD-EPI: 93 ML/MIN/1.73 M 2
GLUCOSE SERPL-MCNC: 113 MG/DL (ref 65–99)
HBA1C MFR BLD: 5.1 % (ref 4–5.6)
HCT VFR BLD AUTO: 42.1 % (ref 42–52)
HGB BLD-MCNC: 15 G/DL (ref 14–18)
IMM GRANULOCYTES # BLD AUTO: 0.03 K/UL (ref 0–0.11)
IMM GRANULOCYTES NFR BLD AUTO: 0.4 % (ref 0–0.9)
LYMPHOCYTES # BLD AUTO: 0.96 K/UL (ref 1–4.8)
LYMPHOCYTES NFR BLD: 12.8 % (ref 22–41)
MCH RBC QN AUTO: 30.2 PG (ref 27–33)
MCHC RBC AUTO-ENTMCNC: 35.6 G/DL (ref 32.3–36.5)
MCV RBC AUTO: 84.7 FL (ref 81.4–97.8)
MONOCYTES # BLD AUTO: 0.77 K/UL (ref 0–0.85)
MONOCYTES NFR BLD AUTO: 10.3 % (ref 0–13.4)
NEUTROPHILS # BLD AUTO: 5.7 K/UL (ref 1.82–7.42)
NEUTROPHILS NFR BLD: 75.8 % (ref 44–72)
NRBC # BLD AUTO: 0 K/UL
NRBC BLD-RTO: 0 /100 WBC (ref 0–0.2)
PLATELET # BLD AUTO: 192 K/UL (ref 164–446)
PMV BLD AUTO: 9.6 FL (ref 9–12.9)
POTASSIUM SERPL-SCNC: 3.5 MMOL/L (ref 3.6–5.5)
RBC # BLD AUTO: 4.97 M/UL (ref 4.7–6.1)
SODIUM SERPL-SCNC: 133 MMOL/L (ref 135–145)
WBC # BLD AUTO: 7.5 K/UL (ref 4.8–10.8)

## 2023-07-15 PROCEDURE — 80048 BASIC METABOLIC PNL TOTAL CA: CPT

## 2023-07-15 PROCEDURE — 700111 HCHG RX REV CODE 636 W/ 250 OVERRIDE (IP): Performed by: INTERNAL MEDICINE

## 2023-07-15 PROCEDURE — 92612 ENDOSCOPY SWALLOW (FEES) VID: CPT

## 2023-07-15 PROCEDURE — 83036 HEMOGLOBIN GLYCOSYLATED A1C: CPT

## 2023-07-15 PROCEDURE — 99223 1ST HOSP IP/OBS HIGH 75: CPT | Performed by: PHYSICAL MEDICINE & REHABILITATION

## 2023-07-15 PROCEDURE — 99233 SBSQ HOSP IP/OBS HIGH 50: CPT | Performed by: PSYCHIATRY & NEUROLOGY

## 2023-07-15 PROCEDURE — 99291 CRITICAL CARE FIRST HOUR: CPT | Performed by: INTERNAL MEDICINE

## 2023-07-15 PROCEDURE — A9270 NON-COVERED ITEM OR SERVICE: HCPCS | Performed by: PSYCHIATRY & NEUROLOGY

## 2023-07-15 PROCEDURE — 92526 ORAL FUNCTION THERAPY: CPT

## 2023-07-15 PROCEDURE — 85025 COMPLETE CBC W/AUTO DIFF WBC: CPT

## 2023-07-15 PROCEDURE — 700102 HCHG RX REV CODE 250 W/ 637 OVERRIDE(OP): Performed by: INTERNAL MEDICINE

## 2023-07-15 PROCEDURE — A9270 NON-COVERED ITEM OR SERVICE: HCPCS | Performed by: INTERNAL MEDICINE

## 2023-07-15 PROCEDURE — 700102 HCHG RX REV CODE 250 W/ 637 OVERRIDE(OP): Performed by: PSYCHIATRY & NEUROLOGY

## 2023-07-15 PROCEDURE — 700111 HCHG RX REV CODE 636 W/ 250 OVERRIDE (IP): Performed by: PSYCHIATRY & NEUROLOGY

## 2023-07-15 PROCEDURE — 770022 HCHG ROOM/CARE - ICU (200)

## 2023-07-15 RX ORDER — HYDROMORPHONE HYDROCHLORIDE 1 MG/ML
0.25 INJECTION, SOLUTION INTRAMUSCULAR; INTRAVENOUS; SUBCUTANEOUS EVERY 4 HOURS PRN
Status: DISCONTINUED | OUTPATIENT
Start: 2023-07-15 | End: 2023-07-27 | Stop reason: HOSPADM

## 2023-07-15 RX ORDER — ASPIRIN 300 MG/1
300 SUPPOSITORY RECTAL DAILY
Status: DISCONTINUED | OUTPATIENT
Start: 2023-07-15 | End: 2023-07-27 | Stop reason: HOSPADM

## 2023-07-15 RX ORDER — AMOXICILLIN 250 MG
2 CAPSULE ORAL 2 TIMES DAILY
Status: DISCONTINUED | OUTPATIENT
Start: 2023-07-15 | End: 2023-07-19

## 2023-07-15 RX ORDER — POTASSIUM CHLORIDE 7.45 MG/ML
10 INJECTION INTRAVENOUS
Status: COMPLETED | OUTPATIENT
Start: 2023-07-15 | End: 2023-07-15

## 2023-07-15 RX ORDER — BISACODYL 10 MG
10 SUPPOSITORY, RECTAL RECTAL
Status: DISCONTINUED | OUTPATIENT
Start: 2023-07-15 | End: 2023-07-19

## 2023-07-15 RX ORDER — ATORVASTATIN CALCIUM 40 MG/1
40 TABLET, FILM COATED ORAL EVERY EVENING
Status: DISCONTINUED | OUTPATIENT
Start: 2023-07-15 | End: 2023-07-17

## 2023-07-15 RX ORDER — POLYETHYLENE GLYCOL 3350 17 G/17G
1 POWDER, FOR SOLUTION ORAL
Status: DISCONTINUED | OUTPATIENT
Start: 2023-07-15 | End: 2023-07-19

## 2023-07-15 RX ORDER — ONDANSETRON 4 MG/1
4 TABLET, ORALLY DISINTEGRATING ORAL EVERY 4 HOURS PRN
Status: DISCONTINUED | OUTPATIENT
Start: 2023-07-15 | End: 2023-07-27 | Stop reason: HOSPADM

## 2023-07-15 RX ORDER — ASPIRIN 81 MG/1
81 TABLET, CHEWABLE ORAL DAILY
Status: DISCONTINUED | OUTPATIENT
Start: 2023-07-15 | End: 2023-07-27 | Stop reason: HOSPADM

## 2023-07-15 RX ADMIN — POTASSIUM CHLORIDE 10 MEQ: 7.46 INJECTION, SOLUTION INTRAVENOUS at 12:08

## 2023-07-15 RX ADMIN — ENOXAPARIN SODIUM 40 MG: 100 INJECTION SUBCUTANEOUS at 18:00

## 2023-07-15 RX ADMIN — ASPIRIN 81 MG 81 MG: 81 TABLET ORAL at 20:29

## 2023-07-15 RX ADMIN — HYDROMORPHONE HYDROCHLORIDE 0.25 MG: 1 INJECTION, SOLUTION INTRAMUSCULAR; INTRAVENOUS; SUBCUTANEOUS at 09:10

## 2023-07-15 RX ADMIN — POTASSIUM CHLORIDE 10 MEQ: 7.46 INJECTION, SOLUTION INTRAVENOUS at 11:01

## 2023-07-15 ASSESSMENT — COGNITIVE AND FUNCTIONAL STATUS - GENERAL
MOBILITY SCORE: 6
DAILY ACTIVITIY SCORE: 6
SUGGESTED CMS G CODE MODIFIER DAILY ACTIVITY: CN
PERSONAL GROOMING: TOTAL
TOILETING: TOTAL
CLIMB 3 TO 5 STEPS WITH RAILING: TOTAL
STANDING UP FROM CHAIR USING ARMS: TOTAL
DRESSING REGULAR UPPER BODY CLOTHING: TOTAL
HELP NEEDED FOR BATHING: TOTAL
TURNING FROM BACK TO SIDE WHILE IN FLAT BAD: UNABLE
MOVING TO AND FROM BED TO CHAIR: UNABLE
EATING MEALS: TOTAL
SUGGESTED CMS G CODE MODIFIER MOBILITY: CN
DRESSING REGULAR LOWER BODY CLOTHING: TOTAL
WALKING IN HOSPITAL ROOM: TOTAL
MOVING FROM LYING ON BACK TO SITTING ON SIDE OF FLAT BED: UNABLE

## 2023-07-15 ASSESSMENT — PAIN DESCRIPTION - PAIN TYPE
TYPE: ACUTE PAIN

## 2023-07-15 ASSESSMENT — FIBROSIS 4 INDEX: FIB4 SCORE: 2.39

## 2023-07-15 NOTE — PROGRESS NOTES
Bedside rounds completed with Sidney RN, pt now complaining of new facial numbness. Pt due for MRI before 2300 tonight, earliest they can get pt in is 2100. Mariah HERNANDEZ made aware, awaiting to see whether MD wants pt to go for a CT now or MRI around 2100.
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
## 2023-07-15 NOTE — PROGRESS NOTES
Neurology Progress Note  Neurohospitalist Service, [facility]    Referring Physician: Rosemary Kirkpatrick M.D.    Chief Complaint   Patient presents with    Possible Stroke     BIB [facility] from his home in [place]. Pt had a GLF at 2100, no trauma noted. LKW 2120, pt was found to have right sided weakness, right facial droop and dysarthria. GCS 15.   No thinners        HPI: Refer to initial documented Neurology H&P, as detailed in the patient's chart.    Interval History 7/15: Patient still nearly plegic on the right side however somewhat inconsistent at times per nurse.  Will lethargic today compared to yesterday.    Review of systems: In addition to what is detailed in the HPI and/or updated in the interval history, all other Medications:    Current Facility-Administered Medications:     senna-docusate (Pericolace Or Senokot S) 8.6-50 MG per tablet 2 Tablet, 2 Tablet, Oral, BID **AND** polyethylene glycol/lytes (Miralax) PACKET 1 Packet, 1 Packet, Oral, QDAY PRN **AND** magnesium hydroxide (Milk Of Magnesia) suspension 30 mL, 30 mL, Oral, QDAY PRN **AND** bisacodyl (Dulcolax) suppository 10 mg, 10 mg, Rectal, QDAY PRN, Rosemary Kirkpatrick M.D.    ondansetron (Zofran) syringe/vial injection 4 mg, 4 mg, Intravenous, Q4HRS PRN, Rosemary Kirkpatrick M.D.    ondansetron (Zofran ODT) dispertab 4 mg, 4 mg, Oral, Q4HRS PRN, Rosemary Kirkpatrick M.D.    atorvastatin (Lipitor) tablet 40 mg, 40 mg, Oral, Q EVENING, Rosemary Kirkpatrick M.D.    hydrALAZINE (Apresoline) injection 20 mg, 20 mg, Intravenous, Q2HRS PRN, Rosemary Kirkpatrick M.D.    labetalol (Normodyne/Trandate) injection 10-20 mg, 10-20 mg, Intravenous, Q10 MIN PRN, Rosemary Kirkpatrick M.D.    [Held by provider] norepinephrine (Levophed) 8 mg in 250 mL NS infusion (premix), 0-1 mcg/kg/min (Ideal), Intravenous, Continuous, RAFAEL LukeP.R.NKaris, Last Rate: 5 mL/hr at 07/15/23 0615, 0.03 mcg/kg/min at 07/15/23 0615    enoxaparin (Lovenox) inj 40  mg, 40 mg, Subcutaneous, DAILY AT 1800, Romel Rosario M.D., 40 mg at 07/14/23 2353    aspirin (Asa) suppository 300 mg, 300 mg, Rectal, DAILY, Romel Rosario M.D., 300 mg at 07/14/23 2353    Physical Examination:     Vitals:    07/15/23 0545 07/15/23 0600 07/15/23 0615 07/15/23 0630   BP: 117/75 (!) 140/76 (!) 166/100 120/76   Pulse: 63 65 69 61   Resp: 16 17 14 17   Temp:  36.8 °C (98.2 °F)     TempSrc:  Temporal     SpO2: 95% 96%     Weight:       Height:             NEUROLOGICAL EXAM:     Patient will awaken to verbal stimuli.  Wants to go home he says.  He will follow commands.  Stable telling his name.  Able to name object.  Severely dysarthric.  He tries to repeat but I am able to understand him.  Able to tell me he is in the hospital.  Cranial nerves II to XII shows a right facial droop.  He has a left gaze preference that he is able to overcome past midline with encouragement.  Severe dysarthria.  Pupils are equal reactive.  Motor examination he has sustained antigravity in the left arm and leg.  The right arm is plegic though he has moments of-antigravity strength briefly at times.  The right lower extremity he is briefly antigravity  Sensation is decreased on the right compared to left.  He does have neglect to the left side.  Toes upgoing on both sides.  Cannot assess gait due to weakness    Overall unchanged exam compared to yesterday.  Though slightly more lethargic              Objective Data:    Labs:  Lab Results   Component Value Date/Time    PROTHROMBTM 14.2 07/13/2023 10:55 PM    INR 1.11 07/13/2023 10:55 PM      Lab Results   Component Value Date/Time    WBC 7.5 07/15/2023 03:20 AM    RBC 4.97 07/15/2023 03:20 AM    HEMOGLOBIN 15.0 07/15/2023 03:20 AM    HEMATOCRIT 42.1 07/15/2023 03:20 AM    MCV 84.7 07/15/2023 03:20 AM    MCH 30.2 07/15/2023 03:20 AM    MCHC 35.6 07/15/2023 03:20 AM    MPV 9.6 07/15/2023 03:20 AM    NEUTSPOLYS 75.80 (H) 07/15/2023 03:20 AM    LYMPHOCYTES 12.80 (L)  07/15/2023 03:20 AM    MONOCYTES 10.30 07/15/2023 03:20 AM    EOSINOPHILS 0.40 07/15/2023 03:20 AM    BASOPHILS 0.30 07/15/2023 03:20 AM      Lab Results   Component Value Date/Time    SODIUM 133 (L) 07/15/2023 03:20 AM    POTASSIUM 3.5 (L) 07/15/2023 03:20 AM    CHLORIDE 101 07/15/2023 03:20 AM    CO2 23 07/15/2023 03:20 AM    GLUCOSE 113 (H) 07/15/2023 03:20 AM    BUN 19 07/15/2023 03:20 AM    CREATININE 0.78 07/15/2023 03:20 AM      Lab Results   Component Value Date/Time    CHOLSTRLTOT 161 07/14/2023 04:05 AM     (H) 07/14/2023 04:05 AM    HDL 44 07/14/2023 04:05 AM    TRIGLYCERIDE 67 07/14/2023 04:05 AM       Lab Results   Component Value Date/Time    ALKPHOSPHAT 73 07/13/2023 10:55 PM    ASTSGOT 26 07/13/2023 10:55 PM    ALTSGPT 18 07/13/2023 10:55 PM    TBILIRUBIN 0.4 07/13/2023 10:55 PM        Imaging/Testing:  CT-HEAD W/O   Final Result      1.  Evolving infarcts in the left basal ganglia and in the left frontotemporal and temporoparietal regions, in the left MCA distribution. Minimal hemorrhage in the basal ganglia infarct.   2.  No midline shift or herniation.         EC-ECHOCARDIOGRAM COMPLETE W/O CONT   Final Result      CT-HEAD W/O   Final Result      1. No CT evidence of acute infarct, hemorrhage or mass. No loss of gray-white matter differentiation.   2. No evidence of MCA reocclusion on this study.   3. Mild global parenchymal atrophy. Chronic small vessel ischemic changes.      DX-CHEST-PORTABLE (1 VIEW)   Final Result      No acute cardiopulmonary abnormality.         CT-CTA NECK WITH & W/O-POST PROCESSING   Final Result      1.  Sluggish flow in the distal left internal carotid artery, with occlusion of the carotid terminus.   2.  No high-grade stenosis or occlusion of the proximal left internal carotid artery.   3.  Patent right carotid and vertebral arteries, without occlusion, aneurysm or high-grade stenosis.      CT-CTA HEAD WITH & W/O-POST PROCESS   Final Result      Acute occlusion  the distal left internal carotid artery and left M1 segment.      CT-CEREBRAL PERFUSION ANALYSIS   Final Result      1.  Cerebral blood flow less than 30% likely representing completed infarct = 14 mL.      2.  T Max more than 6 seconds likely representing combination of completed infarct and ischemia = 194 mL.      3.  Mismatched volume likely representing ischemic brain/penumbra = 180 mL.      4.  Please note that the cerebral perfusion was performed on the limited brain tissue around the basal ganglia region. Infarct/ischemia outside the CT perfusion sections can be missed in this study.      CT-HEAD W/O   Final Result   Addendum (preliminary) 1 of 1   Findings were communicated to Dr. Fox via [email or web address] system on    7/13/2023 11:17 PM.      Final      1.  Hyperdense left MCA, consistent with left MCA occlusion.   2.  No loss of gray-white matter differentiation at this time.   3.  Mild atrophy and chronic small vessel ischemic changes.         IR-THROMBO MECHANICAL ARTERY,INIT    (Results Pending)   MR-BRAIN-W/O    (Results Pending)         Assessment and Plan:    77-year-old male presenting with right-sided hemiparesis found to have a left M1 occlusion with distal ICA severe stenosis.  Status post thrombectomy and TNK.  Etiology is unknown at this time.  Patient stroke scale still 14.  However overall he appears slightly improved the neglect is improved.  The gaze preference is improved.  We will get MRI brain today.  Will need to be on dual antiplatelet therapy given the severe stenosis in the distal ICA.    Update 7/15  MRI brain shows acute infarct over the left basal ganglia and parietal lobe moderate size.  No hemorrhagic conversion.  Given the size of the infarct burden we will treat with single antiplatelet at this time.  He will need to be on DAPT therapy sometime the future.  He does have this distal ICA stenosis which need to be intervened with a stent sometime in the future but not now  given infarct burden. in the meantime we will need to keep the pressure up slightly.  Above 120 systolic.        Plan:  1.  Aspirin 81 m daily  2.  Blood pressure parameters between 120-160 systolic  3.  Continue telemetry monitoring  4.  TTE without bubble  5.  Start high-dose statin, LDL is 104.  Goal should be below 70  6.  Maintain normal glycemia  7.  Speech/OT/PT okay to start today      Spent over 53 minutes reviewing MRI findings and going to care plan with the primary team including continue with aspirin therapy.  Blood pressure augmentation.    This chart was partially generated using voice recognition technology and sound alike word replacement may be present, best efforts were made to make the chart accurate.    Romel Rosario MD  Board Certified Neurology, ABPN  t) [phone]

## 2023-07-15 NOTE — THERAPY
Speech Language Pathology   Flexible Endoscopic Evaluation of Swallowing (FEES)        Patient Name: Niyah Collins  AGE:  77 y.o., SEX:  male  Medical Record #: [id number]  Date of Service: 7/15/2023    Pertinent Information  Current Method of Nutrition: NPO until cleared by speech pathology  Patient Behaviors: Drowsy, Fatigue   Dentition: Poor, Upper denture   Feeding Tube: None   Tracheostomy: No       Factor(s) Affecting Performance: Impaired endurance, Impaired mental status   Discussed the risks, benefits, and alternatives of the FEES procedure. Patient/family acknowledged and agreed to proceed.    Assessment  Flexible Endoscopic Evaluation of Swallowing (FEES) completed at bedside today. The endoscope was passed transnasally via Right nare to evaluate the anatomy and physiology of swallowing. Pt tolerated the procedure with no apparent distress.    Anatomic Findings: WFL  Vocal Fold Motion: Bilateral movement  Secretion Management: Expectoration of secretions (Required cueing; as secretions sitting in airway prior to PO trials; patient only able to cough out after aspirating thins)  PO Trials: Ice Chips, Thin Liquid, Mildly Thick Liquid, Liquidised      Consistency PAS Score Timing Residue Comments   Thin Liquid 7 (Delayed coughing that took >1 minute to clear aspirates) Pre Swallow, Post swallow Vallecular Residue: Moderate (25%-50%)  Pyriform Sinus Residue: Mild (5%-25%) PAS 5 before the swallow on thins via tsp x2  PAS 7 before and after the swallow on thins via cup sip (cough response was delayed but did clear aspirates)    Mildly Thick 5 Pre Swallow Vallecular Residue: Moderate (25%-50%)  Pyriform Sinus Residue: Mild (5%-25%) PAS 5 before the swallow on cup sips   Liquidised 3 Post Swallow Vallecular Residue: Severe (>50%)  Pyriform Sinus Residue: Mild (5%-25%) PAS 3 after the swallow w/ significant residue that patient had difficulty clearing; residue increased at the UES after all textures with  possible retrograde flow of bolus (has hx of GERD per daughter)    Pudding  Not tested            Soft & Bite Sized  Not tested            Regular Solid  Not tested            Mixed  Not tested              Penetration-Aspiration Scale (PAS)  1     No contrast enters airway  2     Contrast enters the airway, remains above the vocal folds, and is ejected from the airway (not seen in the airway at the end of the swallow).  3     Contrast enters the airway, remains above the vocal folds, and is not ejected from the airway (is seen in the airway after the swallow).  4     Contrast enters the airway, contacts the vocal folds, and is ejected from the airway.  5     Contrast enters the airway, contacts the vocal folds, and is not ejected from the airway  6     Contrast enters the airway, crosses the plane of the vocal folds, and is ejected from the airway.  7     Contrast enters the airway, crosses the plane of the vocal folds, and is not ejected from the airway despite effort.  8     Contrast enters the airway, crosses the plane of the vocal folds, is not ejected from the airway and there is no response to aspiration.    Oral phase:  Characterized by appropriate bolus acceptance from spoon and cup. Straw sips not trialed. Bolus loss appreciated from right side of oral cavity intermittently during study, to which patient did not appear sensate. Mastication not assessed during this study.     Pharyngeal phase:  Characterized by reduced pharyngeal constriction, weak BoT retraction, sluggish epiglottic inversion, laryngeal mistiming and reduced sensation, and impaired LVC. These deficits resulted in the followin.) Aspiration of secretions into upper airway prior to any PO trials to which patient did not appear sensate; patient unable to cough out secretions until reflexive cough was triggered later during study after aspirating thin liquids.   2.) Deep laryngeal penetration before the swallow on thins via tsp and  aspiration before the swallow on thins via cup sip.   3.) Deep laryngeal penetration before the swallow on MTL via cup sip and after the swallow on tsps of applesauce.   4.) Diffuse residue in vallecula and pyriform sinuses (increased on left) after the swallow w/ residue in post cricoid space (most w/ applesauce) w/ suspected slow retrograde flow of bolus from UES (Daughter does report patient has hx of GERD), increasing risk for ascending aspiration.     Compensatory Strategies:  Multiple swallows: INEFFECTIVE   Effortful swallow: INEFFECTIVE   Liquid wash: EFFECTIVE to clear residue from applesauce     Severity Rating:  Severity Rating: DIGEST Safety Grade  DIGEST Safety Grade: Moderate    Clinical Impressions  The pt presents with a moderate oropharyngeal dysphagia likely acute related to new CVA and age w/ other risk factors and co-morbidities. Swallow safety is impaired; swallow efficiency is impaired. Pt appears to be at moderate risk for aspiration PNA, and moderate for malnutrition/dehydration. Non-oral nutrition is indicated. Swallow prognosis is fair given CVA, debility, and lethargy. Patient appears to be a good candidate for behavioral swallow rehabilitation.    Recommendations  Diet Consistency: NPO w/ placement of NGT; pt and family agreeable; OKAY FOR 5-10 SINGLE ICE CHIPS PER HOUR after oral care and when awake, alert, and upright   Medication: Non Oral  Supervision: 1:1 feeding with constant supervision  Positioning: Fully upright and midline during oral intake  Oral Care: Q4h  Additional Instrumentation: Repeat diagnostic study when clinically appropriate    SLP Treatment Plan  Treatment Plan: Dysphagia Treatment  SLP Frequency: 5x Per Week  Estimated Duration: Until Therapy Goals Met    Anticipated Discharge Needs  Discharge Recommendations: Recommend post-acute placement for additional speech therapy services prior to discharge home   Therapy Recommendations Upon DC: Dysphagia Training,  "Community Re-Integration, Patient / Family / Caregiver Education     Patient / Family Goals  Patient / Family Goal #1: \"I care. I care.\" - patient  Short Term Goal # 1: Patient will consume PO trials with SLP only with no overt s/sx of aspiration.  Goal Outcome # 1: Progressing as expected  Short Term Goal # 2: Patient will participate in an instrumental swallow study to determine swallow physiology and inform POC.  Goal Outcome # 2 : Goal met, new goal aded  Short Term Goal # 2 B : Patient will participate in dysphagia exercises with \"good\" accuracy as judged by SLP in 3/4 attempts with moderate verbal/visual cues.    Nereida Echevarria, SLP  "

## 2023-07-15 NOTE — PROGRESS NOTES
"Critical Care Progress Note    Date of admission  7/13/2023    Chief Complaint  \"Ignacio Harvey is a 77 y.o. male who was brought to [facility] from his home in [place], [place] on 7/13/2023 after the patient had a GLF at 2100 and was found to have right sided weakness with a right facial droop and dysarthria.  Upon arrival to [facility] ER, the patient was sent for emergent CT head that was negative for acute bleeding, but did show a hyperdense region to the left MCA distribution consistent with left MCA occlusion.  The patient was seen by Neurology and advised TNKase that was given at 2330.  Neurology and IR evaluated imaging and deemed the patient to be a good candidate for thrombectomy.  The patient was taken to IR for emergent intervention and then will be admitted to the ICU for ongoing care.\" -Dr. Kirkpatrick note       Hospital Course  No notes on file    Interval Problem Update  Reviewed last 24 hour events:  Neuro: Aox3 but not 4, dilaudid for mild headache  HR: SR 70-90  SBP: 110-150, goal >120 <160  Tmax: AF  GI: NPO, FEES today  I/O: condom cath, OK UOP  Lines: PIV  Mobility: PT/OT  Resp: room air   Vte: lovenox  PPI/H2:na  Antibx: na    Statin, ASA, lovenox  Replete K  Update daughter, john, at bedside    Review of Systems  Review of Systems   Unable to perform ROS: Other   Expressive deficit    Vital Signs for last 24 hours   Temp:  [36.1 °C (96.9 °F)-36.8 °C (98.2 °F)] 36.8 °C (98.2 °F)  Pulse:  [] 61  Resp:  [13-43] 17  BP: (109-193)/() 120/76  SpO2:  [93 %-97 %] 96 %    Hemodynamic parameters for last 24 hours       Respiratory Information for the last 24 hours       Physical Exam   Physical Exam  Constitutional:       General: He is not in acute distress.     Appearance: He is ill-appearing.      Comments: Lying in bed sleeping, follows some commands but mostly very sleepy   HENT:      Mouth/Throat:      Mouth: Mucous membranes are dry.   Eyes:      Comments: Small pupil, L gaze preference " "  Cardiovascular:      Rate and Rhythm: Normal rate and regular rhythm.   Pulmonary:      Effort: Pulmonary effort is normal.      Breath sounds: Normal breath sounds.   Abdominal:      General: Bowel sounds are normal.      Palpations: Abdomen is soft.      Tenderness: There is no abdominal tenderness.   Musculoskeletal:      Right lower leg: No edema.      Left lower leg: No edema.   Skin:     General: Skin is warm and dry.   Neurological:      Motor: Weakness present.      Comments: 0/5 RUE  2/5 RLE  5/5 LUE and LLE  L gaze preference, but able to cross midline  R partial hemineglect  R tongue deviation  Very sleepy and follows commands but not all, lethargic  Able to name \"hand\", did not repeat   Psychiatric:      Comments: Unable to assess d/t lethargy         Medications  Current Facility-Administered Medications   Medication Dose Route Frequency Provider Last Rate Last Admin    HYDROmorphone (Dilaudid) injection 0.25 mg  0.25 mg Intravenous Q4HRS PRN Linda Calderon M.D.   0.25 mg at 07/15/23 0910    senna-docusate (Pericolace Or Senokot S) 8.6-50 MG per tablet 2 Tablet  2 Tablet Oral BID Rosemary Kirkpatrick M.D.        And    polyethylene glycol/lytes (Miralax) PACKET 1 Packet  1 Packet Oral QDAY PRN Rosemary Kirkpatrick M.D.        And    magnesium hydroxide (Milk Of Magnesia) suspension 30 mL  30 mL Oral QDAY PRN Rosemary Kirkpatrick M.D.        And    bisacodyl (Dulcolax) suppository 10 mg  10 mg Rectal QDAY PRN Rosemary Kirkpatrick M.D.        ondansetron (Zofran) syringe/vial injection 4 mg  4 mg Intravenous Q4HRS PRN Rosemary Kirkpatrick M.D.        ondansetron (Zofran ODT) dispertab 4 mg  4 mg Oral Q4HRS PRN Rosemary Kirkpatrick M.D.        atorvastatin (Lipitor) tablet 40 mg  40 mg Oral Q EVENING Rosemary Kirkpatrick M.D.        hydrALAZINE (Apresoline) injection 20 mg  20 mg Intravenous Q2HRS PRN Rosemary Kirkpatrick M.D.        labetalol (Normodyne/Trandate) injection 10-20 mg  10-20 mg Intravenous Q10 MIN PRN Rosemary GALLEGOS" MARIA ISABEL Kirkpatrick        [Held by provider] norepinephrine (Levophed) 8 mg in 250 mL NS infusion (premix)  0-1 mcg/kg/min (Ideal) Intravenous Continuous Gabriela Ugarte A.P.R.NKaris 5 mL/hr at 07/15/23 0615 0.03 mcg/kg/min at 07/15/23 0615    enoxaparin (Lovenox) inj 40 mg  40 mg Subcutaneous DAILY AT 1800 Romel Rosario M.D.   40 mg at 07/14/23 2353    aspirin (Asa) suppository 300 mg  300 mg Rectal DAILY Romel Rosario M.D.   300 mg at 07/14/23 2353       Fluids    Intake/Output Summary (Last 24 hours) at 7/15/2023 0929  Last data filed at 7/15/2023 0600  Gross per 24 hour   Intake 76.8 ml   Output 1250 ml   Net -1173.2 ml         Laboratory          Recent Labs     07/13/23  2255 07/14/23  0405 07/15/23  0320   SODIUM 136 137 133*   POTASSIUM 4.3 3.5* 3.5*   CHLORIDE 101 103 101   CO2 26 23 23   BUN 25* 20 19   CREATININE 1.15 0.97 0.78   MAGNESIUM  --  2.1  --    CALCIUM 9.5 9.2 9.1       Recent Labs     07/13/23  2255 07/14/23  0405 07/15/23  0320   ALTSGPT 18  --   --    ASTSGOT 26  --   --    ALKPHOSPHAT 73  --   --    TBILIRUBIN 0.4  --   --    GLUCOSE 117* 115* 113*       Recent Labs     07/13/23  2255 07/15/23  0320   WBC 5.7 7.5   NEUTSPOLYS 58.70 75.80*   LYMPHOCYTES 26.90 12.80*   MONOCYTES 10.20 10.30   EOSINOPHILS 3.00 0.40   BASOPHILS 0.70 0.30   ASTSGOT 26  --    ALTSGPT 18  --    ALKPHOSPHAT 73  --    TBILIRUBIN 0.4  --        Recent Labs     07/13/23  2255 07/15/23  0320   RBC 4.70 4.97   HEMOGLOBIN 14.6 15.0   HEMATOCRIT 41.2* 42.1   PLATELETCT 185 192   PROTHROMBTM 14.2  --    APTT 28.4  --    INR 1.11  --          Imaging  X-Ray:  I have personally reviewed the images and compared with prior images.  CT:    Reviewed    Assessment/Plan  * Acute ischemic left MCA stroke (HCC)- (present on admission)  Assessment & Plan  S/p TNKase at 2330  IR with mechanical thrombectomy with TICI score of 2c  SBP goals > 120 and < 160  Active titration of nicardipine, no Levophed  Neurology following  High  intensity statin  Maintain euglycemia, eunatremia, and normothermia  PT/OT/SLP  MRI brain   ECHO     Left carotid artery stenosis- (present on admission)  Assessment & Plan  Suspected to be culprit for thrombus/stroke  S/p TNKase and thrombectomy  SBP goals > 120 and < 160  Will likely need DAPT after follow-up imaging  Neurology following    Hypertensive urgency- (present on admission)  Assessment & Plan  SBP goals > 120 and < 160  Prn hydralazine and labetalol  Active titration of nicardipine for SBP goals         VTE:  Contraindicated  Ulcer: Not Indicated  Lines: None    I have performed a physical exam and reviewed and updated ROS and Plan today (7/15/2023). In review of yesterday's note (7/14/2023), there are no changes except as documented above.     Discussed patient condition and risk of morbidity and/or mortality with RN, RT, Pharmacy, Charge nurse / hot rounds, Patient, and neurology  The patient remains critically ill status post stroke, thrombolytics and thrombectomy with significant neurologic deficits at very high risk of decompensation and death requiring.  Requiring levophed infusion to maintain adequate perfusing pressure  Critical care time = 50 minutes in directly providing and coordinating critical care and extensive data review.  No time overlap and excludes procedures.
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
## 2023-07-15 NOTE — PROGRESS NOTES
Iris Placement    Tube Team verified patient name and medical record number prior to tube placement. Iris feeding tube (43 inches, 10 Gambian) placed at 63cm in right nare. Per Iris picture, tube appears to be in the stomach.    Nursing Instructions: Await KUB to confirm placement before use for medications or feeding. Stylet, may be removed, please place in labeled bag with insufflation bulb and save in patient medication drawer.

## 2023-07-15 NOTE — THERAPY
Speech Language Pathology   Daily Treatment     Patient Name: Niyah Collins  AGE:  77 y.o., SEX:  male  Medical Record #: [id number]  Date of Service: 7/15/2023      Precautions:  Precautions: Fall Risk, Swallow Precautions         Subjective  RN cleared patient for dysphagia tx. Patient received in bed, sleeping, with son and daughter present at bedside. Patient roused to verbal and tactile cues, though kept eyes closed for most of session.     Assessment  Patient NPO/NN pending SLP re-assessment. Patient consumed PO trials of single ice chips, thins via tsp, and liquidized purees. Patient presented with delayed cough x1 on thins via tsp, which is concerning for airway invasion. PO trials of ice chips and liquidized purees resulted in no s/sx of aspiration. Right-sided anterior bolus loss was noted on thins and purees intermittently and patient was not always sensate to this. Patient did fatigue as session progressed.     Clinical Impressions  Patient still presents with clinical indicators of dysphagia, evidenced by cough x1, decreased sensation, and bolus loss. Patient would benefit from FEES to further evaluate oropharyngeal swallow function and r/o aspiration. Continue strict NPO pending FEES. SLP to follow for FEES today. Patient, children, and RN aware.       Recommendations  Treatment Completed: Dysphagia Treatment       Dysphagia Treatment  Diet Consistency: NPO pending FEES  Instrumentation: FEES  Medication: Crush with applesauce, as appropriate  Supervision: 1:1 feeding with constant supervision  Positioning: Fully upright and midline during oral intake  Oral Care: Q4h        SLP Treatment Plan  Treatment Plan: Dysphagia Treatment  SLP Frequency: 5x Per Week  Estimated Duration: Until Therapy Goals Met      Anticipated Discharge Needs  Discharge Recommendations: Recommend post-acute placement for additional speech therapy services prior to discharge home  Therapy Recommendations Upon DC: Dysphagia  "Training, Community Re-Integration, Patient / Family / Caregiver Education      Patient / Family Goals  Patient / Family Goal #1: \"I care. I care.\" - patient  Short Term Goals  Short Term Goal # 1: Patient will consume PO trials with SLP only with no overt s/sx of aspiration.  Goal Outcome # 1: Progressing as expected  Short Term Goal # 2: Patient will participate in an instrumental swallow study to determine swallow physiology and inform POC.  Goal Outcome # 2 : Progressing as expected      JASMIN Card  "

## 2023-07-15 NOTE — CARE PLAN
The patient is Watcher - Medium risk of patient condition declining or worsening    Shift Goals  Clinical Goals: q1 neuros, MRI, -160  Patient Goals: rest  Family Goals: updates    Progress made toward(s) clinical / shift goals:    Problem: Optimal Care of the Stroke Patient  Goal: Optimal acute care for the stroke patient  Outcome: Progressing     Problem: Neuro Status  Goal: Neuro status will remain stable or improve  Outcome: Progressing     Problem: Hemodynamic Monitoring  Goal: Patient's hemodynamics, fluid balance and neurologic status will be stable or improve  Outcome: Progressing     Problem: Risk for Aspiration  Goal: Patient's risk for aspiration will be absent or decrease  Outcome: Progressing     Problem: Pain - Standard  Goal: Alleviation of pain or a reduction in pain to the patient’s comfort goal  Outcome: Progressing     Problem: Fall Risk  Goal: Patient will remain free from falls  Outcome: Progressing

## 2023-07-16 LAB
ANION GAP SERPL CALC-SCNC: 15 MMOL/L (ref 7–16)
BASOPHILS # BLD AUTO: 0.4 % (ref 0–1.8)
BASOPHILS # BLD: 0.03 K/UL (ref 0–0.12)
BUN SERPL-MCNC: 23 MG/DL (ref 8–22)
CALCIUM SERPL-MCNC: 9.5 MG/DL (ref 8.5–10.5)
CHLORIDE SERPL-SCNC: 100 MMOL/L (ref 96–112)
CO2 SERPL-SCNC: 19 MMOL/L (ref 20–33)
CREAT SERPL-MCNC: 0.86 MG/DL (ref 0.5–1.4)
CRP SERPL HS-MCNC: 4.79 MG/DL (ref 0–0.75)
EOSINOPHIL # BLD AUTO: 0.05 K/UL (ref 0–0.51)
EOSINOPHIL NFR BLD: 0.6 % (ref 0–6.9)
ERYTHROCYTE [DISTWIDTH] IN BLOOD BY AUTOMATED COUNT: 38.3 FL (ref 35.9–50)
GFR SERPLBLD CREATININE-BSD FMLA CKD-EPI: 90 ML/MIN/1.73 M 2
GLUCOSE SERPL-MCNC: 110 MG/DL (ref 65–99)
HCT VFR BLD AUTO: 45.6 % (ref 42–52)
HGB BLD-MCNC: 16.2 G/DL (ref 14–18)
IMM GRANULOCYTES # BLD AUTO: 0.05 K/UL (ref 0–0.11)
IMM GRANULOCYTES NFR BLD AUTO: 0.6 % (ref 0–0.9)
LYMPHOCYTES # BLD AUTO: 0.96 K/UL (ref 1–4.8)
LYMPHOCYTES NFR BLD: 11.7 % (ref 22–41)
MCH RBC QN AUTO: 30.3 PG (ref 27–33)
MCHC RBC AUTO-ENTMCNC: 35.5 G/DL (ref 32.3–36.5)
MCV RBC AUTO: 85.4 FL (ref 81.4–97.8)
MONOCYTES # BLD AUTO: 0.87 K/UL (ref 0–0.85)
MONOCYTES NFR BLD AUTO: 10.6 % (ref 0–13.4)
NEUTROPHILS # BLD AUTO: 6.22 K/UL (ref 1.82–7.42)
NEUTROPHILS NFR BLD: 76.1 % (ref 44–72)
NRBC # BLD AUTO: 0 K/UL
NRBC BLD-RTO: 0 /100 WBC (ref 0–0.2)
PLATELET # BLD AUTO: 177 K/UL (ref 164–446)
PMV BLD AUTO: 9.4 FL (ref 9–12.9)
POTASSIUM SERPL-SCNC: 3.9 MMOL/L (ref 3.6–5.5)
PREALB SERPL-MCNC: 16.7 MG/DL (ref 18–38)
RBC # BLD AUTO: 5.34 M/UL (ref 4.7–6.1)
SODIUM SERPL-SCNC: 134 MMOL/L (ref 135–145)
WBC # BLD AUTO: 8.2 K/UL (ref 4.8–10.8)

## 2023-07-16 PROCEDURE — 700102 HCHG RX REV CODE 250 W/ 637 OVERRIDE(OP): Performed by: INTERNAL MEDICINE

## 2023-07-16 PROCEDURE — 85025 COMPLETE CBC W/AUTO DIFF WBC: CPT

## 2023-07-16 PROCEDURE — 302136 NUTRITION PUMP: Performed by: INTERNAL MEDICINE

## 2023-07-16 PROCEDURE — 99223 1ST HOSP IP/OBS HIGH 75: CPT | Performed by: HOSPITALIST

## 2023-07-16 PROCEDURE — 99291 CRITICAL CARE FIRST HOUR: CPT | Performed by: INTERNAL MEDICINE

## 2023-07-16 PROCEDURE — 99233 SBSQ HOSP IP/OBS HIGH 50: CPT | Performed by: PSYCHIATRY & NEUROLOGY

## 2023-07-16 PROCEDURE — 86140 C-REACTIVE PROTEIN: CPT

## 2023-07-16 PROCEDURE — 80048 BASIC METABOLIC PNL TOTAL CA: CPT

## 2023-07-16 PROCEDURE — A9270 NON-COVERED ITEM OR SERVICE: HCPCS | Performed by: INTERNAL MEDICINE

## 2023-07-16 PROCEDURE — 770000 HCHG ROOM/CARE - INTERMEDIATE ICU *

## 2023-07-16 PROCEDURE — 84134 ASSAY OF PREALBUMIN: CPT

## 2023-07-16 PROCEDURE — 700111 HCHG RX REV CODE 636 W/ 250 OVERRIDE (IP): Mod: JZ | Performed by: PSYCHIATRY & NEUROLOGY

## 2023-07-16 PROCEDURE — 700102 HCHG RX REV CODE 250 W/ 637 OVERRIDE(OP): Performed by: HOSPITALIST

## 2023-07-16 PROCEDURE — A9270 NON-COVERED ITEM OR SERVICE: HCPCS | Performed by: HOSPITALIST

## 2023-07-16 RX ORDER — MIDODRINE HYDROCHLORIDE 5 MG/1
10 TABLET ORAL 3 TIMES DAILY
Status: DISCONTINUED | OUTPATIENT
Start: 2023-07-16 | End: 2023-07-16

## 2023-07-16 RX ORDER — MIDODRINE HYDROCHLORIDE 5 MG/1
10 TABLET ORAL 3 TIMES DAILY
Status: DISCONTINUED | OUTPATIENT
Start: 2023-07-16 | End: 2023-07-18

## 2023-07-16 RX ADMIN — SENNOSIDES AND DOCUSATE SODIUM 2 TABLET: 50; 8.6 TABLET ORAL at 05:55

## 2023-07-16 RX ADMIN — ENOXAPARIN SODIUM 40 MG: 100 INJECTION SUBCUTANEOUS at 16:58

## 2023-07-16 RX ADMIN — MIDODRINE HYDROCHLORIDE 10 MG: 5 TABLET ORAL at 21:07

## 2023-07-16 RX ADMIN — ASPIRIN 81 MG 81 MG: 81 TABLET ORAL at 17:01

## 2023-07-16 RX ADMIN — SENNOSIDES AND DOCUSATE SODIUM 2 TABLET: 50; 8.6 TABLET ORAL at 16:58

## 2023-07-16 RX ADMIN — ATORVASTATIN CALCIUM 40 MG: 40 TABLET, FILM COATED ORAL at 16:58

## 2023-07-16 RX ADMIN — MIDODRINE HYDROCHLORIDE 10 MG: 5 TABLET ORAL at 08:35

## 2023-07-16 RX ADMIN — MIDODRINE HYDROCHLORIDE 10 MG: 5 TABLET ORAL at 15:40

## 2023-07-16 ASSESSMENT — ENCOUNTER SYMPTOMS
COUGH: 0
ABDOMINAL PAIN: 0
FOCAL WEAKNESS: 1
HEADACHES: 1
VOMITING: 0
NAUSEA: 0
CHILLS: 0
HEADACHES: 0
DOUBLE VISION: 0
FEVER: 0
SHORTNESS OF BREATH: 0

## 2023-07-16 ASSESSMENT — FIBROSIS 4 INDEX: FIB4 SCORE: 2.6

## 2023-07-16 ASSESSMENT — PAIN DESCRIPTION - PAIN TYPE
TYPE: ACUTE PAIN

## 2023-07-16 NOTE — CARE PLAN
The patient is Stable - Low risk of patient condition declining or worsening    Shift Goals  Clinical Goals: Q2 neruo, SBP >120  Patient Goals: Rest  Family Goals: BRENDAN    Progress made toward(s) clinical / shift goals:    Problem: Hemodynamic Monitoring  Goal: Patient's hemodynamics, fluid balance and neurologic status will be stable or improve  Outcome: Progressing     Problem: Urinary Elimination  Goal: Establish and maintain regular urinary output  Outcome: Progressing       Patient is not progressing towards the following goals:      Problem: Bowel Elimination  Goal: Establish and maintain regular bowel function  Outcome: Not Progressing
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
## 2023-07-16 NOTE — PROGRESS NOTES
Pt transferred to T634 via wheelchair after giving report to Dong ALMENDAREZ.  Pt transferred with all personal belongings (clothing).  Left voicemail for his daughter Teri and attempted to call his son Pramod.

## 2023-07-16 NOTE — ASSESSMENT & PLAN NOTE
MRI brain with modest stroke burden in L MCA territory that precludes expedited intracranial stent placement and DAPT due to high risk for catastrophic reperfusion injury.   Per neurology: Eventual DAPT, and consideration of intracranial stent placement if significant recovery seen in upcoming months (as currently with relatively dense L MCA syndrome).  -- follow up with vascular surgery or IR  SBP goal 120-160. On midodrine
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
## 2023-07-16 NOTE — ASSESSMENT & PLAN NOTE
Pressures now have been controlled  Per neurology mild acute hypertensive response to ensure adequate flow through his flow limiting stenosis.   SBP goal 120-160  On midodrine  Cont monitoring Bp

## 2023-07-16 NOTE — CARE PLAN
The patient is Watcher - Medium risk of patient condition declining or worsening    Shift Goals  Clinical Goals: -160, mobilize, stable neuro  Patient Goals: sleep  Family Goals: cuco    Progress made toward(s) clinical / shift goals:  BP stabilized, VS taken Q4 hours, pt on continuous cardiac monitoring. Daily labs drawn.          Problem: Optimal Care of the Stroke Patient  Goal: Optimal emergency care for the stroke patient  Outcome: Progressing  Flowsheets (Taken 7/16/2023 1150)  Emergency Care:   Establised time of last known well   Educated patient/family on diagnosis, medications and testing   NIHSS completed within 12 hours of arrival to hospital   Administered antihypertensive medications per order   Verified consult orders placed   Administerd thrombolytic therapy per order     Problem: Neuro Status  Goal: Neuro status will remain stable or improve  Outcome: Progressing  Note: Pt has no acute neuro changes at this time.      Problem: Pain - Standard  Goal: Alleviation of pain or a reduction in pain to the patient’s comfort goal  Outcome: Progressing  Note: Pt assessed for pain regularly and medicated PRN per MAR.       Problem: Skin Integrity  Goal: Skin integrity is maintained or improved  Outcome: Progressing  Note: Pt assessed for pain regularly and medicated PRN per MAR.

## 2023-07-16 NOTE — PROGRESS NOTES
Neurology Progress Note  Neurohospitalist Service, [facility]    Referring Physician: Rosemary Kirkpatrick M.D.    Chief Complaint   Patient presents with    Possible Stroke     BIB [facility] from his home in [place]. Pt had a GLF at 2100, no trauma noted. LKW 2120, pt was found to have right sided weakness, right facial droop and dysarthria. GCS 15.   No thinners        HPI: Refer to initial documented Neurology H&P, as detailed in the patient's chart.    Interval History 7/16: Patient continues to be plegic on the right side.  However still slightly more awake today conversive.    Review of systems: In addition to what is detailed in the HPI and/or updated in the interval history, all other Medications:    Current Facility-Administered Medications:     midodrine (Proamatine) tablet 10 mg, 10 mg, Oral, TID, Linda Calderon M.D.    HYDROmorphone (Dilaudid) injection 0.25 mg, 0.25 mg, Intravenous, Q4HRS PRN, Linda Calderon M.D., 0.25 mg at 07/15/23 0910    Pharmacy Consult: Enteral tube insertion - review meds/change route/product selection, 1 Each, Other, PHARMACY TO DOSE, Linda Calderon M.D.    atorvastatin (Lipitor) tablet 40 mg, 40 mg, Enteral Tube, Q EVENING, Linda Calderon M.D.    ondansetron (Zofran ODT) dispertab 4 mg, 4 mg, Enteral Tube, Q4HRS PRN, Linda Calderon M.D.    senna-docusate (Pericolace Or Senokot S) 8.6-50 MG per tablet 2 Tablet, 2 Tablet, Enteral Tube, BID, 2 Tablet at 07/16/23 0555 **AND** polyethylene glycol/lytes (Miralax) PACKET 1 Packet, 1 Packet, Enteral Tube, QDAY PRN **AND** magnesium hydroxide (Milk Of Magnesia) suspension 30 mL, 30 mL, Enteral Tube, QDAY PRN **AND** bisacodyl (Dulcolax) suppository 10 mg, 10 mg, Rectal, QDAY PRN, Linda Calderon M.D.    aspirin (Asa) chewable tab 81 mg, 81 mg, Enteral Tube, DAILY, 81 mg at 07/15/23 2029 **OR** aspirin (Asa) suppository 300 mg, 300 mg, Rectal, DAILY, Linda Calderon M.D.    ondansetron (Zofran)  syringe/vial injection 4 mg, 4 mg, Intravenous, Q4HRS PRN, Rosemary Kirkpatrick M.D.    hydrALAZINE (Apresoline) injection 20 mg, 20 mg, Intravenous, Q2HRS PRN, Rosemary Kirkpatrick M.D.    labetalol (Normodyne/Trandate) injection 10-20 mg, 10-20 mg, Intravenous, Q10 MIN PRN, Rosemary Kirkpatrick M.D.    norepinephrine (Levophed) 8 mg in 250 mL NS infusion (premix), 0-1 mcg/kg/min (Ideal), Intravenous, Continuous, RUTH Luke, Paused at 07/16/23 0634    enoxaparin (Lovenox) inj 40 mg, 40 mg, Subcutaneous, DAILY AT 1800, Romel Rosario M.D., 40 mg at 07/15/23 1800    Physical Examination:     Vitals:    07/16/23 0600 07/16/23 0615 07/16/23 0700 07/16/23 0800   BP: (!) 148/101 131/79 127/84 (!) 142/81   Pulse: 67 86 69 69   Resp: (!) 9 (!) 23 (!) 29 16   Temp: 36.5 °C (97.7 °F)      TempSrc: Temporal      SpO2: 92% 95% 94% 92%   Weight:       Height:             NEUROLOGICAL EXAM:       Patient is awake and alert.  [facility] is a hospital.  Follows commands.  Cranial nerves II to XII shows a right facial droop.  He has a left gaze preference that he is able to overcome past midline with encouragement.  Severe dysarthria.  Pupils are equal reactive.  Motor examination he has sustained antigravity in the left arm and leg.   the right arm and leg had no movement.  Sensation is decreased on the right compared to left.  He does have neglect to the left side.  Toes upgoing on both sides.  Cannot assess gait due to weakness              Objective Data:    Labs:  Lab Results   Component Value Date/Time    PROTHROMBTM 14.2 07/13/2023 10:55 PM    INR 1.11 07/13/2023 10:55 PM      Lab Results   Component Value Date/Time    WBC 8.2 07/16/2023 03:45 AM    RBC 5.34 07/16/2023 03:45 AM    HEMOGLOBIN 16.2 07/16/2023 03:45 AM    HEMATOCRIT 45.6 07/16/2023 03:45 AM    MCV 85.4 07/16/2023 03:45 AM    MCH 30.3 07/16/2023 03:45 AM    MCHC 35.5 07/16/2023 03:45 AM    MPV 9.4 07/16/2023 03:45 AM    NEUTSPOLYS 76.10 (H) 07/16/2023 03:45  AM    LYMPHOCYTES 11.70 (L) 07/16/2023 03:45 AM    MONOCYTES 10.60 07/16/2023 03:45 AM    EOSINOPHILS 0.60 07/16/2023 03:45 AM    BASOPHILS 0.40 07/16/2023 03:45 AM      Lab Results   Component Value Date/Time    SODIUM 134 (L) 07/16/2023 03:45 AM    POTASSIUM 3.9 07/16/2023 03:45 AM    CHLORIDE 100 07/16/2023 03:45 AM    CO2 19 (L) 07/16/2023 03:45 AM    GLUCOSE 110 (H) 07/16/2023 03:45 AM    BUN 23 (H) 07/16/2023 03:45 AM    CREATININE 0.86 07/16/2023 03:45 AM      Lab Results   Component Value Date/Time    CHOLSTRLTOT 161 07/14/2023 04:05 AM     (H) 07/14/2023 04:05 AM    HDL 44 07/14/2023 04:05 AM    TRIGLYCERIDE 67 07/14/2023 04:05 AM       Lab Results   Component Value Date/Time    ALKPHOSPHAT 73 07/13/2023 10:55 PM    ASTSGOT 26 07/13/2023 10:55 PM    ALTSGPT 18 07/13/2023 10:55 PM    TBILIRUBIN 0.4 07/13/2023 10:55 PM        Imaging/Testing:  DX-ABDOMEN FOR TUBE PLACEMENT   Final Result      Feeding tube tip projects in the gastric fundus.      CT-HEAD W/O   Final Result      1.  Evolving infarcts in the left basal ganglia and in the left frontotemporal and temporoparietal regions, in the left MCA distribution. Minimal hemorrhage in the basal ganglia infarct.   2.  No midline shift or herniation.         MR-BRAIN-W/O   Final Result      1.  Moderate-sized acute infarct involving the left MCA territory. Limited evaluation demonstrates no definite evidence of hemorrhagic transformation.   2.  Mild diffuse cerebral substance loss.   3.  Mild microangiopathic ischemic change versus demyelination or gliosis.      EC-ECHOCARDIOGRAM COMPLETE W/O CONT   Final Result      CT-HEAD W/O   Final Result      1. No CT evidence of acute infarct, hemorrhage or mass. No loss of gray-white matter differentiation.   2. No evidence of MCA reocclusion on this study.   3. Mild global parenchymal atrophy. Chronic small vessel ischemic changes.      DX-CHEST-PORTABLE (1 VIEW)   Final Result      No acute cardiopulmonary  abnormality.         CT-CTA NECK WITH & W/O-POST PROCESSING   Final Result      1.  Sluggish flow in the distal left internal carotid artery, with occlusion of the carotid terminus.   2.  No high-grade stenosis or occlusion of the proximal left internal carotid artery.   3.  Patent right carotid and vertebral arteries, without occlusion, aneurysm or high-grade stenosis.      CT-CTA HEAD WITH & W/O-POST PROCESS   Final Result      Acute occlusion the distal left internal carotid artery and left M1 segment.      CT-CEREBRAL PERFUSION ANALYSIS   Final Result      1.  Cerebral blood flow less than 30% likely representing completed infarct = 14 mL.      2.  T Max more than 6 seconds likely representing combination of completed infarct and ischemia = 194 mL.      3.  Mismatched volume likely representing ischemic brain/penumbra = 180 mL.      4.  Please note that the cerebral perfusion was performed on the limited brain tissue around the basal ganglia region. Infarct/ischemia outside the CT perfusion sections can be missed in this study.      CT-HEAD W/O   Final Result   Addendum (preliminary) 1 of 1   Findings were communicated to Dr. Fox via [employer] system on    7/13/2023 11:17 PM.      Final      1.  Hyperdense left MCA, consistent with left MCA occlusion.   2.  No loss of gray-white matter differentiation at this time.   3.  Mild atrophy and chronic small vessel ischemic changes.         IR-THROMBO MECHANICAL ARTERY,INIT    (Results Pending)         Assessment and Plan:    77-year-old male presenting with right-sided hemiparesis found to have a left M1 occlusion with distal ICA severe stenosis.  Status post thrombectomy and TNK.  Etiology is unknown at this time.  Patient stroke scale still 14.  However overall he appears slightly improved the neglect is improved.  The gaze preference is improved.  We will get MRI brain today.  Will need to be on dual antiplatelet therapy given the severe stenosis in the  distal ICA.    Update 7/15  MRI brain shows acute infarct over the left basal ganglia and parietal lobe moderate size.  No hemorrhagic conversion.  Given the size of the infarct burden we will treat with single antiplatelet at this time.  He will need to be on DAPT therapy sometime the future.  He does have this distal ICA stenosis which need to be intervened with a stent sometime in the future but not now given infarct burden. in the meantime we will need to keep the pressure up slightly.  Above 120 systolic.    Update 7/16  Patient slightly more awake and alert this morning.  He is conversive however difficulty understanding with his severe dysarthria.  Failed fees examination yesterday.  Has right-sided hemiplegia.  At this time recommend continuation of supportive therapy.  Consider PEG tube if the patient does not improve the next few days.  Keep the blood pressure above 120 systolic.  I believe the patient is right hemispheric dominant.  I believe in the long-term patient may improve to having hemiparesis and significant plegic on the right side and being conversive.  Prognosis is fair therefore recommended continuation of supportive therapies and place a PEG tube if needed later this week.  Plan on adding Plavix the next 7 to 14 days depending on clinical course.  Long-term may consider stenting the distal ICA stenosis.        Plan:  1.  Aspirin 81 m daily  2.  Blood pressure parameters between 120-160 systolic  3.  Continue telemetry monitoring  4.  TTE without bubble  5.  Start high-dose statin, LDL is 104.  Goal should be below 70  6.  Maintain normal glycemia  7.  Speech/OT/PT       Spent over 52 minutes going over the care plan with the primary physician okay to transition to [facility].  Continue with supportive therapies.    This chart was partially generated using voice recognition technology and sound alike word replacement may be present, best efforts were made to make the chart accurate.    Romel  MD Marlene  Board Certified Neurology, ABPN  (t) [phone]     Head atraumatic, normal cephalic shape.

## 2023-07-16 NOTE — ASSESSMENT & PLAN NOTE
CTA head: Acute occlusion the distal left internal carotid artery and left M1 segment  MRI brain: Moderate-sized acute infarct involving the left MCA territory  Likely cardioembolic from critical carotid stenosis    s/p IV-TNK and TICI 2c mechanical thrombectomy  Neurology recommend mild acute hypertensive response to ensure adequate flow through his flow limiting stenosis. Eventual DAPT, and consideration of intracranial stent placement if significant recovery seen in upcoming months (as currently with relatively dense L MCA syndrome).    SBP goal 120-160.   High-dose statin  ASA 81mg daily. Start plavix for 90 day course in 2 weeks- start date is 7/27 ( will need 6 month of DAPT if stent is eventually placed)  On midodrine  PT/OT/SLP/PMR  neurocheck  Ziopatch at discharge  RUQ ultrasound unremarkable - resume statin  PEG in place
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
## 2023-07-17 LAB
ANION GAP SERPL CALC-SCNC: 13 MMOL/L (ref 7–16)
BASOPHILS # BLD AUTO: 0.2 % (ref 0–1.8)
BASOPHILS # BLD: 0.02 K/UL (ref 0–0.12)
BUN SERPL-MCNC: 27 MG/DL (ref 8–22)
CALCIUM SERPL-MCNC: 9.5 MG/DL (ref 8.5–10.5)
CHLORIDE SERPL-SCNC: 96 MMOL/L (ref 96–112)
CO2 SERPL-SCNC: 21 MMOL/L (ref 20–33)
CREAT SERPL-MCNC: 0.87 MG/DL (ref 0.5–1.4)
CRP SERPL HS-MCNC: 5.1 MG/DL (ref 0–0.75)
EOSINOPHIL # BLD AUTO: 0.02 K/UL (ref 0–0.51)
EOSINOPHIL NFR BLD: 0.2 % (ref 0–6.9)
ERYTHROCYTE [DISTWIDTH] IN BLOOD BY AUTOMATED COUNT: 37.2 FL (ref 35.9–50)
GFR SERPLBLD CREATININE-BSD FMLA CKD-EPI: 90 ML/MIN/1.73 M 2
GLUCOSE SERPL-MCNC: 155 MG/DL (ref 65–99)
HCT VFR BLD AUTO: 46.7 % (ref 42–52)
HGB BLD-MCNC: 16.9 G/DL (ref 14–18)
IMM GRANULOCYTES # BLD AUTO: 0.05 K/UL (ref 0–0.11)
IMM GRANULOCYTES NFR BLD AUTO: 0.5 % (ref 0–0.9)
LYMPHOCYTES # BLD AUTO: 0.56 K/UL (ref 1–4.8)
LYMPHOCYTES NFR BLD: 5.8 % (ref 22–41)
MCH RBC QN AUTO: 30.3 PG (ref 27–33)
MCHC RBC AUTO-ENTMCNC: 36.2 G/DL (ref 32.3–36.5)
MCV RBC AUTO: 83.8 FL (ref 81.4–97.8)
MONOCYTES # BLD AUTO: 0.81 K/UL (ref 0–0.85)
MONOCYTES NFR BLD AUTO: 8.4 % (ref 0–13.4)
NEUTROPHILS # BLD AUTO: 8.16 K/UL (ref 1.82–7.42)
NEUTROPHILS NFR BLD: 84.9 % (ref 44–72)
NRBC # BLD AUTO: 0 K/UL
NRBC BLD-RTO: 0 /100 WBC (ref 0–0.2)
PLATELET # BLD AUTO: 199 K/UL (ref 164–446)
PMV BLD AUTO: 9.4 FL (ref 9–12.9)
POTASSIUM SERPL-SCNC: 3.8 MMOL/L (ref 3.6–5.5)
RBC # BLD AUTO: 5.57 M/UL (ref 4.7–6.1)
SODIUM SERPL-SCNC: 130 MMOL/L (ref 135–145)
WBC # BLD AUTO: 9.6 K/UL (ref 4.8–10.8)

## 2023-07-17 PROCEDURE — A9270 NON-COVERED ITEM OR SERVICE: HCPCS | Performed by: HOSPITALIST

## 2023-07-17 PROCEDURE — 85025 COMPLETE CBC W/AUTO DIFF WBC: CPT

## 2023-07-17 PROCEDURE — 99232 SBSQ HOSP IP/OBS MODERATE 35: CPT | Performed by: HOSPITALIST

## 2023-07-17 PROCEDURE — 80048 BASIC METABOLIC PNL TOTAL CA: CPT

## 2023-07-17 PROCEDURE — 97535 SELF CARE MNGMENT TRAINING: CPT

## 2023-07-17 PROCEDURE — A9270 NON-COVERED ITEM OR SERVICE: HCPCS | Performed by: INTERNAL MEDICINE

## 2023-07-17 PROCEDURE — 770001 HCHG ROOM/CARE - MED/SURG/GYN PRIV*

## 2023-07-17 PROCEDURE — 700102 HCHG RX REV CODE 250 W/ 637 OVERRIDE(OP): Performed by: HOSPITALIST

## 2023-07-17 PROCEDURE — A9270 NON-COVERED ITEM OR SERVICE: HCPCS | Performed by: PSYCHIATRY & NEUROLOGY

## 2023-07-17 PROCEDURE — 99232 SBSQ HOSP IP/OBS MODERATE 35: CPT | Performed by: PSYCHIATRY & NEUROLOGY

## 2023-07-17 PROCEDURE — 97167 OT EVAL HIGH COMPLEX 60 MIN: CPT

## 2023-07-17 PROCEDURE — 700102 HCHG RX REV CODE 250 W/ 637 OVERRIDE(OP): Performed by: INTERNAL MEDICINE

## 2023-07-17 PROCEDURE — 86140 C-REACTIVE PROTEIN: CPT

## 2023-07-17 PROCEDURE — 700111 HCHG RX REV CODE 636 W/ 250 OVERRIDE (IP): Mod: JZ | Performed by: PSYCHIATRY & NEUROLOGY

## 2023-07-17 PROCEDURE — 700102 HCHG RX REV CODE 250 W/ 637 OVERRIDE(OP): Performed by: PSYCHIATRY & NEUROLOGY

## 2023-07-17 RX ORDER — ATORVASTATIN CALCIUM 80 MG/1
80 TABLET, FILM COATED ORAL EVERY EVENING
Status: DISCONTINUED | OUTPATIENT
Start: 2023-07-17 | End: 2023-07-27 | Stop reason: HOSPADM

## 2023-07-17 RX ADMIN — MIDODRINE HYDROCHLORIDE 10 MG: 5 TABLET ORAL at 15:57

## 2023-07-17 RX ADMIN — ASPIRIN 81 MG 81 MG: 81 TABLET ORAL at 18:00

## 2023-07-17 RX ADMIN — ATORVASTATIN CALCIUM 80 MG: 80 TABLET, FILM COATED ORAL at 17:58

## 2023-07-17 RX ADMIN — MIDODRINE HYDROCHLORIDE 10 MG: 5 TABLET ORAL at 07:48

## 2023-07-17 RX ADMIN — ENOXAPARIN SODIUM 40 MG: 100 INJECTION SUBCUTANEOUS at 17:58

## 2023-07-17 ASSESSMENT — COGNITIVE AND FUNCTIONAL STATUS - GENERAL
SUGGESTED CMS G CODE MODIFIER DAILY ACTIVITY: CM
HELP NEEDED FOR BATHING: TOTAL
PERSONAL GROOMING: A LOT
DRESSING REGULAR UPPER BODY CLOTHING: A LOT
DRESSING REGULAR LOWER BODY CLOTHING: TOTAL
EATING MEALS: TOTAL
TOILETING: TOTAL
DAILY ACTIVITIY SCORE: 8

## 2023-07-17 ASSESSMENT — PAIN DESCRIPTION - PAIN TYPE
TYPE: ACUTE PAIN

## 2023-07-17 ASSESSMENT — ENCOUNTER SYMPTOMS
BLURRED VISION: 0
NAUSEA: 0
FEVER: 0
FOCAL WEAKNESS: 1
VOMITING: 0
ABDOMINAL PAIN: 0
SORE THROAT: 0
DOUBLE VISION: 0
LOSS OF CONSCIOUSNESS: 0
HEADACHES: 0
COUGH: 0
CHILLS: 0
DIARRHEA: 0
BACK PAIN: 0
DIZZINESS: 0
PALPITATIONS: 0
SHORTNESS OF BREATH: 0

## 2023-07-17 NOTE — DISCHARGE PLANNING
[facility] Transitional [facility] Coordination    Physiatry consult complete.  Dr. Law recommending - Patient is not a candidate for IPR due to lack of discharge support.    Volate update to [facility] NAVYA Mckeon.

## 2023-07-17 NOTE — CARE PLAN
The patient is Watcher - Medium risk of patient condition declining or worsening    Shift Goals  Clinical Goals: -160, stable neuro  Patient Goals: Rest  Family Goals: BRENDAN    Progress made toward(s) clinical / shift goals:    Problem: Optimal Care of the Stroke Patient  Goal: Optimal emergency care for the stroke patient  Outcome: Progressing     Problem: Knowledge Deficit - Stroke Education  Goal: Patient's knowledge of stroke and risk factors will improve  Outcome: Progressing     Problem: Psychosocial - Patient Condition  Goal: Patient's ability to verbalize feelings about condition will improve  Outcome: Progressing     Problem: Discharge Planning - Stroke  Goal: Ensure Stroke Core Measures are met prior to discharge  Outcome: Progressing     Problem: Neuro Status  Goal: Neuro status will remain stable or improve  Outcome: Progressing     Problem: Hemodynamic Monitoring  Goal: Patient's hemodynamics, fluid balance and neurologic status will be stable or improve  Outcome: Progressing     Problem: Respiratory - Stroke Patient  Goal: Patient will achieve/maintain optimum respiratory rate/effort  Outcome: Progressing     Problem: Dysphagia  Goal: Dysphagia will improve  Outcome: Progressing     Problem: Risk for Aspiration  Goal: Patient's risk for aspiration will be absent or decrease  Outcome: Progressing     Problem: Urinary Elimination  Goal: Establish and maintain regular urinary output  Outcome: Progressing     Problem: Bowel Elimination  Goal: Establish and maintain regular bowel function  Outcome: Progressing     Problem: Mobility - Stroke  Goal: Patient's capacity to carry out activities will improve  Outcome: Progressing     Problem: Self Care  Goal: Patient will have the ability to perform ADLs independently or with assistance (bathe, groom, dress, toilet and feed)  Outcome: Progressing     Problem: Knowledge Deficit - Standard  Goal: Patient and family/care givers will demonstrate understanding of  plan of care, disease process/condition, diagnostic tests and medications  Outcome: Progressing     Problem: Pain - Standard  Goal: Alleviation of pain or a reduction in pain to the patient’s comfort goal  Outcome: Progressing     Problem: Fall Risk  Goal: Patient will remain free from falls  Outcome: Progressing     Problem: Skin Integrity  Goal: Skin integrity is maintained or improved  Outcome: Progressing       Patient is not progressing towards the following goals:
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
## 2023-07-17 NOTE — DIETARY
Nutrition Services Brief Update:    Problem: Nutritional:  Goal: Nutrition support tolerated and meeting greater than 85% of estimated needs  Outcome: Met. Per EMR, TF at goal. Other (Comment) (Isosource 1.5), goal rate 50 ml/hr, providing 1800 kcals, 82 grams protein, 917 mL free water.

## 2023-07-17 NOTE — PROGRESS NOTES
Chart Check Complete    Monitor Summary:  Rhythm and Rate: SR 60-90s bpm  Ectopy: PVCs (o)

## 2023-07-17 NOTE — PROGRESS NOTES
Neurology Progress Note  Neurohospitalist Service, [facility]    Referring Physician: Sanket Perez, *      Interval History: No acute events overnight.  Transferred to [facility].  SBPs mostly > 140    Review of systems: In addition to what is detailed in the HPI and/or updated in the interval history, all other systems reviewed and are negative.    Past Medical History, Past Surgical History and Social History reviewed and unchanged from prior    Medications:    Current Facility-Administered Medications:     atorvastatin (Lipitor) tablet 80 mg, 80 mg, Enteral Tube, Q EVENING, Romel Gutierrez M.D.    midodrine (Proamatine) tablet 10 mg, 10 mg, Enteral Tube, TID, Sanket Perez, D.O., 10 mg at 07/17/23 0748    HYDROmorphone (Dilaudid) injection 0.25 mg, 0.25 mg, Intravenous, Q4HRS PRN, Linda Calderon M.D., 0.25 mg at 07/15/23 0910    Pharmacy Consult: Enteral tube insertion - review meds/change route/product selection, 1 Each, Other, PHARMACY TO DOSE, Linda Calderon M.D.    ondansetron (Zofran ODT) dispertab 4 mg, 4 mg, Enteral Tube, Q4HRS PRN, Linda Calderon M.D.    senna-docusate (Pericolace Or Senokot S) 8.6-50 MG per tablet 2 Tablet, 2 Tablet, Enteral Tube, BID, 2 Tablet at 07/16/23 1658 **AND** polyethylene glycol/lytes (Miralax) PACKET 1 Packet, 1 Packet, Enteral Tube, QDAY PRN **AND** magnesium hydroxide (Milk Of Magnesia) suspension 30 mL, 30 mL, Enteral Tube, QDAY PRN **AND** bisacodyl (Dulcolax) suppository 10 mg, 10 mg, Rectal, QDAY PRN, Linda Calderon M.D.    aspirin (Asa) chewable tab 81 mg, 81 mg, Enteral Tube, DAILY, 81 mg at 07/16/23 1701 **OR** aspirin (Asa) suppository 300 mg, 300 mg, Rectal, DAILY, Linda Calderon M.D.    ondansetron (Zofran) syringe/vial injection 4 mg, 4 mg, Intravenous, Q4HRS PRN, Rosemary Kirkpatrick M.D.    hydrALAZINE (Apresoline) injection 20 mg, 20 mg, Intravenous, Q2HRS PRN, Rosemary Kirkpatrick M.D.    labetalol (Normodyne/Trandate)  "injection 10-20 mg, 10-20 mg, Intravenous, Q10 MIN PRN, Rosemary Kirkpatrick M.D.    norepinephrine (Levophed) 8 mg in 250 mL NS infusion (premix), 0-1 mcg/kg/min (Ideal), Intravenous, Continuous, RUTH Luke, Paused at 07/16/23 0634    enoxaparin (Lovenox) inj 40 mg, 40 mg, Subcutaneous, DAILY AT 1800, Romel Rosario M.D., 40 mg at 07/16/23 1658    Physical Examination:   BP (!) 149/96   Pulse 91   Temp 36 °C (96.8 °F) (Temporal)   Resp (!) 34   Ht 1.956 m (6' 5\")   Wt 77.3 kg (170 lb 6.7 oz)   SpO2 91%   BMI 20.21 kg/m²       General: Patient is lethargic, easily arousable  Neck: There is normal range of motion  CV: Tachycardic  Extremities:  Warm, dry, and intact, without peripheral lower extremity edema    NEUROLOGICAL EXAM:     Mental status: Lethargic, arousable  Speech and language: Speech is dysarthric, speech paucity, but fluent. Able to state name, oriented to place and daughter's name.  Cranial nerve exam: L gaze preference, however crosses midline, R face droop  Motor exam: There is sustained antigravity with no downward drift in L arm and leg.  No movement in R arm and R leg.    Sensory exam:  Reacts to tactile in all 4 extremities, there does appear to be R side neglect  Coordination: No ataxia on elicited L side movements      NIHSS: National Institutes of Health Stroke Scale    [1] 1a:Level of Consciousness    0-alert 1-drowsy   2-stupor   3-coma  [0] 1b:LOC Questions                  0-both  1-one      2-neither  [0] 1c:LOC Commands                   0-both  1-one      2-neither  [1] 2: Best Gaze                     0-nl    1-partial  2-forced  [0] 3: Visual Fields                   0-nl    1-partial  2-complete 3-bilat  [2] 4: Facial Paresis                0-nl    1-minor    2-partial  3-full  MOTOR                       0-nl  [4] 5: Right Arm           1-drift  [0] 6: Left Arm             2-some effort vs gravity  [4] 7: Right Leg           3-no effort vs gravity  [0] 8: Left " Leg             4-no movement                             x-untestable  [0] 9: Limb Ataxia                    0-abs   1-1_limb   2-2+_limbs       x-untestable  [0] 10:Sensory                        0-nl    1-partial  2-dense  [1] 11:Best Language/Aphasia         0-nl    1-mild/mod 2-severe   3-mute  [2] 12:Dysarthria                     0-nl    1-mild/mod 2-severe       x-untestable  [1] 13:Neglect/Inattention            0-none  1-partial  2-complete  [16] TOTAL      Objective Data:    Labs:  Lab Results   Component Value Date/Time    PROTHROMBTM 14.2 07/13/2023 10:55 PM    INR 1.11 07/13/2023 10:55 PM      Lab Results   Component Value Date/Time    WBC 9.6 07/17/2023 02:45 AM    RBC 5.57 07/17/2023 02:45 AM    HEMOGLOBIN 16.9 07/17/2023 02:45 AM    HEMATOCRIT 46.7 07/17/2023 02:45 AM    MCV 83.8 07/17/2023 02:45 AM    MCH 30.3 07/17/2023 02:45 AM    MCHC 36.2 07/17/2023 02:45 AM    MPV 9.4 07/17/2023 02:45 AM    NEUTSPOLYS 84.90 (H) 07/17/2023 02:45 AM    LYMPHOCYTES 5.80 (L) 07/17/2023 02:45 AM    MONOCYTES 8.40 07/17/2023 02:45 AM    EOSINOPHILS 0.20 07/17/2023 02:45 AM    BASOPHILS 0.20 07/17/2023 02:45 AM      Lab Results   Component Value Date/Time    SODIUM 130 (L) 07/17/2023 02:00 AM    POTASSIUM 3.8 07/17/2023 02:00 AM    CHLORIDE 96 07/17/2023 02:00 AM    CO2 21 07/17/2023 02:00 AM    GLUCOSE 155 (H) 07/17/2023 02:00 AM    BUN 27 (H) 07/17/2023 02:00 AM    CREATININE 0.87 07/17/2023 02:00 AM      Lab Results   Component Value Date/Time    CHOLSTRLTOT 161 07/14/2023 04:05 AM     (H) 07/14/2023 04:05 AM    HDL 44 07/14/2023 04:05 AM    TRIGLYCERIDE 67 07/14/2023 04:05 AM       Lab Results   Component Value Date/Time    ALKPHOSPHAT 73 07/13/2023 10:55 PM    ASTSGOT 26 07/13/2023 10:55 PM    ALTSGPT 18 07/13/2023 10:55 PM    TBILIRUBIN 0.4 07/13/2023 10:55 PM        Imaging/Testing:    I interpreted and/or reviewed the patient's neuroimaging    DX-ABDOMEN FOR TUBE PLACEMENT   Final Result      Feeding  tube tip projects in the gastric fundus.      CT-HEAD W/O   Final Result      1.  Evolving infarcts in the left basal ganglia and in the left frontotemporal and temporoparietal regions, in the left MCA distribution. Minimal hemorrhage in the basal ganglia infarct.   2.  No midline shift or herniation.         MR-BRAIN-W/O   Final Result      1.  Moderate-sized acute infarct involving the left MCA territory. Limited evaluation demonstrates no definite evidence of hemorrhagic transformation.   2.  Mild diffuse cerebral substance loss.   3.  Mild microangiopathic ischemic change versus demyelination or gliosis.      EC-ECHOCARDIOGRAM COMPLETE W/O CONT   Final Result      CT-HEAD W/O   Final Result      1. No CT evidence of acute infarct, hemorrhage or mass. No loss of gray-white matter differentiation.   2. No evidence of MCA reocclusion on this study.   3. Mild global parenchymal atrophy. Chronic small vessel ischemic changes.      IR-THROMBO MECHANICAL ARTERY,INIT   Final Result         77-year-old patient who presented with left MCA occlusion and left ICA high-grade stenosis underwent emergent cerebral angiogram which showed free-floating thrombus in the distal left ICA and occlusion of the inferior division MCA. Mechanical    thrombectomy was performed with complete restoration of flow to the left MCA inferior division and resolution of the free floating thrombus in the distal ICA.      Final recanalization score: TICI 2C      I, Earnestine Fox was physically present and participated during the entire procedure of the IR-THROMBO MECHANICAL ARTERY,INIT.                  DX-CHEST-PORTABLE (1 VIEW)   Final Result      No acute cardiopulmonary abnormality.         CT-CTA NECK WITH & W/O-POST PROCESSING   Final Result      1.  Sluggish flow in the distal left internal carotid artery, with occlusion of the carotid terminus.   2.  No high-grade stenosis or occlusion of the proximal left internal carotid artery.   3.   Patent right carotid and vertebral arteries, without occlusion, aneurysm or high-grade stenosis.      CT-CTA HEAD WITH & W/O-POST PROCESS   Final Result      Acute occlusion the distal left internal carotid artery and left M1 segment.      CT-CEREBRAL PERFUSION ANALYSIS   Final Result      1.  Cerebral blood flow less than 30% likely representing completed infarct = 14 mL.      2.  T Max more than 6 seconds likely representing combination of completed infarct and ischemia = 194 mL.      3.  Mismatched volume likely representing ischemic brain/penumbra = 180 mL.      4.  Please note that the cerebral perfusion was performed on the limited brain tissue around the basal ganglia region. Infarct/ischemia outside the CT perfusion sections can be missed in this study.      CT-HEAD W/O   Final Result   Addendum (preliminary) 1 of 1   Findings were communicated to Dr. Fox via [employer] system on    7/13/2023 11:17 PM.      Final      1.  Hyperdense left MCA, consistent with left MCA occlusion.   2.  No loss of gray-white matter differentiation at this time.   3.  Mild atrophy and chronic small vessel ischemic changes.             Assessment and Plan:  Niyah Collins is a 77 year old man hypertension, diabetes, presenting on 7/13 with L MCA syndrome, found to have L M1 occlusion and critical L intracranial ICA stenosis.  He is now s/p IV-TNK and TICI 2c mechanical thrombectomy.  MRI brain with modest stroke burden in L MCA territory that precludes expedited intracranial stent placement and DAPT due to high risk for catastrophic reperfusion injury.  Recommend mild acute hypertensive response to ensure adequate flow through his flow limiting stenosis.  Eventual DAPT, and consideration of intracranial stent placement if significant recovery seen in upcoming months (as currently with relatively dense L MCA syndrome).  He is ambixdetrous, and we suspect language center is R hemisphere given relatively intact  language function.    Problem list:   L MCA stroke   Critical L ICA stenosis   Hypertension  Type 2 diabetes    Plan:   - q4h neurochecks/NIHSS   - SBP goal 120-160 STRICT, avoid hypotension, on midodrine   - continue ASA 81mg daily   - start plavix for 90 day course in 2 weeks- start date is 7/27 ( will need 6 month of DAPT if stent is eventually placed)   - stroke labs:  HgbA1c 5.1,    - continue atorvastatin 80mg daily for goal LDL < 70   - DM management for goal HgbA1c < 7   - although likely atheroembolic disease- complete embolic workup.  TTE without obvious cardioembolic nidus, ziopatch at discharge   - PT/OT/SLP, will likely need PEG placement   - lovenox SQ for DVT chemoppx   - stroke bridge clinic follow up to evaluate candidacy for future stent placement    The evaluation of the patient, and recommended management, was discussed with the attending hospitalist, Dr. Valle. I have performed a physical exam and reviewed and updated ROS and Plan today (7/17/2023).     Romel Gutierrez MD  Neurohospitalist, Acute Care Services
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
## 2023-07-17 NOTE — CARE PLAN
The patient is Watcher - Medium risk of patient condition declining or worsening    Shift Goals  Clinical Goals: Pt will not have any neuro changes during shift  Patient Goals: Rest  Family Goals: BRENDAN    Progress made toward(s) clinical / shift goals:  Pt remains neurologically stable during shift      Problem: Optimal Care of the Stroke Patient  Goal: Optimal emergency care for the stroke patient  Outcome: Progressing  Flowsheets (Taken 7/17/2023 0906)  Emergency Care:   Establised time of last known well   NIHSS completed within 12 hours of arrival to hospital   Administerd thrombolytic therapy per order   Verified consult orders placed   Administered antihypertensive medications per order   Educated patient/family on diagnosis, medications and testing     Problem: Psychosocial - Patient Condition  Goal: Patient's ability to verbalize feelings about condition will improve  Outcome: Progressing  Note: Pt was encouraged to voice feelings     Problem: Hemodynamic Monitoring  Goal: Patient's hemodynamics, fluid balance and neurologic status will be stable or improve  Outcome: Progressing  Note: BP stabilized, VS taken Q4 hours, pt on continuous cardiac monitoring. Daily labs drawn.       Problem: Knowledge Deficit - Standard  Goal: Patient and family/care givers will demonstrate understanding of plan of care, disease process/condition, diagnostic tests and medications  Outcome: Progressing  Note: Pt educated regarding plan of care and medications. All questions answered.

## 2023-07-17 NOTE — PROGRESS NOTES
Pt had a 5 second run of PSVT during shift. Pt is asymptomatic. Dr. Valle was updated and at bedside.

## 2023-07-18 ENCOUNTER — APPOINTMENT (OUTPATIENT)
Dept: RADIOLOGY | Facility: MEDICAL CENTER | Age: 78
DRG: 023 | End: 2023-07-18
Attending: HOSPITALIST
Payer: MEDICARE

## 2023-07-18 LAB
ANION GAP SERPL CALC-SCNC: 13 MMOL/L (ref 7–16)
BASOPHILS # BLD AUTO: 0.3 % (ref 0–1.8)
BASOPHILS # BLD: 0.03 K/UL (ref 0–0.12)
BUN SERPL-MCNC: 35 MG/DL (ref 8–22)
CALCIUM SERPL-MCNC: 9.5 MG/DL (ref 8.5–10.5)
CHLORIDE SERPL-SCNC: 98 MMOL/L (ref 96–112)
CO2 SERPL-SCNC: 22 MMOL/L (ref 20–33)
CREAT SERPL-MCNC: 0.9 MG/DL (ref 0.5–1.4)
EOSINOPHIL # BLD AUTO: 0.03 K/UL (ref 0–0.51)
EOSINOPHIL NFR BLD: 0.3 % (ref 0–6.9)
ERYTHROCYTE [DISTWIDTH] IN BLOOD BY AUTOMATED COUNT: 38.3 FL (ref 35.9–50)
GFR SERPLBLD CREATININE-BSD FMLA CKD-EPI: 89 ML/MIN/1.73 M 2
GLUCOSE SERPL-MCNC: 133 MG/DL (ref 65–99)
HCT VFR BLD AUTO: 46.7 % (ref 42–52)
HGB BLD-MCNC: 16.7 G/DL (ref 14–18)
IMM GRANULOCYTES # BLD AUTO: 0.04 K/UL (ref 0–0.11)
IMM GRANULOCYTES NFR BLD AUTO: 0.4 % (ref 0–0.9)
LYMPHOCYTES # BLD AUTO: 0.63 K/UL (ref 1–4.8)
LYMPHOCYTES NFR BLD: 6.7 % (ref 22–41)
MCH RBC QN AUTO: 29.9 PG (ref 27–33)
MCHC RBC AUTO-ENTMCNC: 35.8 G/DL (ref 32.3–36.5)
MCV RBC AUTO: 83.5 FL (ref 81.4–97.8)
MONOCYTES # BLD AUTO: 0.85 K/UL (ref 0–0.85)
MONOCYTES NFR BLD AUTO: 9 % (ref 0–13.4)
NEUTROPHILS # BLD AUTO: 7.89 K/UL (ref 1.82–7.42)
NEUTROPHILS NFR BLD: 83.3 % (ref 44–72)
NRBC # BLD AUTO: 0 K/UL
NRBC BLD-RTO: 0 /100 WBC (ref 0–0.2)
PLATELET # BLD AUTO: 219 K/UL (ref 164–446)
PMV BLD AUTO: 9.9 FL (ref 9–12.9)
POTASSIUM SERPL-SCNC: 3.8 MMOL/L (ref 3.6–5.5)
RBC # BLD AUTO: 5.59 M/UL (ref 4.7–6.1)
SODIUM SERPL-SCNC: 133 MMOL/L (ref 135–145)
WBC # BLD AUTO: 9.5 K/UL (ref 4.8–10.8)

## 2023-07-18 PROCEDURE — 97112 NEUROMUSCULAR REEDUCATION: CPT

## 2023-07-18 PROCEDURE — 99232 SBSQ HOSP IP/OBS MODERATE 35: CPT | Performed by: HOSPITALIST

## 2023-07-18 PROCEDURE — 97162 PT EVAL MOD COMPLEX 30 MIN: CPT

## 2023-07-18 PROCEDURE — 85025 COMPLETE CBC W/AUTO DIFF WBC: CPT

## 2023-07-18 PROCEDURE — 700102 HCHG RX REV CODE 250 W/ 637 OVERRIDE(OP): Performed by: PSYCHIATRY & NEUROLOGY

## 2023-07-18 PROCEDURE — 80048 BASIC METABOLIC PNL TOTAL CA: CPT

## 2023-07-18 PROCEDURE — A9270 NON-COVERED ITEM OR SERVICE: HCPCS | Performed by: PSYCHIATRY & NEUROLOGY

## 2023-07-18 PROCEDURE — 36415 COLL VENOUS BLD VENIPUNCTURE: CPT

## 2023-07-18 PROCEDURE — 700102 HCHG RX REV CODE 250 W/ 637 OVERRIDE(OP): Performed by: INTERNAL MEDICINE

## 2023-07-18 PROCEDURE — 700111 HCHG RX REV CODE 636 W/ 250 OVERRIDE (IP): Mod: JZ | Performed by: PSYCHIATRY & NEUROLOGY

## 2023-07-18 PROCEDURE — 770001 HCHG ROOM/CARE - MED/SURG/GYN PRIV*

## 2023-07-18 PROCEDURE — 99231 SBSQ HOSP IP/OBS SF/LOW 25: CPT | Performed by: PSYCHIATRY & NEUROLOGY

## 2023-07-18 PROCEDURE — A9270 NON-COVERED ITEM OR SERVICE: HCPCS | Performed by: INTERNAL MEDICINE

## 2023-07-18 RX ORDER — MIDODRINE HYDROCHLORIDE 5 MG/1
5 TABLET ORAL 3 TIMES DAILY
Status: DISCONTINUED | OUTPATIENT
Start: 2023-07-18 | End: 2023-07-19

## 2023-07-18 RX ADMIN — SENNOSIDES AND DOCUSATE SODIUM 2 TABLET: 50; 8.6 TABLET ORAL at 06:08

## 2023-07-18 RX ADMIN — ASPIRIN 81 MG 81 MG: 81 TABLET ORAL at 16:58

## 2023-07-18 RX ADMIN — ENOXAPARIN SODIUM 40 MG: 100 INJECTION SUBCUTANEOUS at 16:58

## 2023-07-18 RX ADMIN — SENNOSIDES AND DOCUSATE SODIUM 2 TABLET: 50; 8.6 TABLET ORAL at 16:58

## 2023-07-18 RX ADMIN — ATORVASTATIN CALCIUM 80 MG: 80 TABLET, FILM COATED ORAL at 16:59

## 2023-07-18 ASSESSMENT — ENCOUNTER SYMPTOMS
HEADACHES: 0
PALPITATIONS: 0
FOCAL WEAKNESS: 1
SENSORY CHANGE: 1
SHORTNESS OF BREATH: 0
BLURRED VISION: 0
LOSS OF CONSCIOUSNESS: 0
COUGH: 0
FEVER: 0
SORE THROAT: 0
NAUSEA: 0
DIARRHEA: 0
VOMITING: 0
BACK PAIN: 0
DIZZINESS: 0
ABDOMINAL PAIN: 0
DOUBLE VISION: 0
CHILLS: 0

## 2023-07-18 ASSESSMENT — COGNITIVE AND FUNCTIONAL STATUS - GENERAL
TURNING FROM BACK TO SIDE WHILE IN FLAT BAD: UNABLE
WALKING IN HOSPITAL ROOM: TOTAL
MOBILITY SCORE: 7
CLIMB 3 TO 5 STEPS WITH RAILING: TOTAL
MOVING TO AND FROM BED TO CHAIR: UNABLE
STANDING UP FROM CHAIR USING ARMS: A LOT
MOVING FROM LYING ON BACK TO SITTING ON SIDE OF FLAT BED: UNABLE
SUGGESTED CMS G CODE MODIFIER MOBILITY: CM

## 2023-07-18 ASSESSMENT — GAIT ASSESSMENTS: GAIT LEVEL OF ASSIST: UNABLE TO PARTICIPATE

## 2023-07-18 NOTE — PROGRESS NOTES
4 Eyes Skin Assessment Completed by ERICKSON Saab and ERICKSON Talbert.    Head WDL  Ears WDL  Nose WDL IRIS to right nare  Mouth WDL  Neck WDL  Breast/Chest WDL  Shoulder Blades WDL  Spine WDL  (R) Arm/Elbow/Hand WDL  (L) Arm/Elbow/Hand WDL  Abdomen WDL  Groin Dressing to Right groin site CDI  Scrotum/Coccyx/Buttocks Redness and Blanching  (R) Leg WDL  (L) Leg WDL  (R) Heel/Foot/Toe Redness and Blanching  (L) Heel/Foot/Toe Redness and Blanching          Devices In Places IRIS and condom cath      Interventions In Place Q2 Turns Will order waffle mattress    Possible Skin Injury No    Pictures Uploaded Into Epic N/A  Wound Consult Placed N/A  RN Wound Prevention Protocol Ordered Yes

## 2023-07-18 NOTE — CARE PLAN
The patient is Stable - Low risk of patient condition declining or worsening    Shift Goals  Clinical Goals: Stable neuro status, Q2 turns  Patient Goals: Sleep  Family Goals: BRENDAN    Progress made toward(s) clinical / shift goals:    Problem: Neuro Status  Goal: Neuro status will remain stable or improve  Outcome: Progressing     Problem: Knowledge Deficit - Standard  Goal: Patient and family/care givers will demonstrate understanding of plan of care, disease process/condition, diagnostic tests and medications  Outcome: Progressing     Problem: Pain - Standard  Goal: Alleviation of pain or a reduction in pain to the patient’s comfort goal  Outcome: Progressing     Problem: Fall Risk  Goal: Patient will remain free from falls  Outcome: Progressing     Problem: Skin Integrity  Goal: Skin integrity is maintained or improved  Outcome: Progressing       Patient is not progressing towards the following goals:
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
## 2023-07-18 NOTE — THERAPY
Physical Therapy   Initial Evaluation     Patient Name: Niyah Collins  Age:  77 y.o., Sex:  male  Medical Record #: [id number]  Today's Date: 7/18/2023     Precautions  Precautions: Fall Risk;Swallow Precautions;Nasogastric Tube  Comments: L gaze preference, dysarthria, R hemiplegia    Assessment  Niyah Collins is a 77 year old man hypertension, diabetes, presenting on 7/13 with L MCA syndrome, found to have L M1 occlusion and critical L intracranial ICA stenosis.  He is now s/p IV-TNK and TICI 2c mechanical thrombectomy.  MRI brain with modest stroke burden in L MCA territory.    Upon evaluation, pt presents with R facial droop, dysarthria, L gaze preference (able to cross midline ~30 deg to the right), R inattention, R hemiplegia (minimal volitional movement R hip 1x, unable to reproduce), with severe RUE flexor/RLE extensor synergy spasticity. Pt also demonstrates impaired cognition (Aox2), and impaired balance (presence of contraversive pushing). Pt currently requires modA for bed mobility, unsupported trunk control, and for stand pivot transfers to recliner. Recommend continued PT to progress functional mobility,and believe pt would benefit from post-acute rehab upon D/C.     Plan    Physical Therapy Initial Treatment Plan   Treatment Plan : Bed Mobility, Gait Training, Neuro Re-Education / Balance, Self Care / Home Evaluation, Therapeutic Activities  Treatment Frequency: 4 Times per Week  Duration: Until Therapy Goals Met    DC Equipment Recommendations: Unable to determine at this time  Discharge Recommendations: Recommend post-acute placement for additional physical therapy services prior to discharge home       Subjective    Pt agreeable to session, and thought it was night time.      Objective       07/18/23 0844   Initial Contact Note    Initial Contact Note Order Received and Verified, Physical Therapy Evaluation in Progress with Full Report to Follow.   Precautions   Precautions Fall Risk;Swallow  Precautions;Nasogastric Tube   Comments L gaze preference, dysarthria, R hemiplegia   Vitals   Blood Pressure  (!) 147/100   O2 Delivery Device None - Room Air   Pain   Pain Scales 0 to 10 Scale    Pain 0 - 10 Group   Pain Rating Scale (NPRS) 0   Prior Living Situation   Prior Services Unable To Determine At This Time   Housing / Facility Unable To Determine At This Time   Lives with - Patient's Self Care Capacity Significant Other   Comments Pt unable to provide PLOF this date. Per chart, social concerns present with prior living environment.   Prior Level of Functional Mobility   Bed Mobility Unable To Determine At This Time   Transfer Status Unable To Determine At This Time   Ambulation Unable To Determine At This Time   Cognition    Cognition / Consciousness X   Speech/ Communication Dysarthric   Orientation Level Other (Comment)  (Oriented to self, required prompts to orient to location. Disoriented to situation and month/year despite prompts.)   Level of Consciousness Responds to voice   Ability To Follow Commands 2 Step   Safety Awareness Impaired   New Learning Impaired   Attention Impaired   Comments L gaze preference/R inattention present. Visuospatial deficits noted extraspatial >intrapersonal. Disoriented to time of day.   Passive ROM Upper Body   Passive ROM Upper Body X   Comments Able to achieve 90 deg R shoulder flexion, limited by severe spasticity   Strength Upper Body   Upper Body Strength  X   Comments LUE grossly 4+/5. RUE grossly 0/5, except questionable trace bicep (likely tone activation)   Sensation Upper Body   Upper Extremity Sensation  X   Comments Unable to accurately assess, likely impaired sensation RUE, but negative presence of sensory extinction. Unable to initially locate R thumb with L hand when prompted, but later able to locate R hand w/L hand   Upper Body Muscle Tone   Upper Body Muscle Tone  X   Rt Upper Extremity Muscle Tone Spastic  (R shoulder extension 3 MAS, R shoulder IR  3 MAS, R elbow flexors 2 MAS)   Passive ROM Lower Body   Passive ROM Lower Body WDL   Strength Lower Body   Lower Body Strength  X   Comments LLE grossly 4+/5. RLE grossly 0/5, except 2-/5 R hip flexion (minimal movement 1x)   Sensation Lower Body   Lower Extremity Sensation   X   Comments Unable to accurately assess; however, poor ability to identify sensation RLE   Lower Body Muscle Tone   Lower Body Muscle Tone  X   Modified Aniket Scale Right Lower Extremity 3  (extensor synergy)   Neurological Concerns   Comments R facial droop present. L gaze preference, able to cross midline and scan ~30 deg to the right. Increased time to perform w/significant difficulties locating/identifying objects in environment   Balance Assessment   Sitting Balance (Static) Trace +   Standing Balance (Static) Trace +   Comments Poor postural control initially while sitting EOB w/delayed reactive balance strategies. Multi-directional LOB and impacted by contraversive pushing with LUE. Provided feedback of midline positioning, and removal of LUE for support/pushing. Facilitated upright posture, and scanning of environment. Fair ability to cross midline to the right with increased time, but difficulties sustaining attention. Constant eye closure noted w/max cuing to increase alertness and re-orient to time of day (pt thought it was night time). Facilitated awareness of R morteza-body this date. Min-modA for sitting balance noted, and modA for standing balance.   Bed Mobility    Supine to Sit Moderate Assist  (Fair initiation noted this date w/HOB slightly elevated and use of side rail. Assist to clear RLE off the bed and elevate trunk. Min-modA for safety/trunk control while pt intiated repositioning hips EOB)   Gait Analysis   Gait Level Of Assist Unable to Participate   Functional Mobility   Sit to Stand Moderate Assist   Bed, Chair, Wheelchair Transfer Moderate Assist  (bed>recliner)   Transfer Method Stand Pivot   How much difficulty  does the patient currently have...   Turning over in bed (including adjusting bedclothes, sheets and blankets)? 1   Sitting down on and standing up from a chair with arms (e.g., wheelchair, bedside commode, etc.) 1   Moving from lying on back to sitting on the side of the bed? 1   How much help from another person does the patient currently need...   Moving to and from a bed to a chair (including a wheelchair)? 2   Need to walk in a hospital room? 1   Climbing 3-5 steps with a railing? 1   6 clicks Mobility Score 7   Activity Tolerance   Sitting Edge of Bed 10 minutes   Short Term Goals    Short Term Goal # 1 Pt will transfer supine<>sit with Kayli in 6 visits to improve bed mobility.   Short Term Goal # 2 Pt will maintain midline unsupported position SBA >2 minutes while EOB to improve trunk control   Short Term Goal # 3 Pt will transfer stand step bed<>chair/wc CGA w/LRAD in 6 visits to improve transfer abilities.   Short Term Goal # 4 Pt will ambulate 5' maxA w/LRAD in 6 visits to improve standing and walking abilities.   Education Group   Education Provided Role of Physical Therapist   Role of Physical Therapist Patient Response Verbal Demonstration   Physical Therapy Initial Treatment Plan    Treatment Plan  Bed Mobility;Gait Training;Neuro Re-Education / Balance;Self Care / Home Evaluation;Therapeutic Activities   Treatment Frequency 4 Times per Week   Duration Until Therapy Goals Met   Problem List    Problems Impaired Bed Mobility;Impaired Transfers;Impaired Ambulation;Functional ROM Deficit;Impaired Balance;Functional Strength Deficit;Decreased Activity Tolerance;Safety Awareness Deficits / Cognition;Motor Planning / Sequencing   Anticipated Discharge Equipment and Recommendations   DC Equipment Recommendations Unable to determine at this time   Discharge Recommendations Recommend post-acute placement for additional physical therapy services prior to discharge home   Interdisciplinary Plan of Care  Collaboration   IDT Collaboration with  Nursing   Patient Position at End of Therapy Seated;Chair Alarm On;Call Light within Reach;Tray Table within Reach   Collaboration Comments RN aware of performance   Session Information   Date / Session Number  7/18- 1(1/4, 7/24)

## 2023-07-18 NOTE — PROGRESS NOTES
Neurology Progress Note  Neurohospitalist Service, [facility]iences    Referring Physician: Sanket Perez, *      Interval History: No acute events overnight.  Transferred to Neuroscience.  Blood pressure at goal.    Review of systems: In addition to what is detailed in the HPI and/or updated in the interval history, all other systems reviewed and are negative.    Past Medical History, Past Surgical History and Social History reviewed and unchanged from prior    Medications:    Current Facility-Administered Medications:     midodrine (Proamatine) tablet 5 mg, 5 mg, Enteral Tube, TID, Sanket Perez D.O.    atorvastatin (Lipitor) tablet 80 mg, 80 mg, Enteral Tube, Q EVENING, Romel Gutierrez M.D., 80 mg at 07/17/23 1758    HYDROmorphone (Dilaudid) injection 0.25 mg, 0.25 mg, Intravenous, Q4HRS PRN, Linda Calderon M.D., 0.25 mg at 07/15/23 0910    Pharmacy Consult: Enteral tube insertion - review meds/change route/product selection, 1 Each, Other, PHARMACY TO DOSE, Linda Calderon M.D.    ondansetron (Zofran ODT) dispertab 4 mg, 4 mg, Enteral Tube, Q4HRS PRN, Linda Calderon M.D.    senna-docusate (Pericolace Or Senokot S) 8.6-50 MG per tablet 2 Tablet, 2 Tablet, Enteral Tube, BID, 2 Tablet at 07/18/23 0608 **AND** polyethylene glycol/lytes (Miralax) PACKET 1 Packet, 1 Packet, Enteral Tube, QDAY PRN **AND** magnesium hydroxide (Milk Of Magnesia) suspension 30 mL, 30 mL, Enteral Tube, QDAY PRN **AND** bisacodyl (Dulcolax) suppository 10 mg, 10 mg, Rectal, QDAY PRN, Linda Calderon M.D.    aspirin (Asa) chewable tab 81 mg, 81 mg, Enteral Tube, DAILY, 81 mg at 07/17/23 1800 **OR** aspirin (Asa) suppository 300 mg, 300 mg, Rectal, DAILY, Linda Calderon M.D.    ondansetron (Zofran) syringe/vial injection 4 mg, 4 mg, Intravenous, Q4HRS PRN, Rosemary Kirkpatrick M.D.    hydrALAZINE (Apresoline) injection 20 mg, 20 mg, Intravenous, Q2HRS PRN, Rosemary Kirkpatrick M.D.    labetalol  "(Normodyne/Trandate) injection 10-20 mg, 10-20 mg, Intravenous, Q10 MIN PRN, Rosemary Kirkpatrick M.D.    enoxaparin (Lovenox) inj 40 mg, 40 mg, Subcutaneous, DAILY AT 1800, Romel Rosario M.D., 40 mg at 07/17/23 1758    Physical Examination:   BP (!) 147/100   Pulse 82   Temp 36.6 °C (97.9 °F) (Temporal)   Resp 18   Ht 1.956 m (6' 5\")   Wt 77.3 kg (170 lb 6.7 oz)   SpO2 93%   BMI 20.21 kg/m²       General: Patient is lethargic, easily arousable  Neck: There is normal range of motion  CV: Tachycardic  Extremities:  Warm, dry, and intact, without peripheral lower extremity edema    NEUROLOGICAL EXAM:     Mental status: Lethargic, arousable  Speech and language: Speech is dysarthric, speech paucity, but fluent. Able to state name, oriented to place and daughter's name.  Cranial nerve exam: L gaze preference, however crosses midline, R face droop  Motor exam: There is sustained antigravity with no downward drift in L arm and leg.  No movement in R arm and R leg.    Sensory exam:  Reacts to tactile in all 4 extremities, there does appear to be R side neglect  Coordination: No ataxia on elicited L side movements    Objective Data:    Labs:  Lab Results   Component Value Date/Time    PROTHROMBTM 14.2 07/13/2023 10:55 PM    INR 1.11 07/13/2023 10:55 PM      Lab Results   Component Value Date/Time    WBC 9.5 07/18/2023 03:04 AM    RBC 5.59 07/18/2023 03:04 AM    HEMOGLOBIN 16.7 07/18/2023 03:04 AM    HEMATOCRIT 46.7 07/18/2023 03:04 AM    MCV 83.5 07/18/2023 03:04 AM    MCH 29.9 07/18/2023 03:04 AM    MCHC 35.8 07/18/2023 03:04 AM    MPV 9.9 07/18/2023 03:04 AM    NEUTSPOLYS 83.30 (H) 07/18/2023 03:04 AM    LYMPHOCYTES 6.70 (L) 07/18/2023 03:04 AM    MONOCYTES 9.00 07/18/2023 03:04 AM    EOSINOPHILS 0.30 07/18/2023 03:04 AM    BASOPHILS 0.30 07/18/2023 03:04 AM      Lab Results   Component Value Date/Time    SODIUM 133 (L) 07/18/2023 03:04 AM    POTASSIUM 3.8 07/18/2023 03:04 AM    CHLORIDE 98 07/18/2023 03:04 AM    " CO2 22 07/18/2023 03:04 AM    GLUCOSE 133 (H) 07/18/2023 03:04 AM    BUN 35 (H) 07/18/2023 03:04 AM    CREATININE 0.90 07/18/2023 03:04 AM      Lab Results   Component Value Date/Time    CHOLSTRLTOT 161 07/14/2023 04:05 AM     (H) 07/14/2023 04:05 AM    HDL 44 07/14/2023 04:05 AM    TRIGLYCERIDE 67 07/14/2023 04:05 AM       Lab Results   Component Value Date/Time    ALKPHOSPHAT 73 07/13/2023 10:55 PM    ASTSGOT 26 07/13/2023 10:55 PM    ALTSGPT 18 07/13/2023 10:55 PM    TBILIRUBIN 0.4 07/13/2023 10:55 PM        Imaging/Testing:    I interpreted and/or reviewed the patient's neuroimaging    DX-ABDOMEN FOR TUBE PLACEMENT   Final Result      NGT appears adequate, with its tip in the distal stomach      DX-ABDOMEN FOR TUBE PLACEMENT   Final Result      Enteric tube tip is in the distal esophagus, just above the level of the GE junction. Recommend advancement of the tube approximately 15 cm.      DX-ABDOMEN FOR TUBE PLACEMENT   Final Result      Feeding tube tip projects in the gastric fundus.      CT-HEAD W/O   Final Result      1.  Evolving infarcts in the left basal ganglia and in the left frontotemporal and temporoparietal regions, in the left MCA distribution. Minimal hemorrhage in the basal ganglia infarct.   2.  No midline shift or herniation.         MR-BRAIN-W/O   Final Result      1.  Moderate-sized acute infarct involving the left MCA territory. Limited evaluation demonstrates no definite evidence of hemorrhagic transformation.   2.  Mild diffuse cerebral substance loss.   3.  Mild microangiopathic ischemic change versus demyelination or gliosis.      EC-ECHOCARDIOGRAM COMPLETE W/O CONT   Final Result      CT-HEAD W/O   Final Result      1. No CT evidence of acute infarct, hemorrhage or mass. No loss of gray-white matter differentiation.   2. No evidence of MCA reocclusion on this study.   3. Mild global parenchymal atrophy. Chronic small vessel ischemic changes.      IR-THROMBO MECHANICAL ARTERY,INIT    Final Result         77-year-old patient who presented with left MCA occlusion and left ICA high-grade stenosis underwent emergent cerebral angiogram which showed free-floating thrombus in the distal left ICA and occlusion of the inferior division MCA. Mechanical    thrombectomy was performed with complete restoration of flow to the left MCA inferior division and resolution of the free floating thrombus in the distal ICA.      Final recanalization score: TICI 2C      I, Earnestine Fox was physically present and participated during the entire procedure of the IR-THROMBO MECHANICAL ARTERY,INIT.                  DX-CHEST-PORTABLE (1 VIEW)   Final Result      No acute cardiopulmonary abnormality.         CT-CTA NECK WITH & W/O-POST PROCESSING   Final Result      1.  Sluggish flow in the distal left internal carotid artery, with occlusion of the carotid terminus.   2.  No high-grade stenosis or occlusion of the proximal left internal carotid artery.   3.  Patent right carotid and vertebral arteries, without occlusion, aneurysm or high-grade stenosis.      CT-CTA HEAD WITH & W/O-POST PROCESS   Final Result      Acute occlusion the distal left internal carotid artery and left M1 segment.      CT-CEREBRAL PERFUSION ANALYSIS   Final Result      1.  Cerebral blood flow less than 30% likely representing completed infarct = 14 mL.      2.  T Max more than 6 seconds likely representing combination of completed infarct and ischemia = 194 mL.      3.  Mismatched volume likely representing ischemic brain/penumbra = 180 mL.      4.  Please note that the cerebral perfusion was performed on the limited brain tissue around the basal ganglia region. Infarct/ischemia outside the CT perfusion sections can be missed in this study.      CT-HEAD W/O   Final Result   Addendum (preliminary) 1 of 1   Findings were communicated to Dr. Fox via [employer] system on    7/13/2023 11:17 PM.      Final      1.  Hyperdense left MCA,  consistent with left MCA occlusion.   2.  No loss of gray-white matter differentiation at this time.   3.  Mild atrophy and chronic small vessel ischemic changes.             Assessment and Plan:  Niyah Collins is a 77 year old man hypertension, diabetes, presenting on 7/13 with L MCA syndrome, found to have L M1 occlusion and critical L intracranial ICA stenosis.  He is now s/p IV-TNK and TICI 2c mechanical thrombectomy.  MRI brain with modest stroke burden in L MCA territory that precludes expedited intracranial stent placement and DAPT due to high risk for catastrophic reperfusion injury.  Recommend mild acute hypertensive response to ensure adequate flow through his flow limiting stenosis.  Eventual DAPT, and consideration of intracranial stent placement if significant recovery seen in upcoming months (as currently with relatively dense L MCA syndrome).  He is ambixdetrous, and we suspect language center is R hemisphere given relatively intact language function.  No new recommendations today.    Problem list:   L MCA stroke   Critical L ICA stenosis   Hypertension  Type 2 diabetes    Plan:   - q4h neurochecks/NIHSS   - SBP goal 120-160 STRICT, avoid hypotension, on midodrine- primary service titrating to BP goal   - continue ASA 81mg daily   - start plavix for 90 day course in 2 weeks- start date is 7/27 ( will need 6 month of DAPT if stent is eventually placed)   - stroke labs:  HgbA1c 5.1,    - continue atorvastatin 80mg daily for goal LDL < 70   - DM management for goal HgbA1c < 7   - although likely atheroembolic disease- complete embolic workup.  TTE without obvious cardioembolic nidus, ziopatch at discharge   - PT/OT/SLP, will likely need PEG placement   - lovenox SQ for DVT chemoppx   - stroke bridge clinic follow up to evaluate candidacy for future stent placement   - please reconsult Stroke Neurology with any additional questions or concerns.       The evaluation of the patient, and  recommended management, was discussed with the attending hospitalist, Dr. Valle. I have performed a physical exam and reviewed and updated ROS and Plan today (7/18/2023).     Romel Gutierrez MD  Neurohospitalist, Acute Care Services

## 2023-07-19 PROBLEM — R13.12 OROPHARYNGEAL DYSPHAGIA: Status: ACTIVE | Noted: 2023-07-19

## 2023-07-19 LAB
ANION GAP SERPL CALC-SCNC: 11 MMOL/L (ref 7–16)
BUN SERPL-MCNC: 41 MG/DL (ref 8–22)
CALCIUM SERPL-MCNC: 10 MG/DL (ref 8.5–10.5)
CHLORIDE SERPL-SCNC: 98 MMOL/L (ref 96–112)
CO2 SERPL-SCNC: 28 MMOL/L (ref 20–33)
CREAT SERPL-MCNC: 0.93 MG/DL (ref 0.5–1.4)
GFR SERPLBLD CREATININE-BSD FMLA CKD-EPI: 85 ML/MIN/1.73 M 2
GLUCOSE SERPL-MCNC: 150 MG/DL (ref 65–99)
POTASSIUM SERPL-SCNC: 4.3 MMOL/L (ref 3.6–5.5)
SODIUM SERPL-SCNC: 137 MMOL/L (ref 135–145)

## 2023-07-19 PROCEDURE — 99232 SBSQ HOSP IP/OBS MODERATE 35: CPT | Performed by: GENERAL PRACTICE

## 2023-07-19 PROCEDURE — 770006 HCHG ROOM/CARE - MED/SURG/GYN SEMI*

## 2023-07-19 PROCEDURE — 700102 HCHG RX REV CODE 250 W/ 637 OVERRIDE(OP): Performed by: PSYCHIATRY & NEUROLOGY

## 2023-07-19 PROCEDURE — 80048 BASIC METABOLIC PNL TOTAL CA: CPT

## 2023-07-19 PROCEDURE — 700102 HCHG RX REV CODE 250 W/ 637 OVERRIDE(OP): Performed by: INTERNAL MEDICINE

## 2023-07-19 PROCEDURE — 36415 COLL VENOUS BLD VENIPUNCTURE: CPT

## 2023-07-19 PROCEDURE — 92526 ORAL FUNCTION THERAPY: CPT

## 2023-07-19 PROCEDURE — A9270 NON-COVERED ITEM OR SERVICE: HCPCS | Performed by: INTERNAL MEDICINE

## 2023-07-19 PROCEDURE — A9270 NON-COVERED ITEM OR SERVICE: HCPCS | Performed by: PSYCHIATRY & NEUROLOGY

## 2023-07-19 PROCEDURE — 700111 HCHG RX REV CODE 636 W/ 250 OVERRIDE (IP): Mod: JZ | Performed by: PSYCHIATRY & NEUROLOGY

## 2023-07-19 RX ORDER — BISACODYL 10 MG
10 SUPPOSITORY, RECTAL RECTAL
Status: DISCONTINUED | OUTPATIENT
Start: 2023-07-19 | End: 2023-07-27 | Stop reason: HOSPADM

## 2023-07-19 RX ORDER — AMOXICILLIN 250 MG
2 CAPSULE ORAL 2 TIMES DAILY PRN
Status: DISCONTINUED | OUTPATIENT
Start: 2023-07-19 | End: 2023-07-27 | Stop reason: HOSPADM

## 2023-07-19 RX ORDER — POLYETHYLENE GLYCOL 3350 17 G/17G
1 POWDER, FOR SOLUTION ORAL
Status: DISCONTINUED | OUTPATIENT
Start: 2023-07-19 | End: 2023-07-27 | Stop reason: HOSPADM

## 2023-07-19 RX ORDER — MIDODRINE HYDROCHLORIDE 5 MG/1
5 TABLET ORAL 3 TIMES DAILY PRN
Status: DISCONTINUED | OUTPATIENT
Start: 2023-07-19 | End: 2023-07-20

## 2023-07-19 RX ADMIN — ENOXAPARIN SODIUM 40 MG: 100 INJECTION SUBCUTANEOUS at 16:56

## 2023-07-19 RX ADMIN — ASPIRIN 81 MG 81 MG: 81 TABLET ORAL at 16:56

## 2023-07-19 RX ADMIN — SENNOSIDES AND DOCUSATE SODIUM 2 TABLET: 50; 8.6 TABLET ORAL at 04:23

## 2023-07-19 RX ADMIN — ATORVASTATIN CALCIUM 80 MG: 80 TABLET, FILM COATED ORAL at 16:56

## 2023-07-19 ASSESSMENT — ENCOUNTER SYMPTOMS
DIZZINESS: 0
VOMITING: 0
NAUSEA: 0
SORE THROAT: 0
PALPITATIONS: 0
HEADACHES: 0
SHORTNESS OF BREATH: 0
DIARRHEA: 0
BACK PAIN: 0
SENSORY CHANGE: 1
CHILLS: 0
FOCAL WEAKNESS: 1
ABDOMINAL PAIN: 0
BLURRED VISION: 0
DOUBLE VISION: 0
COUGH: 0
LOSS OF CONSCIOUSNESS: 0
FEVER: 0

## 2023-07-19 NOTE — CARE PLAN
The patient is Stable - Low risk of patient condition declining or worsening    Shift Goals  Clinical Goals: SBP < 160 but >120, increase mobility, see SLP  Patient Goals: Go home  Family Goals: Increase mobility    Progress made toward(s) clinical / shift goals:    SBP remains stable between parameters.   Pt mobilized to chair   SLP worked with patient and is still recommending peg tube.        Problem: Risk for Aspiration  Goal: Patient's risk for aspiration will be absent or decrease  7/19/2023 1633 by Linda Vidales, R.N.  Outcome: Not Met  Pt still at high risk for aspiration due to stroke. SLP working with patient and recommending PEG.     Problem: Knowledge Deficit - Stroke Education  Goal: Patient's knowledge of stroke and risk factors will improve  Outcome: Progressing     Problem: Neuro Status  Goal: Neuro status will remain stable or improve  Outcome: Progressing     Problem: Mobility - Stroke  Goal: Patient's capacity to carry out activities will improve  7/19/2023 1633 by Linda Vidales, R.N.  Outcome: Progressing

## 2023-07-19 NOTE — THERAPY
Speech Language Pathology   Daily Treatment     Patient Name: Niyah Collins  AGE:  77 y.o., SEX:  male  Medical Record #: [id number]  Date of Service: 7/19/2023      Precautions:  Precautions: Fall Risk, Swallow Precautions, Nasogastric Tube         Subjective  Patient was sleeping on arrival and lethargic throughout the session.     Assessment  Oral care provided prior to administration of PO trials of ice chips, mildly thick liquids via tsp x2. Thick yellowish white secretions removed from hard/soft palate area. Pt did not allow SLP to remove dentures for cleaning despite education. Suspect impaired pharyngeal secretion management due to wet/gurgly vocal quality and wet cough with pt rarely able to expectorate phlegm/secretions. Given single ice chips, pt triggered a delayed swallow response ~75% of the time with increased wet/audible secretions and coughing. He consumed 1/2-full tsp of mildly thick liquid across two trials with increased wet/gurgly vocal quality, throat clearing and attempts to expectorate. Pt was encouraged to perform effortful swallows with PO trials, though unclear how well he executed. Pt declined further PO intake due to difficulty swallowing; SLP in agreement. Instructed pt to perform lingual press exercises, which he completed x3 before declining to participate further due to fatigue.     Clinical Impressions  Patient presents with worsening dysphagia symptoms since FEES 7/15/23, including difficulty managing secretions, difficulty triggering a swallow with ice chips and s/sx of aspiration with all PO trials. Patient is also presenting with limited participation in behavioral swallow therapy due to poor endurance/lethargy.  A PEG tube is indicated due to poor progress/limited participation to date with anticipated prolonged recovery for swallow function.     Recommendations  Treatment Completed: Dysphagia Treatment  Dysphagia Treatment  Diet Consistency: NPO/NGT; consider PEG  "placement  Instrumentation: Instrumental swallow study pending clinical progress  Medication: Non Oral  Oral Care: Q2h    SLP Treatment Plan  Treatment Plan: Dysphagia Treatment  SLP Frequency: 5x Per Week  Estimated Duration: Until Therapy Goals Met    Anticipated Discharge Needs  Discharge Recommendations: Recommend post-acute placement for additional speech therapy services prior to discharge home  Therapy Recommendations Upon DC: Dysphagia Training, Patient / Family / Caregiver Education    Patient / Family Goals  Patient / Family Goal #1: \"I care. I care.\" - patient  Goal #1 Outcome: Progressing as expected  Short Term Goals  Short Term Goal # 1: Patient will consume PO trials with SLP only with no overt s/sx of aspiration.  Goal Outcome # 1: Goal not met  Short Term Goal # 2: Patient will participate in an instrumental swallow study to determine swallow physiology and inform POC.  Short Term Goal # 2 B : Patient will participate in dysphagia exercises with \"good\" accuracy as judged by SLP in 3/4 attempts with moderate verbal/visual cues. (Progressing slower than expected)      Araceli Durant MS,CCC-SLP  "

## 2023-07-19 NOTE — CARE PLAN
The patient is Stable - Low risk of patient condition declining or worsening    Shift Goals  Clinical Goals: sbp <160, q2 turns, up to chair, stable neuro status  Patient Goals: go home  Family Goals: Increase mobility    Progress made toward(s) clinical / shift goals:    SBP stable  Q2 turns in place   Neuro stable     Problem: Discharge Planning - Stroke  Goal: Ensure Stroke Core Measures are met prior to discharge  Outcome: Progressing     Problem: Neuro Status  Goal: Neuro status will remain stable or improve  Outcome: Progressing     Problem: Mobility - Stroke  Goal: Patient's capacity to carry out activities will improve  Outcome: Progressing     Problem: Self Care  Goal: Patient will have the ability to perform ADLs independently or with assistance (bathe, groom, dress, toilet and feed)  Outcome: Progressing

## 2023-07-20 ENCOUNTER — APPOINTMENT (OUTPATIENT)
Dept: RADIOLOGY | Facility: MEDICAL CENTER | Age: 78
DRG: 023 | End: 2023-07-20
Attending: NURSE PRACTITIONER
Payer: MEDICARE

## 2023-07-20 PROBLEM — Z71.89 ADVANCE CARE PLANNING: Status: ACTIVE | Noted: 2023-07-20

## 2023-07-20 LAB
ANION GAP SERPL CALC-SCNC: 13 MMOL/L (ref 7–16)
BUN SERPL-MCNC: 50 MG/DL (ref 8–22)
CALCIUM SERPL-MCNC: 10 MG/DL (ref 8.5–10.5)
CHLORIDE SERPL-SCNC: 102 MMOL/L (ref 96–112)
CO2 SERPL-SCNC: 25 MMOL/L (ref 20–33)
CREAT SERPL-MCNC: 0.85 MG/DL (ref 0.5–1.4)
GFR SERPLBLD CREATININE-BSD FMLA CKD-EPI: 90 ML/MIN/1.73 M 2
GLUCOSE SERPL-MCNC: 142 MG/DL (ref 65–99)
POTASSIUM SERPL-SCNC: 4.1 MMOL/L (ref 3.6–5.5)
SODIUM SERPL-SCNC: 140 MMOL/L (ref 135–145)

## 2023-07-20 PROCEDURE — 700102 HCHG RX REV CODE 250 W/ 637 OVERRIDE(OP): Performed by: GENERAL PRACTICE

## 2023-07-20 PROCEDURE — 770001 HCHG ROOM/CARE - MED/SURG/GYN PRIV*

## 2023-07-20 PROCEDURE — 700102 HCHG RX REV CODE 250 W/ 637 OVERRIDE(OP): Performed by: INTERNAL MEDICINE

## 2023-07-20 PROCEDURE — A9270 NON-COVERED ITEM OR SERVICE: HCPCS | Performed by: STUDENT IN AN ORGANIZED HEALTH CARE EDUCATION/TRAINING PROGRAM

## 2023-07-20 PROCEDURE — 99222 1ST HOSP IP/OBS MODERATE 55: CPT | Performed by: NURSE PRACTITIONER

## 2023-07-20 PROCEDURE — A9270 NON-COVERED ITEM OR SERVICE: HCPCS | Performed by: PSYCHIATRY & NEUROLOGY

## 2023-07-20 PROCEDURE — A9270 NON-COVERED ITEM OR SERVICE: HCPCS | Performed by: INTERNAL MEDICINE

## 2023-07-20 PROCEDURE — 97530 THERAPEUTIC ACTIVITIES: CPT

## 2023-07-20 PROCEDURE — 97535 SELF CARE MNGMENT TRAINING: CPT

## 2023-07-20 PROCEDURE — 700102 HCHG RX REV CODE 250 W/ 637 OVERRIDE(OP): Performed by: STUDENT IN AN ORGANIZED HEALTH CARE EDUCATION/TRAINING PROGRAM

## 2023-07-20 PROCEDURE — A9270 NON-COVERED ITEM OR SERVICE: HCPCS | Performed by: GENERAL PRACTICE

## 2023-07-20 PROCEDURE — 99233 SBSQ HOSP IP/OBS HIGH 50: CPT | Mod: 24 | Performed by: STUDENT IN AN ORGANIZED HEALTH CARE EDUCATION/TRAINING PROGRAM

## 2023-07-20 PROCEDURE — 36415 COLL VENOUS BLD VENIPUNCTURE: CPT

## 2023-07-20 PROCEDURE — 74018 RADEX ABDOMEN 1 VIEW: CPT

## 2023-07-20 PROCEDURE — 80048 BASIC METABOLIC PNL TOTAL CA: CPT

## 2023-07-20 PROCEDURE — 99497 ADVNCD CARE PLAN 30 MIN: CPT | Performed by: STUDENT IN AN ORGANIZED HEALTH CARE EDUCATION/TRAINING PROGRAM

## 2023-07-20 PROCEDURE — 700102 HCHG RX REV CODE 250 W/ 637 OVERRIDE(OP): Performed by: PSYCHIATRY & NEUROLOGY

## 2023-07-20 RX ORDER — CHLORPROMAZINE HYDROCHLORIDE 25 MG/1
25 TABLET, FILM COATED ORAL 3 TIMES DAILY PRN
Status: DISCONTINUED | OUTPATIENT
Start: 2023-07-20 | End: 2023-07-27 | Stop reason: HOSPADM

## 2023-07-20 RX ORDER — MIDODRINE HYDROCHLORIDE 5 MG/1
5 TABLET ORAL
Status: DISCONTINUED | OUTPATIENT
Start: 2023-07-20 | End: 2023-07-27

## 2023-07-20 RX ADMIN — CHLORPROMAZINE HYDROCHLORIDE 25 MG: 25 TABLET, FILM COATED ORAL at 21:26

## 2023-07-20 RX ADMIN — ATORVASTATIN CALCIUM 80 MG: 80 TABLET, FILM COATED ORAL at 17:29

## 2023-07-20 RX ADMIN — ASPIRIN 81 MG 81 MG: 81 TABLET ORAL at 17:29

## 2023-07-20 RX ADMIN — POLYETHYLENE GLYCOL 3350 1 PACKET: 17 POWDER, FOR SOLUTION ORAL at 16:00

## 2023-07-20 RX ADMIN — MIDODRINE HYDROCHLORIDE 5 MG: 5 TABLET ORAL at 17:29

## 2023-07-20 ASSESSMENT — ENCOUNTER SYMPTOMS
BLURRED VISION: 0
CONSTIPATION: 0
FEVER: 0
CHILLS: 0
FOCAL WEAKNESS: 1
WEAKNESS: 0
SHORTNESS OF BREATH: 0
HEARTBURN: 0
NAUSEA: 0
PALPITATIONS: 0
COUGH: 0
LOSS OF CONSCIOUSNESS: 0
DIZZINESS: 0
VOMITING: 0
DIARRHEA: 0
BLOOD IN STOOL: 0
BACK PAIN: 0
DEPRESSION: 0
SENSORY CHANGE: 1
DOUBLE VISION: 0
SORE THROAT: 0
HEADACHES: 0
ABDOMINAL PAIN: 0

## 2023-07-20 ASSESSMENT — PAIN DESCRIPTION - PAIN TYPE
TYPE: ACUTE PAIN

## 2023-07-20 ASSESSMENT — COGNITIVE AND FUNCTIONAL STATUS - GENERAL
MOVING FROM LYING ON BACK TO SITTING ON SIDE OF FLAT BED: UNABLE
STANDING UP FROM CHAIR USING ARMS: A LOT
DAILY ACTIVITIY SCORE: 9
EATING MEALS: TOTAL
TOILETING: TOTAL
DRESSING REGULAR LOWER BODY CLOTHING: A LOT
SUGGESTED CMS G CODE MODIFIER MOBILITY: CM
SUGGESTED CMS G CODE MODIFIER DAILY ACTIVITY: CL
WALKING IN HOSPITAL ROOM: TOTAL
MOVING TO AND FROM BED TO CHAIR: UNABLE
DRESSING REGULAR UPPER BODY CLOTHING: A LOT
HELP NEEDED FOR BATHING: TOTAL
MOBILITY SCORE: 7
CLIMB 3 TO 5 STEPS WITH RAILING: TOTAL
PERSONAL GROOMING: A LOT
TURNING FROM BACK TO SIDE WHILE IN FLAT BAD: UNABLE

## 2023-07-20 ASSESSMENT — GAIT ASSESSMENTS: GAIT LEVEL OF ASSIST: UNABLE TO PARTICIPATE

## 2023-07-20 NOTE — ASSESSMENT & PLAN NOTE
Full Code: I discussed code status with patient's son. Per patient's son, patient wishes to have all life sustaining efforts if needed.   I discussed advance care planning with the patient's family for 16 minutes, including diagnosis, prognosis, plan of care, risks and benefits of any therapies that could be offered, as well as alternatives including palliation and hospice, as appropriate.
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
## 2023-07-20 NOTE — DISCHARGE PLANNING
Late entry for 7/19/2023    Case Management Discharge Planning    Admission Date: 7/13/2023  GMLOS: 4.1  ALOS: 6    6-Clicks ADL Score: 8  6-Clicks Mobility Score: 7  PT and/or OT Eval ordered: Yes  Post-acute Referrals Ordered: Yes  Post-acute Choice Obtained: No  Has referral(s) been sent to post-acute provider:  Yes ([facility] per protocol)      Anticipated Discharge Dispo: Discharge Disposition: D/T to SNF with Medicare cert in anticipation of skilled care (03)    DME Needed: No    Action(s) Taken: Case discussed in IDT rounds:  Per Dr. Richards, plan is for PEG placement.   Not a candidate for IRF due to lack of DC support.  CM to obtain SNF choices.    Escalations Completed: None    Medically Clear: No    Next Steps: CM will f/u on medical clearance & SNF choices/referrals    Barriers to Discharge: Medical clearance and Pending Placement    Is the patient up for discharge tomorrow: No
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
## 2023-07-20 NOTE — CARE PLAN
The patient is Stable - Low risk of patient condition declining or worsening    Shift Goals  Clinical Goals: Monitor neuro status, safety, maintain skin integrity, mobilization  Patient Goals: Rest  Family Goals: BRENDAN    Progress made toward(s) clinical / shift goals:  Pt AAOx2-3 throughout shift. Continues to have right sided weakness, right facial droop, slurred speech and neglect. NG tube in place for food and medication. Up with x2 assist. Fall, aspiration and seizure precautions in place. POC discussed with family via phone.     Patient is not progressing towards the following goals:

## 2023-07-20 NOTE — ASSESSMENT & PLAN NOTE
Patient still requiring NGT, reevaluated by speech therapy, patient will require PEG tube.    Discussed with patient and patient's daughter-in-law in regards to the need of PEG tube how this may or may not be temporary versus permanent however this will allow him to have sufficient oral intake to help facilitate engagement in energy for PT/OT and SLP at skilled nursing facility.    Patient will discuss with family about PEG tube.   7/20: discussed with son Pramod today. Pramod agrees PEG placement. I have consulted and discussed with GI.   7/21: discussed with GI, given ongoing fever, tachycardia and pneumonia. GI plans PEG on 7/24  PEG placed on 7/24  Tolerating tube feeds

## 2023-07-20 NOTE — THERAPY
Physical Therapy   Daily Treatment     Patient Name: Niyah Collins  Age:  77 y.o., Sex:  male  Medical Record #: [id number]  Today's Date: 7/20/2023     Precautions  Precautions: Fall Risk;Swallow Precautions  Comments: L gaze preference;    Assessment    Pt making good balance progress, good command following; quick to fatigue; standing abilities with mod a of 2 and good balance with one step but significant fear of falling;     Plan    Treatment Plan Status: Continue Current Treatment Plan  Type of Treatment: Bed Mobility, Gait Training, Neuro Re-Education / Balance, Self Care / Home Evaluation, Therapeutic Activities  Treatment Frequency: 4 Times per Week  Treatment Duration: Until Therapy Goals Met    DC Equipment Recommendations: Unable to determine at this time  Discharge Recommendations: SNF       Abridged Subjective/Objective     07/20/23 1540   Cognition    Cognition / Consciousness X   Speech/ Communication Dysarthric   Level of Consciousness Alert   Ability To Follow Commands 1 Step   Safety Awareness Impaired   New Learning Impaired   Attention Impaired   Sequencing Impaired   Comments needs cues to attend and participate, quickly fatigued at EOB   Strength Lower Body   Lower Body Strength  X   Comments right LE: knee extension cannot be illicited , does have some intact DTR with repetition of tapping;   Sensation Lower Body   Lower Extremity Sensation   X   Comments does not response with light touch does not answer when asked;   Balance   Sitting Balance (Static) Poor +  (with left UE support; with fatigue more poor)   Sitting Balance (Dynamic) Poor -   Standing Balance (Static) Trace   Standing Balance (Dynamic) Trace   Weight Shift Sitting Poor   Skilled Intervention Sequencing;Postural Facilitation;Tactile Cuing;Verbal Cuing   Comments LUE support on railing, right UE facilitation of weight bearing through; needing phsyical cues to use abs and LE stability;   Bed Mobility    Supine to Sit   (NT, up  EOB with OT)   Sit to Supine Moderate Assist  (pulling wiht left LE and UE back to bed)   Skilled Intervention Tactile Cuing;Verbal Cuing;Sequencing   Comments cues for laying self down; followed all commands but fearful of falling;   Gait Analysis   Gait Level Of Assist Unable to Participate   Functional Mobility   Sit to Stand Moderate Assist  (pulling on therapist ; would likely transfer well to the left;)   Short Term Goals    Short Term Goal # 1 Pt will transfer supine<>sit with Kayli in 6 visits to improve bed mobility.   Goal Outcome # 1 goal not met   Short Term Goal # 2 Pt will maintain midline unsupported position SBA >2 minutes while EOB to improve trunk control   Goal Outcome # 2 Goal not met   Short Term Goal # 3 Pt will transfer stand step bed<>chair/wc CGA w/LRAD in 6 visits to improve transfer abilities.   Goal Outcome # 3 Goal not met   Short Term Goal # 4 Pt will ambulate 5' maxA w/hemiwalker in 6 visits to improve standing and walking abilities.   Goal Outcome # 4 Goal not met

## 2023-07-20 NOTE — THERAPY
"Occupational Therapy  Daily Treatment     Patient Name: Niyah Collins  Age:  77 y.o., Sex:  male  Medical Record #: [id number]  Today's Date: 7/20/2023     Precautions: Fall Risk, Swallow Precautions, Nasogastric Tube  Comments: L gaze preference, dysarthria, R hemiplegia    Assessment  Pt was seen for OT tx, first half of session focused on obtaining midline sitting balance EOB and participating in seated ADL's (grooming and UB dressing) w/use of LUE. Facilitated AAROM of RUE no observed muscle contraction but did show ability to locate and positioning RUE w/use of LUE. 2nd part of session collaborated w/PT for standing facilitation to maximize safety. Pt remained up w/PT for next session .    Plan  Treatment Plan Status: Modify Current Treatment Plan  Type of Treatment: Self Care / Activities of Daily Living, Adaptive Equipment, Neuro Re-Education / Balance, Therapeutic Exercises, Therapeutic Activity, Sensory Integration Techniques, Manual Therapy Techniques, Cognitive Skill Development, Orthotics Treatment  Treatment Frequency: 4 Times per Week  Treatment Duration: Until Therapy Goals Met    DC Equipment Recommendations: Unable to determine at this time  Discharge Recommendations: Recommend post-acute placement for additional occupational therapy services prior to discharge home    Subjective    \"I need to lay down\"      Objective       07/20/23 1448   Treatment Charges   Charges Yes   OT Self Care / ADL (Units) 1   OT Therapy Activity (Units) 1   Total Time Spent   OT Time Spent Yes   OT Self Care / ADL (Minutes) 17   OT Therapy Activity (Minutes) 15   OT Total Time Spent (Calculated) 32    Services   Is patient using  services for this encounter? No   Precautions   Precautions Fall Risk;Swallow Precautions;Nasogastric Tube   Comments L gaze preference, dysarthria, R hemiplegia   Pain 0 - 10 Group   Therapist Pain Assessment During Activity;Nurse Notified  (not rated)   Cognition  "   Cognition / Consciousness X   Orientation Level Other (Comment)   Level of Consciousness Alert   Ability To Follow Commands 1 Step   Safety Awareness Impaired   New Learning Impaired   Attention Impaired   Sequencing Impaired   Comments Soft spoken difficult to understand R inattention but able to cue to engage L w/R   Strength Upper Body   Upper Body Strength  X   Comments LUE WFL although fatigued quickly w/active use to support postural control observed fasiculation w/LUE RUE flaccid no observed muslce contraction this session   Upper Body Muscle Tone   Upper Body Muscle Tone  X   Rt Upper Extremity Muscle Tone Hypotonic   Other Treatments   Other Treatments Provided Focused on EOB grooming w/midline posture, AAROM of RUE, cross body reaching w/LUE for RUE placement and standing faciliation w/PT at end of session   Balance   Sitting Balance (Static) Poor   Sitting Balance (Dynamic) Poor -   Standing Balance (Static) Trace   Standing Balance (Dynamic) Dependent   Weight Shift Sitting Poor   Weight Shift Standing Absent   Skilled Intervention Verbal Cuing;Tactile Cuing;Facilitation;Compensatory Strategies   Comments w/physical assist   Bed Mobility    Supine to Sit Moderate Assist   Sit to Supine   (up w/PT)   Skilled Intervention Verbal Cuing;Tactile Cuing;Facilitation   Comments up w/PT post session   Activities of Daily Living   Grooming Moderate Assist;Seated   Upper Body Dressing Maximal Assist   Lower Body Dressing Maximal Assist   Toileting   (NT)   Skilled Intervention Verbal Cuing;Tactile Cuing;Facilitation;Compensatory Strategies   Comments was able to reach for and use LUE to participate in face washing, w/postural support, considerable fatigue w/sitting balance w/o LUE support, no functional use of RUE   How much help from another person does the patient currently need...   6 Clicks Daily Activity Score 9   Functional Mobility   Sit to Stand Moderate Assist   Bed, Chair, Wheelchair Transfer Maximal  Assist   Mobility EOB sit>Stand EOB   Skilled Intervention Verbal Cuing;Tactile Cuing;Facilitation;Compensatory Strategies   Activity Tolerance   Comments c/o fatigue w/sitting activity   Patient / Family Goals   Patient / Family Goal #1 Daughter's goal: for pt to eat   Goal #1 Outcome Goal not met   Short Term Goals   Short Term Goal # 1 Pt will sit EOB with CGA for >5 min to participate in grooming task   Goal Outcome # 1 Progressing as expected   Short Term Goal # 2 Pt will complete oral care with SBA   Goal Outcome # 2 Progressing as expected   Short Term Goal # 3 Pt will complete gown change with mod A using morteza technique   Goal Outcome # 3 Progressing as expected   Short Term Goal # 4 Pt will attend to objects on R side with min verbal cues during ADL   Goal Outcome # 4 Progressing as expected   Education Group   Role of Occupational Therapist Patient Response Patient;Acceptance;Explanation;Verbal Demonstration;Reinforcement Needed   Joint Protection Patient Response Patient;Acceptance;Explanation;Demonstration;Verbal Demonstration   Occupational Therapy Treatment Plan    O.T. Treatment Plan Modify Current Treatment Plan   Treatment Frequency 4 Times per Week   Anticipated Discharge Equipment and Recommendations   DC Equipment Recommendations Unable to determine at this time   Discharge Recommendations Recommend post-acute placement for additional occupational therapy services prior to discharge home   Interdisciplinary Plan of Care Collaboration   IDT Collaboration with  Nursing;Physical Therapist   Patient Position at End of Therapy   (up w/PT)   Collaboration Comments RN aware of session   Session Information   Date / Session Number  7/20 #2 (2/4, 7/23)

## 2023-07-20 NOTE — CARE PLAN
The patient is Watcher - Medium risk of patient condition declining or worsening    Shift Goals  Clinical Goals: BP parameters, up to chair  Patient Goals: Ice chips  Family Goals: Increase mobility    Progress made toward(s) clinical / shift goals:    Problem: Hemodynamic Monitoring  Goal: Patient's hemodynamics, fluid balance and neurologic status will be stable or improve  Note: BP within parameters, AO3-4, lethargic, right sided weakness/neglect.       Problem: Mobility - Stroke  Goal: Patient's capacity to carry out activities will improve  Outcome: Progressing  Note: Patient up to chair at beginning of shift. Q2 turns in place, waffle overlay in use.       Patient is not progressing towards the following goals:

## 2023-07-21 ENCOUNTER — APPOINTMENT (OUTPATIENT)
Dept: RADIOLOGY | Facility: MEDICAL CENTER | Age: 78
DRG: 023 | End: 2023-07-21
Payer: MEDICARE

## 2023-07-21 PROBLEM — J96.00 ACUTE RESPIRATORY FAILURE (HCC): Status: ACTIVE | Noted: 2023-07-21

## 2023-07-21 PROBLEM — J18.9 PNEUMONIA: Status: ACTIVE | Noted: 2023-07-21

## 2023-07-21 LAB
ANION GAP SERPL CALC-SCNC: 15 MMOL/L (ref 7–16)
BUN SERPL-MCNC: 57 MG/DL (ref 8–22)
CALCIUM SERPL-MCNC: 10 MG/DL (ref 8.5–10.5)
CHLORIDE SERPL-SCNC: 102 MMOL/L (ref 96–112)
CO2 SERPL-SCNC: 24 MMOL/L (ref 20–33)
CREAT SERPL-MCNC: 1.08 MG/DL (ref 0.5–1.4)
EKG IMPRESSION: NORMAL
ERYTHROCYTE [DISTWIDTH] IN BLOOD BY AUTOMATED COUNT: 39.8 FL (ref 35.9–50)
GFR SERPLBLD CREATININE-BSD FMLA CKD-EPI: 71 ML/MIN/1.73 M 2
GLUCOSE BLD STRIP.AUTO-MCNC: 152 MG/DL (ref 65–99)
GLUCOSE SERPL-MCNC: 137 MG/DL (ref 65–99)
HCT VFR BLD AUTO: 50.8 % (ref 42–52)
HGB BLD-MCNC: 18.1 G/DL (ref 14–18)
LACTATE SERPL-SCNC: 1.4 MMOL/L (ref 0.5–2)
LACTATE SERPL-SCNC: 2.5 MMOL/L (ref 0.5–2)
MCH RBC QN AUTO: 30.4 PG (ref 27–33)
MCHC RBC AUTO-ENTMCNC: 35.6 G/DL (ref 32.3–36.5)
MCV RBC AUTO: 85.4 FL (ref 81.4–97.8)
PLATELET # BLD AUTO: 265 K/UL (ref 164–446)
PMV BLD AUTO: 10 FL (ref 9–12.9)
POTASSIUM SERPL-SCNC: 4.3 MMOL/L (ref 3.6–5.5)
PROCALCITONIN SERPL-MCNC: 0.6 NG/ML
RBC # BLD AUTO: 5.95 M/UL (ref 4.7–6.1)
SODIUM SERPL-SCNC: 141 MMOL/L (ref 135–145)
WBC # BLD AUTO: 11.6 K/UL (ref 4.8–10.8)

## 2023-07-21 PROCEDURE — 700102 HCHG RX REV CODE 250 W/ 637 OVERRIDE(OP)

## 2023-07-21 PROCEDURE — 700102 HCHG RX REV CODE 250 W/ 637 OVERRIDE(OP): Performed by: PSYCHIATRY & NEUROLOGY

## 2023-07-21 PROCEDURE — A9270 NON-COVERED ITEM OR SERVICE: HCPCS | Performed by: STUDENT IN AN ORGANIZED HEALTH CARE EDUCATION/TRAINING PROGRAM

## 2023-07-21 PROCEDURE — 700102 HCHG RX REV CODE 250 W/ 637 OVERRIDE(OP): Performed by: INTERNAL MEDICINE

## 2023-07-21 PROCEDURE — 93010 ELECTROCARDIOGRAM REPORT: CPT | Performed by: STUDENT IN AN ORGANIZED HEALTH CARE EDUCATION/TRAINING PROGRAM

## 2023-07-21 PROCEDURE — 87040 BLOOD CULTURE FOR BACTERIA: CPT

## 2023-07-21 PROCEDURE — A9270 NON-COVERED ITEM OR SERVICE: HCPCS | Performed by: INTERNAL MEDICINE

## 2023-07-21 PROCEDURE — 93005 ELECTROCARDIOGRAM TRACING: CPT

## 2023-07-21 PROCEDURE — A9270 NON-COVERED ITEM OR SERVICE: HCPCS | Performed by: PSYCHIATRY & NEUROLOGY

## 2023-07-21 PROCEDURE — 700111 HCHG RX REV CODE 636 W/ 250 OVERRIDE (IP): Mod: JZ | Performed by: PSYCHIATRY & NEUROLOGY

## 2023-07-21 PROCEDURE — 36415 COLL VENOUS BLD VENIPUNCTURE: CPT

## 2023-07-21 PROCEDURE — 85027 COMPLETE CBC AUTOMATED: CPT

## 2023-07-21 PROCEDURE — 700105 HCHG RX REV CODE 258: Performed by: STUDENT IN AN ORGANIZED HEALTH CARE EDUCATION/TRAINING PROGRAM

## 2023-07-21 PROCEDURE — 84145 PROCALCITONIN (PCT): CPT

## 2023-07-21 PROCEDURE — 71045 X-RAY EXAM CHEST 1 VIEW: CPT

## 2023-07-21 PROCEDURE — 700102 HCHG RX REV CODE 250 W/ 637 OVERRIDE(OP): Performed by: STUDENT IN AN ORGANIZED HEALTH CARE EDUCATION/TRAINING PROGRAM

## 2023-07-21 PROCEDURE — 700111 HCHG RX REV CODE 636 W/ 250 OVERRIDE (IP): Mod: JZ | Performed by: STUDENT IN AN ORGANIZED HEALTH CARE EDUCATION/TRAINING PROGRAM

## 2023-07-21 PROCEDURE — 770001 HCHG ROOM/CARE - MED/SURG/GYN PRIV*

## 2023-07-21 PROCEDURE — 83605 ASSAY OF LACTIC ACID: CPT

## 2023-07-21 PROCEDURE — 99233 SBSQ HOSP IP/OBS HIGH 50: CPT | Performed by: STUDENT IN AN ORGANIZED HEALTH CARE EDUCATION/TRAINING PROGRAM

## 2023-07-21 PROCEDURE — A9270 NON-COVERED ITEM OR SERVICE: HCPCS

## 2023-07-21 PROCEDURE — 82962 GLUCOSE BLOOD TEST: CPT

## 2023-07-21 PROCEDURE — 80048 BASIC METABOLIC PNL TOTAL CA: CPT

## 2023-07-21 RX ORDER — SODIUM CHLORIDE 9 MG/ML
1000 INJECTION, SOLUTION INTRAVENOUS ONCE
Status: COMPLETED | OUTPATIENT
Start: 2023-07-21 | End: 2023-07-21

## 2023-07-21 RX ORDER — ACETAMINOPHEN 650 MG/1
650 SUPPOSITORY RECTAL ONCE
Status: COMPLETED | OUTPATIENT
Start: 2023-07-21 | End: 2023-07-21

## 2023-07-21 RX ADMIN — ASPIRIN 81 MG 81 MG: 81 TABLET ORAL at 18:30

## 2023-07-21 RX ADMIN — MIDODRINE HYDROCHLORIDE 5 MG: 5 TABLET ORAL at 18:30

## 2023-07-21 RX ADMIN — MIDODRINE HYDROCHLORIDE 5 MG: 5 TABLET ORAL at 12:07

## 2023-07-21 RX ADMIN — PIPERACILLIN AND TAZOBACTAM 3.38 G: 3; .375 INJECTION, POWDER, FOR SOLUTION INTRAVENOUS at 09:46

## 2023-07-21 RX ADMIN — ACETAMINOPHEN 650 MG: 650 SUPPOSITORY RECTAL at 06:46

## 2023-07-21 RX ADMIN — ENOXAPARIN SODIUM 40 MG: 100 INJECTION SUBCUTANEOUS at 18:30

## 2023-07-21 RX ADMIN — SODIUM CHLORIDE 1000 ML: 9 INJECTION, SOLUTION INTRAVENOUS at 09:04

## 2023-07-21 RX ADMIN — ATORVASTATIN CALCIUM 80 MG: 80 TABLET, FILM COATED ORAL at 18:30

## 2023-07-21 RX ADMIN — PIPERACILLIN AND TAZOBACTAM 3.38 G: 3; .375 INJECTION, POWDER, FOR SOLUTION INTRAVENOUS at 13:45

## 2023-07-21 RX ADMIN — PIPERACILLIN AND TAZOBACTAM 3.38 G: 3; .375 INJECTION, POWDER, FOR SOLUTION INTRAVENOUS at 20:08

## 2023-07-21 ASSESSMENT — ENCOUNTER SYMPTOMS
BACK PAIN: 0
SENSORY CHANGE: 1
FOCAL WEAKNESS: 1
DIARRHEA: 0
BLURRED VISION: 0
NAUSEA: 0
VOMITING: 0
FEVER: 0
DIZZINESS: 0
ABDOMINAL PAIN: 0
CHILLS: 0
LOSS OF CONSCIOUSNESS: 0
PALPITATIONS: 0
SORE THROAT: 0
HEADACHES: 0
SHORTNESS OF BREATH: 0
DOUBLE VISION: 0
COUGH: 0

## 2023-07-21 ASSESSMENT — PAIN DESCRIPTION - PAIN TYPE: TYPE: ACUTE PAIN

## 2023-07-21 NOTE — PROGRESS NOTES
Upon AM vitals at 0400, CNA reported that pt has a HR of 133. At 0500 and 0600, pt vs was rechecked and charted. Pt was A/Ox2. Pt did not report any symptoms. BS: at 152. At 0600 pt had a temperature of 101.5F temporal. Unable to take oral temp because pt has right facial droop and unable to close mouth. Pt feels warm/hot from head down upon assessment. Blankets removed.      At 0639, RN notified Abel Tirado (on call hospitalist) about pt's change of vital signs and temperature. Suppository tylenol, stat chest xray, and stat EKG ordered.    Pt's procedure for the peg tube was scheduled at 1000. Patricia ALMENDAREZ from pre-op called for report. Updated Patricia of pt's status.

## 2023-07-21 NOTE — ASSESSMENT & PLAN NOTE
7/21 With fever, tachycardia. Concerning pneumonia, aspiration?  Patient is put on 4L O2.   Ordered CXR and EKG stat  I have reviewed CXR notes patchy bilateral lung base opacities.   I have reviewed EKG stat,showing sinus tachycardia.  I ordered stat lactic acid 2.5, blood culture x2, procalcitonin 0.6  Start iv abx zosyn  Following cultures and lactic acid  7/24: procalcitonin trending down. Afebrile. Wbc trending down. On room air    Continue IV zosyn day 5/5

## 2023-07-21 NOTE — PROGRESS NOTES
NOC HOSPITALIST CROSS COVER    Notified by RN regarding tachycardia with a new fever of 101. The nurse also reported crackles on exam. STAT EKG and CXR ordered and pending. Handoff given to dayshift hospitalist to follow up.      Vitals:    07/21/23 0641   BP:    Pulse: (!) 128   Resp:    Temp:    SpO2: 93%       -----------------------------------------------------------------------------------------------------------    Electronically signed by:  Abel Tirado, AIDEE, APRN, ROBERTOP-BC  Hospitalist Services

## 2023-07-21 NOTE — DISCHARGE PLANNING
CM received message earlier today from CM  requesting callback to EPS , Gudelia (Office #[phone], work cell #[phone]).   Call placed to Gudelia and clinical as well as DC updates provided, as requested.   Gudelia will f/u with CM for DCP.    **1555 Hrs - CM called patient's son, Pramod #[phone], to discuss DCP as patient remains dysarthric & unable to communicate with CM.    Pramod agreeable to SNF placement & requested for referral to be sent only to [facility] in [place].   Pramod was made aware of likelihood of out-of-pocket transportation cost and stated that his siblings and himself would pay for transportation.   Pramod willing to consider other SNF options should [facility] decline referral.    Choice form faxed to DPA.

## 2023-07-21 NOTE — DISCHARGE PLANNING
Received Choice form @: 8550  Agency/Facility Name: [facility]  Referral sent per Choice form @: 2756  DPA manually sent referral to [phone].

## 2023-07-21 NOTE — DISCHARGE PLANNING
ER SW informed that Vanessa and Damaris are in the ER lobby looking for this Pt.  SW placed call to Pt's Son, Pramod and obtained permission to inform them that they are no able to see the Pt while he is here at the hospital.  JUSTO meet with Higinio in the ER lobby.  Vanessa and Damaris verbalized understanding.

## 2023-07-21 NOTE — CARE PLAN
The patient is Stable - Low risk of patient condition declining or worsening    Shift Goals  Clinical Goals: monitor neuro status, maintain skin integrity, tolerate tube feeds  Patient Goals: rest  Family Goals: update on plan of care    Progress made toward(s) clinical / shift goals:      Problem: Psychosocial - Patient Condition  Goal: Patient's ability to verbalize feelings about condition will improve  Outcome: Progressing  Flowsheets (Taken 7/21/2023 0401)  Condition Will Improve:   Encouraged patient participation in care   Discussed coping with medical condition and its effects  Note: Pt able to verbalize for his needs. Discussed plan of care, medical condition and updated pt on interventions.     Problem: Neuro Status  Goal: Neuro status will remain stable or improve  Outcome: Progressing  Note: Pt neuro status remained the same thru out the night. Unchanged thru out shift and no acute changes.     Problem: Hemodynamic Monitoring  Goal: Patient's hemodynamics, fluid balance and neurologic status will be stable or improve  Outcome: Progressing  Note: Monitoring and maintain BP WDL     Problem: Urinary Elimination  Goal: Establish and maintain regular urinary output  Outcome: Progressing  Note: Monitoring urine output with condom catheter     Problem: Bowel Elimination  Goal: Establish and maintain regular bowel function  Outcome: Progressing  Note: Pt given miralax and monitor pt for BM     Problem: Knowledge Deficit - Standard  Goal: Patient and family/care givers will demonstrate understanding of plan of care, disease process/condition, diagnostic tests and medications  Outcome: Progressing  Note: Plan of care and medications discussed and reviewed over with pt.      Problem: Skin Integrity  Goal: Skin integrity is maintained or improved  Outcome: Progressing  Note: Pt on waffle mattress, q2h turns , mepliexes on, and pillows used for repositioning.

## 2023-07-21 NOTE — CARE PLAN
The patient is Stable - Low risk of patient condition declining or worsening    Shift Goals  Clinical Goals: Safety/ maintain skin integrity  Patient Goals: rest  Family Goals: update on plan of care    Progress made toward(s) clinical / shift goals:    Problem: Risk for Aspiration  Goal: Patient's risk for aspiration will be absent or decrease  Outcome: Progressing   Pt up 30-90 degrees during tube feeds.     Problem: Fall Risk  Goal: Patient will remain free from falls  Outcome: Progressing   Fall precautions in place. Call light, bedside table, and pt belongings within reach.    Patient is not progressing towards the following goals:N/A

## 2023-07-21 NOTE — PROGRESS NOTES
Brief GI note:    No bowel movement since 7/17  KUB with +bowel gas and stool burden   Abdominal tenderness on exam  Please given bowel management protocol to ensure patient is having daily bowel movements  PEG rescheduled until Monday      ..ESTIVEN Bruce.
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
## 2023-07-22 PROBLEM — A41.9 SEPSIS (HCC): Status: ACTIVE | Noted: 2023-07-22

## 2023-07-22 PROBLEM — N17.9 AKI (ACUTE KIDNEY INJURY) (HCC): Status: ACTIVE | Noted: 2023-07-22

## 2023-07-22 PROBLEM — R79.89 ELEVATED LFTS: Status: ACTIVE | Noted: 2023-07-22

## 2023-07-22 LAB
ALBUMIN SERPL BCP-MCNC: 3.3 G/DL (ref 3.2–4.9)
ALBUMIN/GLOB SERPL: 0.9 G/DL
ALP SERPL-CCNC: 267 U/L (ref 30–99)
ALT SERPL-CCNC: 323 U/L (ref 2–50)
ANION GAP SERPL CALC-SCNC: 13 MMOL/L (ref 7–16)
AST SERPL-CCNC: 178 U/L (ref 12–45)
BILIRUB SERPL-MCNC: 1.1 MG/DL (ref 0.1–1.5)
BUN SERPL-MCNC: 58 MG/DL (ref 8–22)
CALCIUM ALBUM COR SERPL-MCNC: 10.2 MG/DL (ref 8.5–10.5)
CALCIUM SERPL-MCNC: 9.6 MG/DL (ref 8.5–10.5)
CHLORIDE SERPL-SCNC: 107 MMOL/L (ref 96–112)
CO2 SERPL-SCNC: 25 MMOL/L (ref 20–33)
CREAT SERPL-MCNC: 1.42 MG/DL (ref 0.5–1.4)
ERYTHROCYTE [DISTWIDTH] IN BLOOD BY AUTOMATED COUNT: 41.7 FL (ref 35.9–50)
GFR SERPLBLD CREATININE-BSD FMLA CKD-EPI: 51 ML/MIN/1.73 M 2
GLOBULIN SER CALC-MCNC: 3.6 G/DL (ref 1.9–3.5)
GLUCOSE SERPL-MCNC: 172 MG/DL (ref 65–99)
HCT VFR BLD AUTO: 48.1 % (ref 42–52)
HGB BLD-MCNC: 16.4 G/DL (ref 14–18)
MAGNESIUM SERPL-MCNC: 2.8 MG/DL (ref 1.5–2.5)
MCH RBC QN AUTO: 29.8 PG (ref 27–33)
MCHC RBC AUTO-ENTMCNC: 34.1 G/DL (ref 32.3–36.5)
MCV RBC AUTO: 87.5 FL (ref 81.4–97.8)
PHOSPHATE SERPL-MCNC: 4.1 MG/DL (ref 2.5–4.5)
PLATELET # BLD AUTO: 255 K/UL (ref 164–446)
PMV BLD AUTO: 10.3 FL (ref 9–12.9)
POTASSIUM SERPL-SCNC: 4.3 MMOL/L (ref 3.6–5.5)
PROT SERPL-MCNC: 6.9 G/DL (ref 6–8.2)
RBC # BLD AUTO: 5.5 M/UL (ref 4.7–6.1)
SODIUM SERPL-SCNC: 145 MMOL/L (ref 135–145)
WBC # BLD AUTO: 12 K/UL (ref 4.8–10.8)

## 2023-07-22 PROCEDURE — 700105 HCHG RX REV CODE 258: Mod: JZ | Performed by: STUDENT IN AN ORGANIZED HEALTH CARE EDUCATION/TRAINING PROGRAM

## 2023-07-22 PROCEDURE — 700102 HCHG RX REV CODE 250 W/ 637 OVERRIDE(OP): Performed by: PSYCHIATRY & NEUROLOGY

## 2023-07-22 PROCEDURE — 51798 US URINE CAPACITY MEASURE: CPT

## 2023-07-22 PROCEDURE — 36415 COLL VENOUS BLD VENIPUNCTURE: CPT

## 2023-07-22 PROCEDURE — A9270 NON-COVERED ITEM OR SERVICE: HCPCS | Performed by: PSYCHIATRY & NEUROLOGY

## 2023-07-22 PROCEDURE — 84100 ASSAY OF PHOSPHORUS: CPT

## 2023-07-22 PROCEDURE — 700102 HCHG RX REV CODE 250 W/ 637 OVERRIDE(OP): Performed by: STUDENT IN AN ORGANIZED HEALTH CARE EDUCATION/TRAINING PROGRAM

## 2023-07-22 PROCEDURE — 770001 HCHG ROOM/CARE - MED/SURG/GYN PRIV*

## 2023-07-22 PROCEDURE — 700111 HCHG RX REV CODE 636 W/ 250 OVERRIDE (IP): Mod: JZ | Performed by: STUDENT IN AN ORGANIZED HEALTH CARE EDUCATION/TRAINING PROGRAM

## 2023-07-22 PROCEDURE — 700102 HCHG RX REV CODE 250 W/ 637 OVERRIDE(OP): Performed by: INTERNAL MEDICINE

## 2023-07-22 PROCEDURE — 99233 SBSQ HOSP IP/OBS HIGH 50: CPT | Performed by: STUDENT IN AN ORGANIZED HEALTH CARE EDUCATION/TRAINING PROGRAM

## 2023-07-22 PROCEDURE — A9270 NON-COVERED ITEM OR SERVICE: HCPCS | Performed by: STUDENT IN AN ORGANIZED HEALTH CARE EDUCATION/TRAINING PROGRAM

## 2023-07-22 PROCEDURE — 700111 HCHG RX REV CODE 636 W/ 250 OVERRIDE (IP): Mod: JZ | Performed by: PSYCHIATRY & NEUROLOGY

## 2023-07-22 PROCEDURE — 83735 ASSAY OF MAGNESIUM: CPT

## 2023-07-22 PROCEDURE — 80053 COMPREHEN METABOLIC PANEL: CPT

## 2023-07-22 PROCEDURE — 85027 COMPLETE CBC AUTOMATED: CPT

## 2023-07-22 PROCEDURE — A9270 NON-COVERED ITEM OR SERVICE: HCPCS | Performed by: INTERNAL MEDICINE

## 2023-07-22 RX ORDER — SODIUM CHLORIDE, SODIUM LACTATE, POTASSIUM CHLORIDE, CALCIUM CHLORIDE 600; 310; 30; 20 MG/100ML; MG/100ML; MG/100ML; MG/100ML
INJECTION, SOLUTION INTRAVENOUS CONTINUOUS
Status: DISCONTINUED | OUTPATIENT
Start: 2023-07-22 | End: 2023-07-25

## 2023-07-22 RX ADMIN — ENOXAPARIN SODIUM 40 MG: 100 INJECTION SUBCUTANEOUS at 17:08

## 2023-07-22 RX ADMIN — MIDODRINE HYDROCHLORIDE 5 MG: 5 TABLET ORAL at 17:08

## 2023-07-22 RX ADMIN — ASPIRIN 81 MG 81 MG: 81 TABLET ORAL at 17:08

## 2023-07-22 RX ADMIN — SODIUM CHLORIDE, POTASSIUM CHLORIDE, SODIUM LACTATE AND CALCIUM CHLORIDE: 600; 310; 30; 20 INJECTION, SOLUTION INTRAVENOUS at 08:47

## 2023-07-22 RX ADMIN — SODIUM CHLORIDE, POTASSIUM CHLORIDE, SODIUM LACTATE AND CALCIUM CHLORIDE: 600; 310; 30; 20 INJECTION, SOLUTION INTRAVENOUS at 21:41

## 2023-07-22 RX ADMIN — PIPERACILLIN AND TAZOBACTAM 3.38 G: 3; .375 INJECTION, POWDER, FOR SOLUTION INTRAVENOUS at 13:45

## 2023-07-22 RX ADMIN — PIPERACILLIN AND TAZOBACTAM 3.38 G: 3; .375 INJECTION, POWDER, FOR SOLUTION INTRAVENOUS at 04:02

## 2023-07-22 RX ADMIN — PIPERACILLIN AND TAZOBACTAM 3.38 G: 3; .375 INJECTION, POWDER, FOR SOLUTION INTRAVENOUS at 21:47

## 2023-07-22 RX ADMIN — ATORVASTATIN CALCIUM 80 MG: 80 TABLET, FILM COATED ORAL at 17:08

## 2023-07-22 RX ADMIN — CHLORPROMAZINE HYDROCHLORIDE 25 MG: 25 TABLET, FILM COATED ORAL at 11:04

## 2023-07-22 RX ADMIN — MIDODRINE HYDROCHLORIDE 5 MG: 5 TABLET ORAL at 10:53

## 2023-07-22 RX ADMIN — MIDODRINE HYDROCHLORIDE 5 MG: 5 TABLET ORAL at 08:48

## 2023-07-22 ASSESSMENT — ENCOUNTER SYMPTOMS
BACK PAIN: 0
HEADACHES: 0
PALPITATIONS: 0
SENSORY CHANGE: 1
VOMITING: 0
FEVER: 0
DIZZINESS: 0
LOSS OF CONSCIOUSNESS: 0
DOUBLE VISION: 0
ABDOMINAL PAIN: 0
SORE THROAT: 0
COUGH: 0
DIARRHEA: 0
BLURRED VISION: 0
NAUSEA: 0
FOCAL WEAKNESS: 1
SHORTNESS OF BREATH: 0
CHILLS: 0

## 2023-07-22 ASSESSMENT — PAIN DESCRIPTION - PAIN TYPE: TYPE: ACUTE PAIN

## 2023-07-22 NOTE — CARE PLAN
The patient is Stable - Low risk of patient condition declining or worsening    Shift Goals  Clinical Goals: Safety/maintain skin integrity  Patient Goals: rest overnight  Family Goals: updates    Progress made toward(s) clinical / shift goals:      Problem: Fall Risk  Goal: Patient will remain free from falls  Outcome: Progressing   Fall precautions in place. Pt has call light, bedside table, and pt belongings within reach. Pt checked for incontinence during rounding.    Problem: Skin Integrity  Goal: Skin integrity is maintained or improved  Outcome: Progressing   Pt repositioned every 2 hours and as needed.        Patient is not progressing towards the following goals: N/A

## 2023-07-22 NOTE — ASSESSMENT & PLAN NOTE
Unclear etiology. secondary to sepsis?  Check hepatitis panel negative. Total bilirubin normal  US RUQ: Gallbladder sludge and stones without biliary dilation. NO pericholecystic fluid.  Resume statin
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
## 2023-07-22 NOTE — CARE PLAN
The patient is Stable - Low risk of patient condition declining or worsening    Shift Goals  Clinical Goals: safety, comfort, placement, VSS, iv abx  Patient Goals: rest overnight  Family Goals: updates    Progress made toward(s) clinical / shift goals:    Problem: Optimal Care of the Stroke Patient  Goal: Optimal acute care for the stroke patient  Outcome: Progressing     Problem: Discharge Planning - Stroke  Goal: Ensure Stroke Core Measures are met prior to discharge  Outcome: Progressing     Problem: Hemodynamic Monitoring  Goal: Patient's hemodynamics, fluid balance and neurologic status will be stable or improve  Outcome: Progressing     Problem: Fall Risk  Goal: Patient will remain free from falls  Outcome: Progressing

## 2023-07-23 ENCOUNTER — APPOINTMENT (OUTPATIENT)
Dept: RADIOLOGY | Facility: MEDICAL CENTER | Age: 78
DRG: 023 | End: 2023-07-23
Attending: STUDENT IN AN ORGANIZED HEALTH CARE EDUCATION/TRAINING PROGRAM
Payer: MEDICARE

## 2023-07-23 PROBLEM — E87.0 HYPERNATREMIA: Status: ACTIVE | Noted: 2023-07-23

## 2023-07-23 LAB
ALBUMIN SERPL BCP-MCNC: 2.9 G/DL (ref 3.2–4.9)
ALBUMIN/GLOB SERPL: 0.9 G/DL
ALP SERPL-CCNC: 289 U/L (ref 30–99)
ALT SERPL-CCNC: 292 U/L (ref 2–50)
ANION GAP SERPL CALC-SCNC: 7 MMOL/L (ref 7–16)
AST SERPL-CCNC: 175 U/L (ref 12–45)
BILIRUB SERPL-MCNC: 0.8 MG/DL (ref 0.1–1.5)
BUN SERPL-MCNC: 44 MG/DL (ref 8–22)
CALCIUM ALBUM COR SERPL-MCNC: 10.3 MG/DL (ref 8.5–10.5)
CALCIUM SERPL-MCNC: 9.4 MG/DL (ref 8.5–10.5)
CHLORIDE SERPL-SCNC: 113 MMOL/L (ref 96–112)
CO2 SERPL-SCNC: 28 MMOL/L (ref 20–33)
CREAT SERPL-MCNC: 1.22 MG/DL (ref 0.5–1.4)
ERYTHROCYTE [DISTWIDTH] IN BLOOD BY AUTOMATED COUNT: 44.3 FL (ref 35.9–50)
GFR SERPLBLD CREATININE-BSD FMLA CKD-EPI: 62 ML/MIN/1.73 M 2
GLOBULIN SER CALC-MCNC: 3.4 G/DL (ref 1.9–3.5)
GLUCOSE SERPL-MCNC: 160 MG/DL (ref 65–99)
HAV IGM SERPL QL IA: NORMAL
HBV CORE IGM SER QL: NORMAL
HBV SURFACE AG SER QL: NORMAL
HCT VFR BLD AUTO: 45.6 % (ref 42–52)
HCV AB SER QL: NORMAL
HGB BLD-MCNC: 15 G/DL (ref 14–18)
MAGNESIUM SERPL-MCNC: 2.5 MG/DL (ref 1.5–2.5)
MCH RBC QN AUTO: 30.1 PG (ref 27–33)
MCHC RBC AUTO-ENTMCNC: 32.9 G/DL (ref 32.3–36.5)
MCV RBC AUTO: 91.4 FL (ref 81.4–97.8)
PHOSPHATE SERPL-MCNC: 3.6 MG/DL (ref 2.5–4.5)
PLATELET # BLD AUTO: 241 K/UL (ref 164–446)
PMV BLD AUTO: 10.2 FL (ref 9–12.9)
POTASSIUM SERPL-SCNC: 4.1 MMOL/L (ref 3.6–5.5)
PROT SERPL-MCNC: 6.3 G/DL (ref 6–8.2)
RBC # BLD AUTO: 4.99 M/UL (ref 4.7–6.1)
SODIUM SERPL-SCNC: 148 MMOL/L (ref 135–145)
WBC # BLD AUTO: 10.9 K/UL (ref 4.8–10.8)

## 2023-07-23 PROCEDURE — 770001 HCHG ROOM/CARE - MED/SURG/GYN PRIV*

## 2023-07-23 PROCEDURE — 85027 COMPLETE CBC AUTOMATED: CPT

## 2023-07-23 PROCEDURE — 76705 ECHO EXAM OF ABDOMEN: CPT

## 2023-07-23 PROCEDURE — 80053 COMPREHEN METABOLIC PANEL: CPT

## 2023-07-23 PROCEDURE — 80074 ACUTE HEPATITIS PANEL: CPT

## 2023-07-23 PROCEDURE — A9270 NON-COVERED ITEM OR SERVICE: HCPCS | Performed by: STUDENT IN AN ORGANIZED HEALTH CARE EDUCATION/TRAINING PROGRAM

## 2023-07-23 PROCEDURE — 700105 HCHG RX REV CODE 258: Performed by: STUDENT IN AN ORGANIZED HEALTH CARE EDUCATION/TRAINING PROGRAM

## 2023-07-23 PROCEDURE — 36415 COLL VENOUS BLD VENIPUNCTURE: CPT

## 2023-07-23 PROCEDURE — 83735 ASSAY OF MAGNESIUM: CPT

## 2023-07-23 PROCEDURE — 84100 ASSAY OF PHOSPHORUS: CPT

## 2023-07-23 PROCEDURE — 99233 SBSQ HOSP IP/OBS HIGH 50: CPT | Performed by: STUDENT IN AN ORGANIZED HEALTH CARE EDUCATION/TRAINING PROGRAM

## 2023-07-23 PROCEDURE — 700102 HCHG RX REV CODE 250 W/ 637 OVERRIDE(OP): Performed by: INTERNAL MEDICINE

## 2023-07-23 PROCEDURE — 700102 HCHG RX REV CODE 250 W/ 637 OVERRIDE(OP): Performed by: STUDENT IN AN ORGANIZED HEALTH CARE EDUCATION/TRAINING PROGRAM

## 2023-07-23 PROCEDURE — 700111 HCHG RX REV CODE 636 W/ 250 OVERRIDE (IP): Mod: JZ | Performed by: PSYCHIATRY & NEUROLOGY

## 2023-07-23 PROCEDURE — A9270 NON-COVERED ITEM OR SERVICE: HCPCS | Performed by: INTERNAL MEDICINE

## 2023-07-23 PROCEDURE — 700111 HCHG RX REV CODE 636 W/ 250 OVERRIDE (IP): Mod: JZ | Performed by: STUDENT IN AN ORGANIZED HEALTH CARE EDUCATION/TRAINING PROGRAM

## 2023-07-23 PROCEDURE — 99232 SBSQ HOSP IP/OBS MODERATE 35: CPT | Performed by: INTERNAL MEDICINE

## 2023-07-23 RX ADMIN — PIPERACILLIN AND TAZOBACTAM 3.38 G: 3; .375 INJECTION, POWDER, FOR SOLUTION INTRAVENOUS at 04:21

## 2023-07-23 RX ADMIN — MIDODRINE HYDROCHLORIDE 5 MG: 5 TABLET ORAL at 17:20

## 2023-07-23 RX ADMIN — MIDODRINE HYDROCHLORIDE 5 MG: 5 TABLET ORAL at 11:45

## 2023-07-23 RX ADMIN — PIPERACILLIN AND TAZOBACTAM 3.38 G: 3; .375 INJECTION, POWDER, FOR SOLUTION INTRAVENOUS at 21:19

## 2023-07-23 RX ADMIN — MIDODRINE HYDROCHLORIDE 5 MG: 5 TABLET ORAL at 07:55

## 2023-07-23 RX ADMIN — SODIUM CHLORIDE, POTASSIUM CHLORIDE, SODIUM LACTATE AND CALCIUM CHLORIDE: 600; 310; 30; 20 INJECTION, SOLUTION INTRAVENOUS at 21:23

## 2023-07-23 RX ADMIN — PIPERACILLIN AND TAZOBACTAM 3.38 G: 3; .375 INJECTION, POWDER, FOR SOLUTION INTRAVENOUS at 13:16

## 2023-07-23 RX ADMIN — ENOXAPARIN SODIUM 40 MG: 100 INJECTION SUBCUTANEOUS at 17:20

## 2023-07-23 RX ADMIN — ASPIRIN 81 MG 81 MG: 81 TABLET ORAL at 17:20

## 2023-07-23 ASSESSMENT — ENCOUNTER SYMPTOMS
LOSS OF CONSCIOUSNESS: 0
DIZZINESS: 0
SORE THROAT: 0
BLURRED VISION: 0
PALPITATIONS: 0
DIARRHEA: 0
VOMITING: 0
BACK PAIN: 0
CHILLS: 0
DOUBLE VISION: 0
HEADACHES: 0
ABDOMINAL PAIN: 0
COUGH: 0
SENSORY CHANGE: 1
SHORTNESS OF BREATH: 0
FEVER: 0
NAUSEA: 0
FOCAL WEAKNESS: 1

## 2023-07-23 ASSESSMENT — PAIN DESCRIPTION - PAIN TYPE
TYPE: ACUTE PAIN
TYPE: ACUTE PAIN
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
## 2023-07-23 NOTE — PROGRESS NOTES
Patient: : Niyah Collins  MRN: [id number]    GI team saw the patient at the bedside.  We discussed the plan of PEG tube with patient's daughter and son at bedside.    Please n.p.o. the patient after midnight.  Please have enema if the patient continues to have no bowel movement today for the safety of PEG tube tomorrow.    All questions from the family are answered.    Labs:  Recent Labs     07/21/23  0548 07/22/23  0532 07/23/23  0813   WBC 11.6* 12.0* 10.9*   RBC 5.95 5.50 4.99   HEMOGLOBIN 18.1* 16.4 15.0   HEMATOCRIT 50.8 48.1 45.6   MCV 85.4 87.5 91.4   MCH 30.4 29.8 30.1   MCHC 35.6 34.1 32.9   RDW 39.8 41.7 44.3   PLATELETCT 265 255 241   MPV 10.0 10.3 10.2     Recent Labs     07/21/23  0548 07/22/23  0532 07/23/23  0813   SODIUM 141 145 148*   POTASSIUM 4.3 4.3 4.1   CHLORIDE 102 107 113*   CO2 24 25 28   GLUCOSE 137* 172* 160*   BUN 57* 58* 44*           Recent Labs     07/22/23  0532 07/23/23  0813   ASTSGOT 178* 175*   ALTSGPT 323* 292*   TBILIRUBIN 1.1 0.8   ALKPHOSPHAT 267* 289*   GLOBULIN 3.6* 3.4

## 2023-07-23 NOTE — CARE PLAN
The patient is Stable - Low risk of patient condition declining or worsening    Shift Goals  Clinical Goals: Safety/maintain skin integrity  Patient Goals: BRENDAN  Family Goals: updates    Progress made toward(s) clinical / shift goals:    Problem: Fall Risk  Goal: Patient will remain free from falls  Outcome: Progressing   Fall precautions in place. Call light, bedside table, and pt belonging within reach.    Problem: Skin Integrity  Goal: Skin integrity is maintained or improved  Outcome: Progressing    Pt repositioned every 2 hours and as needed. Pt checked for incontinence during rounding.    Patient is not progressing towards the following goals: N/A

## 2023-07-23 NOTE — CARE PLAN
The patient is Watcher - Medium risk of patient condition declining or worsening    Shift Goals  Clinical Goals: Tube feedin  Problem: Psychosocial - Patient Condition  Goal: Patient's ability to verbalize feelings about condition will improve  Outcome: Not Progressing  Note: Pt lethargic, no signs of education      Problem: Neuro Status  Goal: Neuro status will remain stable or improve  Outcome: Not Progressing  Note: Pt right side still weak, signs of stroke     Problem: Optimal Care of the Stroke Patient  Goal: Optimal acute care for the stroke patient  Note: Followed stroke protocols     Problem: Knowledge Deficit - Stroke Education  Goal: Patient's knowledge of stroke and risk factors will improve  Note: Pt lethargic, not answering questions   g, place ann, orient  Patient Goals: BRENDAN  Family Goals: updates    Progress made toward(s) clinical / shift goals:    Problem: Psychosocial - Patient Condition  Goal: Patient's ability to verbalize feelings about condition will improve  Outcome: Not Progressing  Note: Pt lethargic, no signs of education      Problem: Neuro Status  Goal: Neuro status will remain stable or improve  Outcome: Not Progressing  Note: Pt right side still weak, signs of stroke     Problem: Optimal Care of the Stroke Patient  Goal: Optimal acute care for the stroke patient  Note: Followed stroke protocols     Problem: Knowledge Deficit - Stroke Education  Goal: Patient's knowledge of stroke and risk factors will improve  Note: Pt lethargic, not answering questions       Patient is not progressing towards the following goals:      Problem: Psychosocial - Patient Condition  Goal: Patient's ability to verbalize feelings about condition will improve  Outcome: Not Progressing  Note: Pt lethargic, no signs of education      Problem: Neuro Status  Goal: Neuro status will remain stable or improve  Outcome: Not Progressing  Note: Pt right side still weak, signs of stroke

## 2023-07-24 ENCOUNTER — ANESTHESIA (OUTPATIENT)
Dept: SURGERY | Facility: MEDICAL CENTER | Age: 78
DRG: 023 | End: 2023-07-24
Payer: MEDICARE

## 2023-07-24 ENCOUNTER — ANESTHESIA EVENT (OUTPATIENT)
Dept: SURGERY | Facility: MEDICAL CENTER | Age: 78
DRG: 023 | End: 2023-07-24
Payer: MEDICARE

## 2023-07-24 LAB
ALBUMIN SERPL BCP-MCNC: 2.9 G/DL (ref 3.2–4.9)
ALBUMIN/GLOB SERPL: 0.9 G/DL
ALP SERPL-CCNC: 286 U/L (ref 30–99)
ALT SERPL-CCNC: 280 U/L (ref 2–50)
ANION GAP SERPL CALC-SCNC: 7 MMOL/L (ref 7–16)
AST SERPL-CCNC: 165 U/L (ref 12–45)
BILIRUB SERPL-MCNC: 1.1 MG/DL (ref 0.1–1.5)
BUN SERPL-MCNC: 36 MG/DL (ref 8–22)
CALCIUM ALBUM COR SERPL-MCNC: 10.1 MG/DL (ref 8.5–10.5)
CALCIUM SERPL-MCNC: 9.2 MG/DL (ref 8.5–10.5)
CHLORIDE SERPL-SCNC: 114 MMOL/L (ref 96–112)
CO2 SERPL-SCNC: 26 MMOL/L (ref 20–33)
CREAT SERPL-MCNC: 1.13 MG/DL (ref 0.5–1.4)
CRP SERPL HS-MCNC: 6.27 MG/DL (ref 0–0.75)
ERYTHROCYTE [DISTWIDTH] IN BLOOD BY AUTOMATED COUNT: 42.9 FL (ref 35.9–50)
GFR SERPLBLD CREATININE-BSD FMLA CKD-EPI: 67 ML/MIN/1.73 M 2
GLOBULIN SER CALC-MCNC: 3.3 G/DL (ref 1.9–3.5)
GLUCOSE SERPL-MCNC: 100 MG/DL (ref 65–99)
HCT VFR BLD AUTO: 44.5 % (ref 42–52)
HGB BLD-MCNC: 14.3 G/DL (ref 14–18)
MAGNESIUM SERPL-MCNC: 2.4 MG/DL (ref 1.5–2.5)
MCH RBC QN AUTO: 29.5 PG (ref 27–33)
MCHC RBC AUTO-ENTMCNC: 32.1 G/DL (ref 32.3–36.5)
MCV RBC AUTO: 91.8 FL (ref 81.4–97.8)
PHOSPHATE SERPL-MCNC: 3.8 MG/DL (ref 2.5–4.5)
PLATELET # BLD AUTO: 233 K/UL (ref 164–446)
PMV BLD AUTO: 10 FL (ref 9–12.9)
POTASSIUM SERPL-SCNC: 3.8 MMOL/L (ref 3.6–5.5)
PREALB SERPL-MCNC: 12.1 MG/DL (ref 18–38)
PROCALCITONIN SERPL-MCNC: 0.19 NG/ML
PROT SERPL-MCNC: 6.2 G/DL (ref 6–8.2)
RBC # BLD AUTO: 4.85 M/UL (ref 4.7–6.1)
SODIUM SERPL-SCNC: 147 MMOL/L (ref 135–145)
WBC # BLD AUTO: 9 K/UL (ref 4.8–10.8)

## 2023-07-24 PROCEDURE — 700111 HCHG RX REV CODE 636 W/ 250 OVERRIDE (IP): Mod: JZ | Performed by: STUDENT IN AN ORGANIZED HEALTH CARE EDUCATION/TRAINING PROGRAM

## 2023-07-24 PROCEDURE — 160002 HCHG RECOVERY MINUTES (STAT): Performed by: SPECIALIST

## 2023-07-24 PROCEDURE — 84145 PROCALCITONIN (PCT): CPT

## 2023-07-24 PROCEDURE — 110372 HCHG SHELL REV 278: Performed by: SPECIALIST

## 2023-07-24 PROCEDURE — 43246 EGD PLACE GASTROSTOMY TUBE: CPT | Performed by: SPECIALIST

## 2023-07-24 PROCEDURE — 80053 COMPREHEN METABOLIC PANEL: CPT

## 2023-07-24 PROCEDURE — 86140 C-REACTIVE PROTEIN: CPT

## 2023-07-24 PROCEDURE — 700102 HCHG RX REV CODE 250 W/ 637 OVERRIDE(OP): Performed by: STUDENT IN AN ORGANIZED HEALTH CARE EDUCATION/TRAINING PROGRAM

## 2023-07-24 PROCEDURE — 160207 HCHG ENDO MINUTES - EA ADDL 1 MIN LEVEL 3: Performed by: SPECIALIST

## 2023-07-24 PROCEDURE — 97535 SELF CARE MNGMENT TRAINING: CPT

## 2023-07-24 PROCEDURE — 160202 HCHG ENDO MINUTES - 1ST 30 MINS LEVEL 3: Performed by: SPECIALIST

## 2023-07-24 PROCEDURE — 85027 COMPLETE CBC AUTOMATED: CPT

## 2023-07-24 PROCEDURE — 97530 THERAPEUTIC ACTIVITIES: CPT

## 2023-07-24 PROCEDURE — 99233 SBSQ HOSP IP/OBS HIGH 50: CPT | Performed by: STUDENT IN AN ORGANIZED HEALTH CARE EDUCATION/TRAINING PROGRAM

## 2023-07-24 PROCEDURE — 770001 HCHG ROOM/CARE - MED/SURG/GYN PRIV*

## 2023-07-24 PROCEDURE — 99100 ANES PT EXTEME AGE<1 YR&>70: CPT | Performed by: ANESTHESIOLOGY

## 2023-07-24 PROCEDURE — 160048 HCHG OR STATISTICAL LEVEL 1-5: Performed by: SPECIALIST

## 2023-07-24 PROCEDURE — 700111 HCHG RX REV CODE 636 W/ 250 OVERRIDE (IP): Mod: JZ | Performed by: ANESTHESIOLOGY

## 2023-07-24 PROCEDURE — A9270 NON-COVERED ITEM OR SERVICE: HCPCS | Performed by: STUDENT IN AN ORGANIZED HEALTH CARE EDUCATION/TRAINING PROGRAM

## 2023-07-24 PROCEDURE — 700111 HCHG RX REV CODE 636 W/ 250 OVERRIDE (IP): Mod: JZ | Performed by: PSYCHIATRY & NEUROLOGY

## 2023-07-24 PROCEDURE — 160035 HCHG PACU - 1ST 60 MINS PHASE I: Performed by: SPECIALIST

## 2023-07-24 PROCEDURE — 160009 HCHG ANES TIME/MIN: Performed by: SPECIALIST

## 2023-07-24 PROCEDURE — 83735 ASSAY OF MAGNESIUM: CPT

## 2023-07-24 PROCEDURE — 36415 COLL VENOUS BLD VENIPUNCTURE: CPT

## 2023-07-24 PROCEDURE — 0DJ08ZZ INSPECTION OF UPPER INTESTINAL TRACT, VIA NATURAL OR ARTIFICIAL OPENING ENDOSCOPIC: ICD-10-PCS | Performed by: SPECIALIST

## 2023-07-24 PROCEDURE — 00731 ANES UPR GI NDSC PX NOS: CPT | Performed by: ANESTHESIOLOGY

## 2023-07-24 PROCEDURE — 0DH63UZ INSERTION OF FEEDING DEVICE INTO STOMACH, PERCUTANEOUS APPROACH: ICD-10-PCS | Performed by: SPECIALIST

## 2023-07-24 PROCEDURE — 700105 HCHG RX REV CODE 258: Performed by: STUDENT IN AN ORGANIZED HEALTH CARE EDUCATION/TRAINING PROGRAM

## 2023-07-24 PROCEDURE — 84134 ASSAY OF PREALBUMIN: CPT

## 2023-07-24 PROCEDURE — 700102 HCHG RX REV CODE 250 W/ 637 OVERRIDE(OP): Performed by: INTERNAL MEDICINE

## 2023-07-24 PROCEDURE — A9270 NON-COVERED ITEM OR SERVICE: HCPCS | Performed by: INTERNAL MEDICINE

## 2023-07-24 PROCEDURE — 84100 ASSAY OF PHOSPHORUS: CPT

## 2023-07-24 PROCEDURE — 97112 NEUROMUSCULAR REEDUCATION: CPT

## 2023-07-24 DEVICE — KIT 20 FRENCH PULL SAFETY PEG: Type: IMPLANTABLE DEVICE | Site: ESOPHAGUS | Status: FUNCTIONAL

## 2023-07-24 RX ORDER — MIDAZOLAM HYDROCHLORIDE 1 MG/ML
INJECTION INTRAMUSCULAR; INTRAVENOUS PRN
Status: DISCONTINUED | OUTPATIENT
Start: 2023-07-24 | End: 2023-07-24 | Stop reason: SURG

## 2023-07-24 RX ORDER — DIPHENHYDRAMINE HYDROCHLORIDE 50 MG/ML
12.5 INJECTION INTRAMUSCULAR; INTRAVENOUS
Status: DISCONTINUED | OUTPATIENT
Start: 2023-07-24 | End: 2023-07-24 | Stop reason: HOSPADM

## 2023-07-24 RX ORDER — OXYCODONE HCL 5 MG/5 ML
10 SOLUTION, ORAL ORAL
Status: DISCONTINUED | OUTPATIENT
Start: 2023-07-24 | End: 2023-07-24 | Stop reason: HOSPADM

## 2023-07-24 RX ORDER — HYDROMORPHONE HYDROCHLORIDE 1 MG/ML
0.1 INJECTION, SOLUTION INTRAMUSCULAR; INTRAVENOUS; SUBCUTANEOUS
Status: DISCONTINUED | OUTPATIENT
Start: 2023-07-24 | End: 2023-07-24 | Stop reason: HOSPADM

## 2023-07-24 RX ORDER — HYDROMORPHONE HYDROCHLORIDE 1 MG/ML
0.2 INJECTION, SOLUTION INTRAMUSCULAR; INTRAVENOUS; SUBCUTANEOUS
Status: DISCONTINUED | OUTPATIENT
Start: 2023-07-24 | End: 2023-07-24 | Stop reason: HOSPADM

## 2023-07-24 RX ORDER — HYDROMORPHONE HYDROCHLORIDE 1 MG/ML
0.4 INJECTION, SOLUTION INTRAMUSCULAR; INTRAVENOUS; SUBCUTANEOUS
Status: DISCONTINUED | OUTPATIENT
Start: 2023-07-24 | End: 2023-07-24 | Stop reason: HOSPADM

## 2023-07-24 RX ORDER — CLOPIDOGREL BISULFATE 75 MG/1
75 TABLET ORAL DAILY
Status: DISCONTINUED | OUTPATIENT
Start: 2023-07-27 | End: 2023-07-25

## 2023-07-24 RX ORDER — SODIUM CHLORIDE, SODIUM LACTATE, POTASSIUM CHLORIDE, CALCIUM CHLORIDE 600; 310; 30; 20 MG/100ML; MG/100ML; MG/100ML; MG/100ML
INJECTION, SOLUTION INTRAVENOUS CONTINUOUS
Status: DISCONTINUED | OUTPATIENT
Start: 2023-07-24 | End: 2023-07-24 | Stop reason: HOSPADM

## 2023-07-24 RX ORDER — CEFAZOLIN SODIUM 1 G/3ML
INJECTION, POWDER, FOR SOLUTION INTRAMUSCULAR; INTRAVENOUS PRN
Status: DISCONTINUED | OUTPATIENT
Start: 2023-07-24 | End: 2023-07-24 | Stop reason: SURG

## 2023-07-24 RX ORDER — OXYCODONE HCL 5 MG/5 ML
5 SOLUTION, ORAL ORAL
Status: DISCONTINUED | OUTPATIENT
Start: 2023-07-24 | End: 2023-07-24 | Stop reason: HOSPADM

## 2023-07-24 RX ORDER — ONDANSETRON 2 MG/ML
4 INJECTION INTRAMUSCULAR; INTRAVENOUS
Status: DISCONTINUED | OUTPATIENT
Start: 2023-07-24 | End: 2023-07-24 | Stop reason: HOSPADM

## 2023-07-24 RX ADMIN — MIDODRINE HYDROCHLORIDE 5 MG: 5 TABLET ORAL at 21:09

## 2023-07-24 RX ADMIN — MIDAZOLAM 2 MG: 1 INJECTION, SOLUTION INTRAMUSCULAR; INTRAVENOUS at 12:23

## 2023-07-24 RX ADMIN — FENTANYL CITRATE 100 MCG: 50 INJECTION, SOLUTION INTRAMUSCULAR; INTRAVENOUS at 12:23

## 2023-07-24 RX ADMIN — ENOXAPARIN SODIUM 40 MG: 100 INJECTION SUBCUTANEOUS at 17:49

## 2023-07-24 RX ADMIN — PIPERACILLIN AND TAZOBACTAM 3.38 G: 3; .375 INJECTION, POWDER, FOR SOLUTION INTRAVENOUS at 21:18

## 2023-07-24 RX ADMIN — PROPOFOL 25 MG: 10 INJECTION, EMULSION INTRAVENOUS at 12:48

## 2023-07-24 RX ADMIN — ASPIRIN 300 MG: 300 SUPPOSITORY RECTAL at 17:49

## 2023-07-24 RX ADMIN — SODIUM CHLORIDE, POTASSIUM CHLORIDE, SODIUM LACTATE AND CALCIUM CHLORIDE: 600; 310; 30; 20 INJECTION, SOLUTION INTRAVENOUS at 08:18

## 2023-07-24 RX ADMIN — PROPOFOL 25 MG: 10 INJECTION, EMULSION INTRAVENOUS at 12:29

## 2023-07-24 RX ADMIN — PROPOFOL 25 MG: 10 INJECTION, EMULSION INTRAVENOUS at 12:39

## 2023-07-24 RX ADMIN — PIPERACILLIN AND TAZOBACTAM 3.38 G: 3; .375 INJECTION, POWDER, FOR SOLUTION INTRAVENOUS at 04:11

## 2023-07-24 RX ADMIN — CEFAZOLIN 2 G: 1 INJECTION, POWDER, FOR SOLUTION INTRAMUSCULAR; INTRAVENOUS at 12:22

## 2023-07-24 RX ADMIN — PROPOFOL 25 MG: 10 INJECTION, EMULSION INTRAVENOUS at 12:26

## 2023-07-24 ASSESSMENT — PAIN DESCRIPTION - PAIN TYPE
TYPE: OTHER (COMMENT)
TYPE: SURGICAL PAIN

## 2023-07-24 ASSESSMENT — COGNITIVE AND FUNCTIONAL STATUS - GENERAL
HELP NEEDED FOR BATHING: A LOT
SUGGESTED CMS G CODE MODIFIER DAILY ACTIVITY: CL
DAILY ACTIVITIY SCORE: 10
MOBILITY SCORE: 7
DRESSING REGULAR LOWER BODY CLOTHING: A LOT
TURNING FROM BACK TO SIDE WHILE IN FLAT BAD: UNABLE
DRESSING REGULAR UPPER BODY CLOTHING: A LOT
SUGGESTED CMS G CODE MODIFIER MOBILITY: CM
STANDING UP FROM CHAIR USING ARMS: A LOT
MOVING TO AND FROM BED TO CHAIR: UNABLE
WALKING IN HOSPITAL ROOM: TOTAL
EATING MEALS: TOTAL
TOILETING: TOTAL
CLIMB 3 TO 5 STEPS WITH RAILING: TOTAL
PERSONAL GROOMING: A LOT
MOVING FROM LYING ON BACK TO SITTING ON SIDE OF FLAT BED: UNABLE

## 2023-07-24 ASSESSMENT — FIBROSIS 4 INDEX: FIB4 SCORE: 3.17

## 2023-07-24 ASSESSMENT — ENCOUNTER SYMPTOMS
COUGH: 0
DIARRHEA: 0
NAUSEA: 0
DOUBLE VISION: 0
ABDOMINAL PAIN: 0
SENSORY CHANGE: 1
SHORTNESS OF BREATH: 0
DIZZINESS: 0
FOCAL WEAKNESS: 1
LOSS OF CONSCIOUSNESS: 0
VOMITING: 0
FEVER: 0
BLURRED VISION: 0
HEADACHES: 0
PALPITATIONS: 0
SORE THROAT: 0
CHILLS: 0
BACK PAIN: 0

## 2023-07-24 ASSESSMENT — GAIT ASSESSMENTS: GAIT LEVEL OF ASSIST: UNABLE TO PARTICIPATE

## 2023-07-24 ASSESSMENT — PAIN SCALES - GENERAL: PAIN_LEVEL: 0

## 2023-07-24 NOTE — ANESTHESIA PREPROCEDURE EVALUATION
Case: [id number] Date/Time: 07/24/23 1217    Procedure: EGD, WITH PEG TUBE INSERTION    Anesthesia type: General    Location: [facility] ROOM 26 / SURGERY SAME DAY [facility]    Surgeons: Brenda Garcia M.D.          Relevant Problems   PULMONARY   (positive) Pneumonia      NEURO   (positive) Acute ischemic left MCA stroke (HCC)      CARDIAC   (positive) Hypertensive urgency   (positive) Left carotid artery stenosis         (positive) SUHAS (acute kidney injury) (HCC)       Physical Exam    Airway   Mallampati: II  TM distance: >3 FB  Neck ROM: full       Cardiovascular - normal exam  Rhythm: regular  Rate: normal  (-) murmur     Dental - normal exam        Very poor dentition   Pulmonary - normal exam  (+) rhonchi     Abdominal    Neurological - normal exam                 Anesthesia Plan    ASA 4   ASA physical status 4 criteria: CVA or TIA - recent (< 3 months)    Plan - MAC         (Spoke personally by phone with Pramod who is son and DPOA. All risks explained and wishes to proceed)      Induction: intravenous    Postoperative Plan: Postoperative administration of opioids is intended.    Pertinent diagnostic labs and testing reviewed    Informed Consent:    Anesthetic plan and risks discussed with legal guardian.

## 2023-07-24 NOTE — PROGRESS NOTES
4 Eyes Skin Assessment Completed by ERICKSON roldan and ERICKSON segura.    Head WDL  Ears Redness and Blanching  Nose WDL  Mouth WDL missing teeth/ discolored tongue   Neck WDL  Breast/Chest WDL  Shoulder Blades Redness and Blanching  Spine Redness and Blanching  (R) Arm/Elbow/Hand Redness and Blanching  (L) Arm/Elbow/Hand Redness and Blanching  Abdomen WDL. PEG inserted   Groin WDL  Scrotum/Coccyx/Buttocks Redness and Blanching  (R) Leg WDL  (L) Leg WDL  (R) Heel/Foot/Toe WDL  (L) Heel/Foot/Toe WDL          Devices In Places Blood Pressure Cuff, Pulse Ox, Ellison, and SCD's      Interventions In Place Heel Mepilex, Heel Float Boots, and Elbow Mepilex    Possible Skin Injury No    Pictures Uploaded Into Epic N/A  Wound Consult Placed N/A  RN Wound Prevention Protocol Ordered yes

## 2023-07-24 NOTE — PROCEDURES
PATIENT: Niyah Collins  1945      Location : [facility]    PREOPERATIVE DIAGNOSIS/INDICATION: CVA, dysphagia    POSTOPERATIVE DIAGNOSES: Successful PEG placement, esophagitis    PROCEDURE:  EGD WITH PEG placement.    SURGEON:  Brenda Garcia MD    ANESTHESIA:  Per Anesthesia team.     CONSENT:  After confirming the patient's identity, the procedure was explained in detail to the patient. Risks of the procedure including but not limited to bleeding, infection, perforation, sedation risks and risks of missed lesions were explained to the patient. Patient has signed informed consent.    DESCRIPTION:  The patient presented to the procedure room.  After routine checkup was performed, the patient was brought into the endoscopy suite, placed in the left lateral decubitus position and was sedated by anesthesia.  Vital signs were monitored throughout the procedure.  Oxygenation support was provided throughout the procedure. Upper endoscope was inserted into patient's mouth and advanced toward the second portion of the duodenum under direct visualization.  Once the site was reached and examined, the upper endoscope was withdrawn.  Retroflexion was made within the gastric cardia.  The patient tolerated the procedure well.  There were no immediate complications.    PEG placement: An appropriate site was selected and the stomach was trans-illuminated and an optimal position for the PEG tube was identified using good 1:1 transmission method. The skin was infiltrated with local anesthetic and the trocar and sheath were inserted through the abdomen into the stomach under direct visualization. The needle was removed and a guidewire was inserted through the sheath. The guidewire was grasped within the stomach with a snare. It was removed completely out of the mouth and the PEG tube was secured to the guidewire.    The guidewire and PEG tube were then pulled through the mouth and esophagus and snug to the abdominal wall.The  external bumper fixer was at 2 cm at the level of the skin. There was no evidence of bleeding. Gastroscope was reintroduced to look for adequate positioning of the PEG and for complications. No complications or bleeding seen. The Bolster was placed on the PEG site. The patient tolerated the procedure well and was transferred to recovery room in stable condition    Medications given: Ancef 2 gram IVPB for prophylaxis      RECOMMENDATIONS:    1. PEG placed without complication.  2. Significant esophagitis - PPI, the stomach and duodenum are normal    Please ensure proper PEG care and tube feeding:  - keep head of bed elevated to at least 30 degrees during tube feeding. The largest factor in preventing aspiration during tube feeding is not the placement of the tube into the stomach or jejunum, but keeping the head elevated to at least 30 degrees during feeding  - Medications and water can be started 6 hours after placement. Tube feeds can be started tomorrow morning.   - PEG needs 0.5-1cm of laxity freely mobile in and out of the gastrostomy tract. Overtightening may impair tract maturation by inducing localized ischemia. Over tightening may also result in buried bumper syndrome.      2. No NSAIDS for 7 days. For full dose anti-coagulation, please hold 24 hours after the procedure. Anti-platelet per the primary/cardiology team.

## 2023-07-24 NOTE — OR NURSING
1257 Pt to PACU from OR. Report from anesthesia and OR RN. On 6L O2 via mask. Respirations even and unlabored. VSS. Dressing CDI. Pt shakes head no when RN asks if patient is having any pain.    1308 Report called to ERICKSON Green. All questions and concerns addressed at this time.    1347 [place] [facility] [place] with all belongings. O2 tank full.

## 2023-07-24 NOTE — THERAPY
Physical Therapy   Daily Treatment     Patient Name: Niyah Collins  Age:  77 y.o., Sex:  male  Medical Record #: [id number]  Today's Date: 7/24/2023     Precautions: Fall Risk;Nasogastric Tube;Swallow Precautions  Comments: L gaze preference, R hemiplegia    Assessment    Pt now with decreased tone throughout R side. He conts to require mod A for mob. Worked on upright midline control EOB. Improved postural control after L wt shifting due to frequent R lateral lean in sitting. Ongoing L gaze preference with R inattention. Attempted STS x3 however pt with poor RLE extension requiring mod>max A. Deferred transfer to chair today 2/2 quick fatigue. Pt pending PEG later this am. PT cont to follow.     Plan    Treatment Plan Status: Continue Current Treatment Plan  Type of Treatment: Bed Mobility, Gait Training, Neuro Re-Education / Balance, Self Care / Home Evaluation, Therapeutic Activities  Treatment Frequency: 4 Times per Week  Treatment Duration: Until Therapy Goals Met    DC Equipment Recommendations: Unable to determine at this time  Discharge Recommendations: Recommend post-acute placement for additional physical therapy services prior to discharge home     Objective    Cognition    Cognition / Consciousness X   Speech/ Communication Dysarthric   Level of Consciousness   (Lethargic)   Ability To Follow Commands 1 Step   Safety Awareness Impaired   Attention Impaired   Comments ongoing R inattention, cues to attend with quick fatigue   Passive ROM Lower Body   Passive ROM Lower Body WDL   Active ROM Lower Body    Active ROM Lower Body  X   Comments grossly limited by flaccidity   Strength Lower Body   Comments no volitional mvt throughout RLE during mob   Lower Body Muscle Tone   Modified Aniket Scale Right Lower Extremity 0   Neuro-Muscular Treatments   Neuro-Muscular Treatments Anterior weight shift;Joint Approximation;Facilitation;Postural Changes;Postural Facilitation;Sensory Stimuli;Tactile  Cuing;Sequencing;Verbal Cuing;Weight Shift Left   Neurological Concerns   Sitting Posture During ADL's Posterior Lean;Lateral Lean Right   Balance   Sitting Balance (Static) Poor +   Sitting Balance (Dynamic) Poor -   Standing Balance (Static) Trace   Standing Balance (Dynamic) Trace   Weight Shift Sitting Poor   Weight Shift Standing Absent   Skilled Intervention Verbal Cuing;Sequencing   Comments improved midline after L wt shifting in sitting, unable to achieve full standing with support on R side   Bed Mobility    Supine to Sit Minimal Assist   Sit to Supine Moderate Assist   Scooting Minimal Assist   Rolling Minimal Assist to Rt.;Moderate Assist to Lt.   Skilled Intervention Verbal Cuing;Sequencing;Facilitation   Gait Analysis   Gait Level Of Assist Unable to Participate   Functional Mobility   Sit to Stand Moderate Assist  (to max A with fatigue)   Bed, Chair, Wheelchair Transfer Unable to Participate   Skilled Intervention Verbal Cuing;Sequencing;Postural Facilitation   How much difficulty does the patient currently have...   Turning over in bed (including adjusting bedclothes, sheets and blankets)? 1   Sitting down on and standing up from a chair with arms (e.g., wheelchair, bedside commode, etc.) 1   Moving from lying on back to sitting on the side of the bed? 1   How much help from another person does the patient currently need...   Moving to and from a bed to a chair (including a wheelchair)? 2   Need to walk in a hospital room? 1   Climbing 3-5 steps with a railing? 1   6 clicks Mobility Score 7   Activity Tolerance   Sitting in Chair Deferred   Sitting Edge of Bed 15 mins   Standing attempted   Short Term Goals    Short Term Goal # 1 Pt will transfer supine<>sit with Kayli in 6 visits to improve bed mobility.   Goal Outcome # 1 goal not met   Short Term Goal # 2 Pt will maintain midline unsupported position SBA >2 minutes while EOB to improve trunk control   Goal Outcome # 2 Progressing as expected    Short Term Goal # 3 Pt will transfer stand step bed<>chair/wc CGA w/LRAD in 6 visits to improve transfer abilities.   Goal Outcome # 3 Goal not met   Short Term Goal # 4 Pt will ambulate 5' maxA w/hemiwalker in 6 visits to improve standing and walking abilities.   Goal Outcome # 4 Goal not met

## 2023-07-24 NOTE — ANESTHESIA TIME REPORT
Anesthesia Start and Stop Event Times     Date Time Event    7/24/2023 1158 Ready for Procedure     1217 Anesthesia Start     1258 Anesthesia Stop        Responsible Staff  07/24/23    Name Role Begin End    Angel South M.D. Anesth 1217 1258        Overtime Reason:  no overtime (within assigned shift)    Comments:

## 2023-07-24 NOTE — THERAPY
Occupational Therapy  Daily Treatment     Patient Name: Niyah Collins  Age:  77 y.o., Sex:  male  Medical Record #: [id number]  Today's Date: 7/24/2023     Precautions  Precautions: Fall Risk, Nasogastric Tube, Swallow Precautions  Comments: L gaze preference, R hemiplegia    Assessment    Pt seen for OT treatment. Pt required mod A to wash face seated EOB, mod A to wash R UE with L, and max A to don/doff gown. Pt required mod A for bed mobility and assist to maintain balance while EOB. Pt cued to find objects on the R side of the room due to inattention and required max cues to fully turn head/find them. Pt reported feeling fatigued prior to start of session, but was willing to participate. Pt educated regarding the role of OT, pathology of bedrest, importance of proper positioning and awareness of R UE during functional mobility, and encouraged to try finding objects on the R side of room while therapist not present. Pt current functional performance limited by impaired cognition, impaired balance, R inattention, and R hemiplegia. Pt will continue to benefit from skilled OT while admitted to acute care.     Plan    Treatment Plan Status: Continue Current Treatment Plan  Type of Treatment: Self Care / Activities of Daily Living, Adaptive Equipment, Neuro Re-Education / Balance, Therapeutic Exercises, Therapeutic Activity  Treatment Frequency: 4 Times per Week  Treatment Duration: Until Therapy Goals Met    DC Equipment Recommendations: Unable to determine at this time  Discharge Recommendations: Recommend post-acute placement for additional occupational therapy services prior to discharge home     Objective     07/24/23 1011   Pain   Pain Scales 0 to 10 Scale    Pain 0 - 10 Group   Therapist Pain Assessment Post Activity Pain Same as Prior to Activity;Nurse Notified  (no pain reported, not rated)   Non Verbal Descriptors   Non Verbal Scale  Calm   Cognition    Cognition / Consciousness X   Speech/ Communication  Dysarthric  (generally 1 word responses)   Level of Consciousness Alert   Ability To Follow Commands 1 Step   Safety Awareness Impaired   New Learning Impaired   Attention Impaired   Sequencing Impaired   Comments cooperative and motivated   Supine Upper Body Exercises   Supine Upper Body Exercises Yes   Comments Gentle PROM to R UE   Neuro-Muscular Treatments   Neuro-Muscular Treatments Anterior weight shift;Facilitation;Joint Approximation;Postural Changes;Postural Facilitation;Sensory Stimuli;Tactile Cuing;Verbal Cuing;Weight Shift Left   Other Treatments   Other Treatments Provided Had pt practice finding objects on R side of room due to inattention. Able to find objects given max vebal cues. Slight improvement by end of session. Also discussed importance of positioning/awareness of R UE during transitions to protect it due to impaired/absent sensation.   Balance   Sitting Balance (Static) Poor +   Sitting Balance (Dynamic) Poor -   Weight Shift Sitting Poor   Skilled Intervention Verbal Cuing;Sequencing;Facilitation;Postural Facilitation;Tactile Cuing   Comments Initially with strong R sided lean and anterior head positioning. Improved with weight shifts.   Bed Mobility    Supine to Sit Moderate Assist   Sit to Supine Moderate Assist   Scooting Minimal Assist   Rolling Minimal Assist to Rt.   Skilled Intervention Verbal Cuing;Facilitation;Sequencing;Tactile Cuing   Comments HOB elevated, cues for sequencing   Activities of Daily Living   Grooming Moderate Assist;Seated  (washed face)   Bathing   (cued to wash R UE using L UE, min-mod A for completion)   Upper Body Dressing Maximal Assist  (don/doff gown)   Skilled Intervention Verbal Cuing;Facilitation;Compensatory Strategies;Postural Facilitation   Comments required postural support throughout, fatigued quickly   Functional Mobility   Mobility EOB only due to fatigue   Skilled Intervention Verbal Cuing;Sequencing;Postural Facilitation;Facilitation   Visual  Perception   Visual Perception  X   Neglect Severe Right    Comments Pt noted to open R eye a few times this session when cued to look to the R   Activity Tolerance   Sitting in Chair NT, deferred   Sitting Edge of Bed ~15-20 min   Standing NT due to fatigue   Comments Pt reported fatigue prior to start of session, quickly fatigued further EOB   Patient / Family Goals   Patient / Family Goal #1 Daughter's goal: for pt to eat   Goal #1 Outcome Progressing slower than expected   Short Term Goals   Short Term Goal # 1 Pt will sit EOB with CGA for >5 min to participate in grooming task   Goal Outcome # 1 Progressing as expected   Short Term Goal # 2 Pt will complete oral care with SBA   Goal Outcome # 2 Goal not met   Short Term Goal # 3 Pt will complete gown change with mod A using morteza technique   Goal Outcome # 3 Progressing slower than expected   Short Term Goal # 4 Pt will attend to objects on R side with min verbal cues during ADL   Goal Outcome # 4 Progressing as expected   Education Group   Education Provided Role of Occupational Therapist;Activities of Daily Living;Pathology of bedrest;Joint Protection   Role of Occupational Therapist Patient Response Patient;Acceptance;Explanation;Verbal Demonstration;Reinforcement Needed   Joint Protection Patient Response Patient;Acceptance;Explanation;Demonstration;Verbal Demonstration;Reinforcement Needed   ADL Patient Response Patient;Acceptance;Explanation;Demonstration;Verbal Demonstration;Action Demonstration;Reinforcement Needed   Pathology of Bedrest Patient Response Patient;Acceptance;Explanation;Verbal Demonstration;Reinforcement Needed   Occupational Therapy Treatment Plan    O.T. Treatment Plan Continue Current Treatment Plan   Treatment Interventions Self Care / Activities of Daily Living;Adaptive Equipment;Neuro Re-Education / Balance;Therapeutic Exercises;Therapeutic Activity   Treatment Frequency 4 Times per Week   Duration Until Therapy Goals Met
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
## 2023-07-24 NOTE — DIETARY
Nutrition support weekly update:  Day 10 of admit.  Niyah Collins is a 77 y.o. male with admitting DX of acute ischemic left MCA stroke. Tube feeding initiated on 7/16. Current TF via Gastric Cortrak is Isosource 1.5 @ goal rate of 50 ml/hr which provides 1800 kcals, 82g protein, 917 ml free water daily.    Assessment:  Weight (7/24) 77.3 kg via bed scale. Weight appears up 1 kg since initial bed scale weight of 76.3 kg on 7/14.   Re-estimate of nutritional needs not indicated at this time.     Evaluation:   Tube feeding Isosource 1.5 last at goal rate of 50 ml/hr on 7/23 @ 0821 per flowsheets. Per RN, TF's have been turned off all day today.  Gastric Cortrak removed today. Pt is s/p EGD with PEG placement today. PEG currently clamped per flowsheets. Per surgery note, tube feeds can be started tomorrow morning.  Labs: sodium 147, glucose 100, Bun 36, , , alk phos 286, albumin 2.9  Meds: Zosyn, Zofran, bowel regimen, LR infusion @ 125 ml/hr  Skin: incision abdomen, no staged wounds or edema noted  GI: Last BM 7/24  Current feeding remains appropriate at this time.     Malnutrition risk: Unable to fully assess at this time.     Recommendations/Plan:  Continue continuous feeds of TF formula Isosource 1.5 @ 50 ml/hr which provides 1800 kcals, 82g protein, 917 ml free water daily  Once pt is tolerating continuous feeds via PEG, recommend to transition to bolus feeds.   Bolus feeding recommendations: Give ½ container of Isosource 1.5 via PEG at 1st feeding. Advance each feeding ½ container until goal is reached. Goal is 5 containers/day. This provides 1875 kcal (24 kcal/kg), 85 grams protein (1.1 gm/kg), and 950 ml free water per day.   Fluids per MD  Monitor weights    RD following.

## 2023-07-24 NOTE — CARE PLAN
The patient is Stable - Low risk of patient condition declining or worsening    Shift Goals  Clinical Goals: Safety/maintain skin integrity  Patient Goals: BRENDAN  Family Goals: updates    Progress made toward(s) clinical / shift goals:       Problem: Optimal Care of the Stroke Patient  Goal: Optimal emergency care for the stroke patient  Outcome: Progressing     Problem: Neuro Status  Goal: Neuro status will remain stable or improve  Outcome: Progressing     Problem: Hemodynamic Monitoring  Goal: Patient's hemodynamics, fluid balance and neurologic status will be stable or improve  Outcome: Progressing     Problem: Pain - Standard  Goal: Alleviation of pain or a reduction in pain to the patient’s comfort goal  Outcome: Progressing

## 2023-07-24 NOTE — DISCHARGE PLANNING
Agency/Facility Name: [facility]   Spoke To: Marni  Outcome: DPA called facility to ask for updated referral status. Per Mrani, facility is not filling beds at this time       LSW notified

## 2023-07-25 LAB
ALBUMIN SERPL BCP-MCNC: 2.9 G/DL (ref 3.2–4.9)
ALBUMIN/GLOB SERPL: 0.8 G/DL
ALP SERPL-CCNC: 292 U/L (ref 30–99)
ALT SERPL-CCNC: 225 U/L (ref 2–50)
ANION GAP SERPL CALC-SCNC: 9 MMOL/L (ref 7–16)
AST SERPL-CCNC: 128 U/L (ref 12–45)
BILIRUB SERPL-MCNC: 1.1 MG/DL (ref 0.1–1.5)
BUN SERPL-MCNC: 36 MG/DL (ref 8–22)
CALCIUM ALBUM COR SERPL-MCNC: 10.2 MG/DL (ref 8.5–10.5)
CALCIUM SERPL-MCNC: 9.3 MG/DL (ref 8.5–10.5)
CHLORIDE SERPL-SCNC: 115 MMOL/L (ref 96–112)
CO2 SERPL-SCNC: 24 MMOL/L (ref 20–33)
CREAT SERPL-MCNC: 1.16 MG/DL (ref 0.5–1.4)
ERYTHROCYTE [DISTWIDTH] IN BLOOD BY AUTOMATED COUNT: 41.9 FL (ref 35.9–50)
GFR SERPLBLD CREATININE-BSD FMLA CKD-EPI: 65 ML/MIN/1.73 M 2
GLOBULIN SER CALC-MCNC: 3.7 G/DL (ref 1.9–3.5)
GLUCOSE SERPL-MCNC: 108 MG/DL (ref 65–99)
HCT VFR BLD AUTO: 46.5 % (ref 42–52)
HGB BLD-MCNC: 15.7 G/DL (ref 14–18)
MAGNESIUM SERPL-MCNC: 2.5 MG/DL (ref 1.5–2.5)
MCH RBC QN AUTO: 30.3 PG (ref 27–33)
MCHC RBC AUTO-ENTMCNC: 33.8 G/DL (ref 32.3–36.5)
MCV RBC AUTO: 89.6 FL (ref 81.4–97.8)
PHOSPHATE SERPL-MCNC: 3.4 MG/DL (ref 2.5–4.5)
PLATELET # BLD AUTO: 249 K/UL (ref 164–446)
PMV BLD AUTO: 10.1 FL (ref 9–12.9)
POTASSIUM SERPL-SCNC: 4.1 MMOL/L (ref 3.6–5.5)
PROT SERPL-MCNC: 6.6 G/DL (ref 6–8.2)
RBC # BLD AUTO: 5.19 M/UL (ref 4.7–6.1)
SODIUM SERPL-SCNC: 148 MMOL/L (ref 135–145)
WBC # BLD AUTO: 10.8 K/UL (ref 4.8–10.8)

## 2023-07-25 PROCEDURE — 83735 ASSAY OF MAGNESIUM: CPT

## 2023-07-25 PROCEDURE — 700102 HCHG RX REV CODE 250 W/ 637 OVERRIDE(OP): Performed by: INTERNAL MEDICINE

## 2023-07-25 PROCEDURE — 700102 HCHG RX REV CODE 250 W/ 637 OVERRIDE(OP): Performed by: STUDENT IN AN ORGANIZED HEALTH CARE EDUCATION/TRAINING PROGRAM

## 2023-07-25 PROCEDURE — 700111 HCHG RX REV CODE 636 W/ 250 OVERRIDE (IP): Mod: JZ | Performed by: PSYCHIATRY & NEUROLOGY

## 2023-07-25 PROCEDURE — 97112 NEUROMUSCULAR REEDUCATION: CPT

## 2023-07-25 PROCEDURE — A9270 NON-COVERED ITEM OR SERVICE: HCPCS | Performed by: INTERNAL MEDICINE

## 2023-07-25 PROCEDURE — 85027 COMPLETE CBC AUTOMATED: CPT

## 2023-07-25 PROCEDURE — 97530 THERAPEUTIC ACTIVITIES: CPT

## 2023-07-25 PROCEDURE — 770001 HCHG ROOM/CARE - MED/SURG/GYN PRIV*

## 2023-07-25 PROCEDURE — 80053 COMPREHEN METABOLIC PANEL: CPT

## 2023-07-25 PROCEDURE — A9270 NON-COVERED ITEM OR SERVICE: HCPCS | Performed by: STUDENT IN AN ORGANIZED HEALTH CARE EDUCATION/TRAINING PROGRAM

## 2023-07-25 PROCEDURE — 99232 SBSQ HOSP IP/OBS MODERATE 35: CPT | Performed by: NURSE PRACTITIONER

## 2023-07-25 PROCEDURE — 99232 SBSQ HOSP IP/OBS MODERATE 35: CPT | Performed by: INTERNAL MEDICINE

## 2023-07-25 PROCEDURE — 84100 ASSAY OF PHOSPHORUS: CPT

## 2023-07-25 PROCEDURE — 700105 HCHG RX REV CODE 258: Performed by: STUDENT IN AN ORGANIZED HEALTH CARE EDUCATION/TRAINING PROGRAM

## 2023-07-25 PROCEDURE — 36415 COLL VENOUS BLD VENIPUNCTURE: CPT

## 2023-07-25 PROCEDURE — 700111 HCHG RX REV CODE 636 W/ 250 OVERRIDE (IP): Mod: JZ | Performed by: STUDENT IN AN ORGANIZED HEALTH CARE EDUCATION/TRAINING PROGRAM

## 2023-07-25 RX ORDER — CLOPIDOGREL BISULFATE 75 MG/1
75 TABLET ORAL DAILY
Status: DISCONTINUED | OUTPATIENT
Start: 2023-07-25 | End: 2023-07-27 | Stop reason: HOSPADM

## 2023-07-25 RX ADMIN — PIPERACILLIN AND TAZOBACTAM 3.38 G: 3; .375 INJECTION, POWDER, FOR SOLUTION INTRAVENOUS at 20:06

## 2023-07-25 RX ADMIN — CLOPIDOGREL BISULFATE 75 MG: 75 TABLET ORAL at 08:01

## 2023-07-25 RX ADMIN — PIPERACILLIN AND TAZOBACTAM 3.38 G: 3; .375 INJECTION, POWDER, FOR SOLUTION INTRAVENOUS at 12:11

## 2023-07-25 RX ADMIN — ASPIRIN 81 MG 81 MG: 81 TABLET ORAL at 17:32

## 2023-07-25 RX ADMIN — MIDODRINE HYDROCHLORIDE 5 MG: 5 TABLET ORAL at 07:59

## 2023-07-25 RX ADMIN — PIPERACILLIN AND TAZOBACTAM 3.38 G: 3; .375 INJECTION, POWDER, FOR SOLUTION INTRAVENOUS at 06:10

## 2023-07-25 RX ADMIN — MIDODRINE HYDROCHLORIDE 5 MG: 5 TABLET ORAL at 12:11

## 2023-07-25 RX ADMIN — ENOXAPARIN SODIUM 40 MG: 100 INJECTION SUBCUTANEOUS at 17:32

## 2023-07-25 RX ADMIN — MIDODRINE HYDROCHLORIDE 5 MG: 5 TABLET ORAL at 17:32

## 2023-07-25 ASSESSMENT — COGNITIVE AND FUNCTIONAL STATUS - GENERAL
SUGGESTED CMS G CODE MODIFIER MOBILITY: CN
WALKING IN HOSPITAL ROOM: TOTAL
MOVING TO AND FROM BED TO CHAIR: UNABLE
MOVING FROM LYING ON BACK TO SITTING ON SIDE OF FLAT BED: UNABLE
MOBILITY SCORE: 6
TURNING FROM BACK TO SIDE WHILE IN FLAT BAD: UNABLE
CLIMB 3 TO 5 STEPS WITH RAILING: TOTAL
STANDING UP FROM CHAIR USING ARMS: TOTAL

## 2023-07-25 ASSESSMENT — ENCOUNTER SYMPTOMS
DIZZINESS: 0
NAUSEA: 0
DIARRHEA: 0
BLURRED VISION: 0
DOUBLE VISION: 0
VOMITING: 0
COUGH: 0
LOSS OF CONSCIOUSNESS: 0
FALLS: 0
CHILLS: 0
NERVOUS/ANXIOUS: 0
ABDOMINAL PAIN: 0
WEAKNESS: 0
MYALGIAS: 0
BACK PAIN: 0
SHORTNESS OF BREATH: 0
SORE THROAT: 0
HEADACHES: 0
FLANK PAIN: 0
SENSORY CHANGE: 1
FOCAL WEAKNESS: 1
PALPITATIONS: 0
FEVER: 0
BLOOD IN STOOL: 0
CONSTIPATION: 0

## 2023-07-25 ASSESSMENT — LIFESTYLE VARIABLES: SUBSTANCE_ABUSE: 0

## 2023-07-25 ASSESSMENT — PAIN DESCRIPTION - PAIN TYPE: TYPE: ACUTE PAIN

## 2023-07-25 ASSESSMENT — GAIT ASSESSMENTS: GAIT LEVEL OF ASSIST: UNABLE TO PARTICIPATE

## 2023-07-25 NOTE — CARE PLAN
The patient is Watcher - Medium risk of patient condition declining or worsening    Shift Goals  Clinical Goals: Monitor PEG site, neuro status  Patient Goals: Sleep  Family Goals: updates    Progress made toward(s) clinical / shift goals:    Problem: Risk for Aspiration  Goal: Patient's risk for aspiration will be absent or decrease  Outcome: Progressing  Note: PEG site in place, q4 30mL flushes, TF started with AM med pass.      Problem: Urinary Elimination  Goal: Establish and maintain regular urinary output  Outcome: Progressing  Note: Coude was found leaking, 9mL pulled from balloon and replaced w/ 10mL NS, output improved.        Patient is not progressing towards the following goals:

## 2023-07-25 NOTE — THERAPY
Physical Therapy   Daily Treatment     Patient Name: Niyah Collins  Age:  77 y.o., Sex:  male  Medical Record #: [id number]  Today's Date: 7/25/2023     Precautions  Precautions: Fall Risk;PEG Tube;Swallow Precautions  Comments: R hemiplegia, L gaze preference    Assessment    Pt responded well to use of mirror for visual feedback while sitting EOB to improve postural control and midline awareness. Pt able to initiate visual scanning to the right, but unable to sustain rightward gaze, and continues to demonstrate R hemibody inattention. Pt requires modA for bed mobility and standing transfers this date. Standing abilities limited primarily by fatigue and fear of falling. Recommend D/C to post-acute rehab once medically appropriate.     Plan    Treatment Plan Status: Continue Current Treatment Plan  Type of Treatment: Bed Mobility, Gait Training, Neuro Re-Education / Balance, Self Care / Home Evaluation, Therapeutic Activities  Treatment Frequency: 4 Times per Week  Treatment Duration: Until Therapy Goals Met    DC Equipment Recommendations: Unable to determine at this time  Discharge Recommendations: Recommend post-acute placement for additional physical therapy services prior to discharge home      Subjective    Pt endorsed being fearful of falling this date.     Objective     07/25/23 0859   Precautions   Precautions Fall Risk;PEG Tube;Swallow Precautions   Comments R hemiplegia, L gaze preference   Vitals   Blood Pressure  125/87   O2 Delivery Device None - Room Air   Pain   Pain Scales 0 to 10 Scale    Pain 0 - 10 Group   Pain Rating Scale (NPRS) 0   Cognition    Speech/ Communication Dysarthric   Orientation Level Not Oriented to Month;Not Oriented to Year   Ability To Follow Commands 1 Step   Comments Continued R inattention present   Neuro-Muscular Treatments   Neuro-Muscular Treatments Postural Facilitation;Joint Approximation;Weight Shift Right;Weight Shift Left   Comments Utilized mirror for additional  visual feedback while sitting EOB. Intermittent prompting for visual engagement w/mirror, and pt able to self initiation correction appropriate. Frequent cues to improve upright posture. Avoided supported use of LUE to decrease contraversive pushing tendencies w/some improvement. Mild perceptual discrepancies with midline positioning, improved over time. Facilitated R (for joint approximation and sensory input) and L elbow prop (improve left bush weight shift and for rest breaks). Facilitated visual scanning to the right. Able to cross midline ~30-40 deg, but difficulties sustaining >5 sec. Impaired awareness of RUE positioning while EOB.   Other Treatments   Other Treatments Provided Modified bed in environment to facilitate awareness of right side, and encourage visual scanning of external environment   Balance   Sitting Balance (Static) Poor -   Standing Balance (Static) Trace +   Comments CGA-Kayli for sitting balance, and maxA for standing balance due to strong R posterolateral lean   Bed Mobility    Supine to Sit Moderate Assist  (clear RLE off the bed, assist trunk upright. Use of rail and HOB elevated)   Sit to Supine Moderate Assist  (guide trunk/clear RLE onto the bed)   Skilled Intervention Verbal Cuing;Sequencing;Postural Facilitation   Comments Fair initiation of crossbody reaching with LUE to the right when cued   Gait Analysis   Gait Level Of Assist Unable to Participate   Functional Mobility   Sit to Stand Moderate Assist  (blocking RLE)   Bed, Chair, Wheelchair Transfer Unable to Participate  (limited by fatigue and fear of falling)   How much difficulty does the patient currently have...   Turning over in bed (including adjusting bedclothes, sheets and blankets)? 1   Sitting down on and standing up from a chair with arms (e.g., wheelchair, bedside commode, etc.) 1   Moving from lying on back to sitting on the side of the bed? 1   How much help from another person does the patient currently need...    Moving to and from a bed to a chair (including a wheelchair)? 1   Need to walk in a hospital room? 1   Climbing 3-5 steps with a railing? 1   6 clicks Mobility Score 6   Short Term Goals    Short Term Goal # 1 Pt will transfer supine<>sit with Kayli in 6 visits to improve bed mobility.   Goal Outcome # 1 Progressing as expected   Short Term Goal # 2 Pt will maintain midline unsupported position SBA >2 minutes while EOB to improve trunk control   Goal Outcome # 2 Progressing as expected   Short Term Goal # 3 Pt will transfer stand step bed<>chair/wc CGA w/LRAD in 6 visits to improve transfer abilities.   Goal Outcome # 3 Progressing as expected   Short Term Goal # 4 Pt will ambulate 5' maxA w/hemiwalker in 6 visits to improve standing and walking abilities.   Goal Outcome # 4 Progressing as expected   Education Group   Additional Comments Educated on stroke recovery, expectations, and performance   Anticipated Discharge Equipment and Recommendations   DC Equipment Recommendations Unable to determine at this time   Discharge Recommendations Recommend post-acute placement for additional physical therapy services prior to discharge home   Interdisciplinary Plan of Care Collaboration   IDT Collaboration with  Nursing   Patient Position at End of Therapy In Bed;Bed Alarm On;Tray Table within Reach;Call Light within Reach   Session Information   Date / Session Number  7/25-4(1/4, 7/31)

## 2023-07-25 NOTE — DISCHARGE PLANNING
Received Choice Form @: 5162  Agency/ Facility Name: [facility]  Referral Sent per Choice Form @: 1506    DPA to print and manually fax SNF referral due to size
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
## 2023-07-25 NOTE — DISCHARGE PLANNING
Case Management Discharge Planning    Admission Date: 7/13/2023  GMLOS: 4.1  ALOS: 11    6-Clicks ADL Score: 10  6-Clicks Mobility Score: 6  PT and/or OT Eval ordered: Yes  Post-acute Referrals Ordered: Yes  Post-acute Choice Obtained: Yes  Has referral(s) been sent to post-acute provider:  Yes      Anticipated Discharge Dispo: Discharge Disposition: D/T to SNF with Medicare cert in anticipation of skilled care (03)    DME Needed: No    Action(s) Taken: Choice obtained    LSW called pt son Pramod to discuss the pt discharge plan. LSW informed pt son that [facility] declined and we will need additional SNF choices. Pt son gave choice for [facility] in [place], [place]. LSW faxed choice form to [facility].     Escalations Completed: None    Medically Clear: No    Next Steps: f/u with pt and medical team to discuss dc needs and barriers.     Barriers to Discharge: Medical clearance and Pending Placement    Is the patient up for discharge tomorrow: No
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
## 2023-07-25 NOTE — CARE PLAN
The patient is Stable - Low risk of patient condition declining or worsening    Shift Goals  Clinical Goals: safety/ PEG tube  Patient Goals: comfort  Family Goals: updates    Progress made toward(s) clinical / shift goals:  PEG inserted, reports comfort   Problem: Optimal Care of the Stroke Patient  Goal: Optimal emergency care for the stroke patient  Description: Target End Date:  End of day 1    Time of Onset    1.  Time of last known well obtained  2.  Patient and family/caregiver verbalize understanding of diagnosis, medications and testing  3.  NIHSS performed and documented, including date and time, for ischemic stroke patients prior to any acute recanalization therapy (thrombolytics or mechanical) or within 12 hours of arrival if no intervention is warranted  4.  Consults and referrals placed to appropriate departments    Medications Administration as Ordered:    1.  Implement appropriate reversal agents for INR greater than 1.5  2.  Pre-alteplase administration of antihypertensives for SBP >185 DBP >110  3.  Post-alteplase administration of antihypertensives for SBP >185, DBP >105  4.  Thrombolytic Therapy for qualifying ischemic stroke patients who arrive within 4.5 hours of time of Last Known Well. Thrombolytic therapy administered within 30 minutes or a documented reason for delay  Outcome: Progressing     Problem: Knowledge Deficit - Stroke Education  Goal: Patient's knowledge of stroke and risk factors will improve  Description: Target End Date:  1-3 days or as soon as patient condition allows    Document in Patient Education    1.  Stroke education booklet provided  2.  Education regarding EMS activation, need for follow up, medication prescribed at discharge, risk factors for stroke/lifestyle modifications, warning signs and symptoms of stroke provided  Outcome: Progressing     Problem: Neuro Status  Goal: Neuro status will remain stable or improve  Description: Target End Date:  Prior to discharge  or change in level of care    Document on Neuro assessment in the Assessment flowsheet    1.  Assess and monitor neurologic status per provider order/protocol/unit policy  2.  Assess level of consciousness and orientation  3.  Assess for speech, dysarthria, dysphagia, facial symmetry  4.  Assess visual field, eye movements, gaze preference, pupil reaction and size  5.  Assess muscle strength and motor response in all four extremities  6.  Assess for sensation (numbness and tingling)  7.  Assess basic neuro reflexes (cough, gag, corneal)  8.  Identify changes in neuro status and report to provider for testing/treatment orders  Outcome: Progressing  Flowsheets (Taken 7/24/2023 8474)  Level of Consciousness: Alert       Patient is not progressing towards the following goals:

## 2023-07-25 NOTE — CARE PLAN
The patient is Watcher - Medium risk of patient condition declining or worsening    Shift Goals  Clinical Goals: tube feed  Patient Goals: cuco  Family Goals: updates    Progress made toward(s) clinical / shift goals:    Problem: Knowledge Deficit - Stroke Education  Goal: Patient's knowledge of stroke and risk factors will improve  Description: Target End Date:  1-3 days or as soon as patient condition allows    Document in Patient Education    1.  Stroke education booklet provided  2.  Education regarding EMS activation, need for follow up, medication prescribed at discharge, risk factors for stroke/lifestyle modifications, warning signs and symptoms of stroke provided  Outcome: Progressing       Patient is not progressing towards the following goals:

## 2023-07-26 ENCOUNTER — APPOINTMENT (OUTPATIENT)
Dept: RADIOLOGY | Facility: MEDICAL CENTER | Age: 78
DRG: 023 | End: 2023-07-26
Attending: INTERNAL MEDICINE
Payer: MEDICARE

## 2023-07-26 LAB
ALBUMIN SERPL BCP-MCNC: 2.9 G/DL (ref 3.2–4.9)
ALBUMIN/GLOB SERPL: 0.8 G/DL
ALP SERPL-CCNC: 376 U/L (ref 30–99)
ALT SERPL-CCNC: 211 U/L (ref 2–50)
ANION GAP SERPL CALC-SCNC: 11 MMOL/L (ref 7–16)
AST SERPL-CCNC: 121 U/L (ref 12–45)
BACTERIA BLD CULT: NORMAL
BACTERIA BLD CULT: NORMAL
BASOPHILS # BLD AUTO: 0.9 % (ref 0–1.8)
BASOPHILS # BLD: 0.09 K/UL (ref 0–0.12)
BILIRUB SERPL-MCNC: 0.9 MG/DL (ref 0.1–1.5)
BUN SERPL-MCNC: 39 MG/DL (ref 8–22)
CALCIUM ALBUM COR SERPL-MCNC: 10.1 MG/DL (ref 8.5–10.5)
CALCIUM SERPL-MCNC: 9.2 MG/DL (ref 8.5–10.5)
CHLORIDE SERPL-SCNC: 114 MMOL/L (ref 96–112)
CO2 SERPL-SCNC: 21 MMOL/L (ref 20–33)
CREAT SERPL-MCNC: 1.03 MG/DL (ref 0.5–1.4)
EOSINOPHIL # BLD AUTO: 0.23 K/UL (ref 0–0.51)
EOSINOPHIL NFR BLD: 2.3 % (ref 0–6.9)
ERYTHROCYTE [DISTWIDTH] IN BLOOD BY AUTOMATED COUNT: 41.3 FL (ref 35.9–50)
GFR SERPLBLD CREATININE-BSD FMLA CKD-EPI: 75 ML/MIN/1.73 M 2
GLOBULIN SER CALC-MCNC: 3.5 G/DL (ref 1.9–3.5)
GLUCOSE SERPL-MCNC: 120 MG/DL (ref 65–99)
HCT VFR BLD AUTO: 45.6 % (ref 42–52)
HGB BLD-MCNC: 15.2 G/DL (ref 14–18)
IMM GRANULOCYTES # BLD AUTO: 0.4 K/UL (ref 0–0.11)
IMM GRANULOCYTES NFR BLD AUTO: 4 % (ref 0–0.9)
LYMPHOCYTES # BLD AUTO: 1.06 K/UL (ref 1–4.8)
LYMPHOCYTES NFR BLD: 10.5 % (ref 22–41)
MAGNESIUM SERPL-MCNC: 2.6 MG/DL (ref 1.5–2.5)
MCH RBC QN AUTO: 29.7 PG (ref 27–33)
MCHC RBC AUTO-ENTMCNC: 33.3 G/DL (ref 32.3–36.5)
MCV RBC AUTO: 89.1 FL (ref 81.4–97.8)
MONOCYTES # BLD AUTO: 0.85 K/UL (ref 0–0.85)
MONOCYTES NFR BLD AUTO: 8.4 % (ref 0–13.4)
NEUTROPHILS # BLD AUTO: 7.48 K/UL (ref 1.82–7.42)
NEUTROPHILS NFR BLD: 73.9 % (ref 44–72)
NRBC # BLD AUTO: 0 K/UL
NRBC BLD-RTO: 0 /100 WBC (ref 0–0.2)
PHOSPHATE SERPL-MCNC: 3.2 MG/DL (ref 2.5–4.5)
PLATELET # BLD AUTO: 283 K/UL (ref 164–446)
PMV BLD AUTO: 10.3 FL (ref 9–12.9)
POTASSIUM SERPL-SCNC: 3.8 MMOL/L (ref 3.6–5.5)
PROT SERPL-MCNC: 6.4 G/DL (ref 6–8.2)
RBC # BLD AUTO: 5.12 M/UL (ref 4.7–6.1)
SIGNIFICANT IND 70042: NORMAL
SIGNIFICANT IND 70042: NORMAL
SITE SITE: NORMAL
SITE SITE: NORMAL
SODIUM SERPL-SCNC: 146 MMOL/L (ref 135–145)
SOURCE SOURCE: NORMAL
SOURCE SOURCE: NORMAL
WBC # BLD AUTO: 10.1 K/UL (ref 4.8–10.8)

## 2023-07-26 PROCEDURE — 49465 FLUORO EXAM OF G/COLON TUBE: CPT

## 2023-07-26 PROCEDURE — 700102 HCHG RX REV CODE 250 W/ 637 OVERRIDE(OP): Performed by: STUDENT IN AN ORGANIZED HEALTH CARE EDUCATION/TRAINING PROGRAM

## 2023-07-26 PROCEDURE — A9270 NON-COVERED ITEM OR SERVICE: HCPCS | Performed by: INTERNAL MEDICINE

## 2023-07-26 PROCEDURE — 83735 ASSAY OF MAGNESIUM: CPT

## 2023-07-26 PROCEDURE — 700117 HCHG RX CONTRAST REV CODE 255: Performed by: INTERNAL MEDICINE

## 2023-07-26 PROCEDURE — 700111 HCHG RX REV CODE 636 W/ 250 OVERRIDE (IP): Mod: JZ | Performed by: PSYCHIATRY & NEUROLOGY

## 2023-07-26 PROCEDURE — 700111 HCHG RX REV CODE 636 W/ 250 OVERRIDE (IP): Mod: JZ | Performed by: STUDENT IN AN ORGANIZED HEALTH CARE EDUCATION/TRAINING PROGRAM

## 2023-07-26 PROCEDURE — 85025 COMPLETE CBC W/AUTO DIFF WBC: CPT

## 2023-07-26 PROCEDURE — 84100 ASSAY OF PHOSPHORUS: CPT

## 2023-07-26 PROCEDURE — 770001 HCHG ROOM/CARE - MED/SURG/GYN PRIV*

## 2023-07-26 PROCEDURE — 700102 HCHG RX REV CODE 250 W/ 637 OVERRIDE(OP): Performed by: PSYCHIATRY & NEUROLOGY

## 2023-07-26 PROCEDURE — 80053 COMPREHEN METABOLIC PANEL: CPT

## 2023-07-26 PROCEDURE — 700102 HCHG RX REV CODE 250 W/ 637 OVERRIDE(OP): Performed by: INTERNAL MEDICINE

## 2023-07-26 PROCEDURE — A9270 NON-COVERED ITEM OR SERVICE: HCPCS | Performed by: STUDENT IN AN ORGANIZED HEALTH CARE EDUCATION/TRAINING PROGRAM

## 2023-07-26 PROCEDURE — 700111 HCHG RX REV CODE 636 W/ 250 OVERRIDE (IP): Performed by: INTERNAL MEDICINE

## 2023-07-26 PROCEDURE — C9113 INJ PANTOPRAZOLE SODIUM, VIA: HCPCS | Performed by: INTERNAL MEDICINE

## 2023-07-26 PROCEDURE — 99232 SBSQ HOSP IP/OBS MODERATE 35: CPT | Performed by: INTERNAL MEDICINE

## 2023-07-26 PROCEDURE — A9270 NON-COVERED ITEM OR SERVICE: HCPCS | Performed by: PSYCHIATRY & NEUROLOGY

## 2023-07-26 PROCEDURE — 36415 COLL VENOUS BLD VENIPUNCTURE: CPT

## 2023-07-26 PROCEDURE — 97530 THERAPEUTIC ACTIVITIES: CPT

## 2023-07-26 PROCEDURE — 700105 HCHG RX REV CODE 258: Performed by: STUDENT IN AN ORGANIZED HEALTH CARE EDUCATION/TRAINING PROGRAM

## 2023-07-26 RX ORDER — PANTOPRAZOLE SODIUM 40 MG/10ML
40 INJECTION, POWDER, LYOPHILIZED, FOR SOLUTION INTRAVENOUS 2 TIMES DAILY
Status: DISCONTINUED | OUTPATIENT
Start: 2023-07-26 | End: 2023-07-27 | Stop reason: HOSPADM

## 2023-07-26 RX ADMIN — PANTOPRAZOLE SODIUM 40 MG: 40 INJECTION, POWDER, FOR SOLUTION INTRAVENOUS at 21:24

## 2023-07-26 RX ADMIN — IOHEXOL 30 ML: 350 INJECTION, SOLUTION INTRAVENOUS at 21:10

## 2023-07-26 RX ADMIN — PIPERACILLIN AND TAZOBACTAM 3.38 G: 3; .375 INJECTION, POWDER, FOR SOLUTION INTRAVENOUS at 04:39

## 2023-07-26 RX ADMIN — CLOPIDOGREL BISULFATE 75 MG: 75 TABLET ORAL at 04:34

## 2023-07-26 RX ADMIN — MIDODRINE HYDROCHLORIDE 5 MG: 5 TABLET ORAL at 17:38

## 2023-07-26 RX ADMIN — MIDODRINE HYDROCHLORIDE 5 MG: 5 TABLET ORAL at 08:50

## 2023-07-26 RX ADMIN — ATORVASTATIN CALCIUM 80 MG: 80 TABLET, FILM COATED ORAL at 17:38

## 2023-07-26 RX ADMIN — MIDODRINE HYDROCHLORIDE 5 MG: 5 TABLET ORAL at 11:45

## 2023-07-26 ASSESSMENT — ENCOUNTER SYMPTOMS
COUGH: 0
FEVER: 0
SENSORY CHANGE: 1
ABDOMINAL PAIN: 0
BACK PAIN: 0
CHILLS: 0
DIARRHEA: 0
VOMITING: 0
PALPITATIONS: 0
BLURRED VISION: 0
HEADACHES: 0
DOUBLE VISION: 0
FOCAL WEAKNESS: 1
NAUSEA: 0
SHORTNESS OF BREATH: 0
DIZZINESS: 0
LOSS OF CONSCIOUSNESS: 0
SORE THROAT: 0

## 2023-07-26 ASSESSMENT — COGNITIVE AND FUNCTIONAL STATUS - GENERAL
SUGGESTED CMS G CODE MODIFIER MOBILITY: CN
STANDING UP FROM CHAIR USING ARMS: TOTAL
MOBILITY SCORE: 6
MOVING FROM LYING ON BACK TO SITTING ON SIDE OF FLAT BED: UNABLE
TURNING FROM BACK TO SIDE WHILE IN FLAT BAD: UNABLE
MOVING TO AND FROM BED TO CHAIR: UNABLE
WALKING IN HOSPITAL ROOM: TOTAL
CLIMB 3 TO 5 STEPS WITH RAILING: TOTAL

## 2023-07-26 ASSESSMENT — PAIN DESCRIPTION - PAIN TYPE: TYPE: ACUTE PAIN

## 2023-07-26 ASSESSMENT — GAIT ASSESSMENTS: GAIT LEVEL OF ASSIST: UNABLE TO PARTICIPATE

## 2023-07-26 NOTE — CARE PLAN
The patient is Watcher - Medium risk of patient condition declining or worsening    Shift Goals  Clinical Goals: Q2hr turns, Stable neuro assessments  Patient Goals: BRENDAN  Family Goals: No Family Present    Progress made toward(s) clinical / shift goals:  No acute events at this time. Pt is able to answer some questions appropriately but appears to be tired/fatigued at this time.  Pt has slurred speech but nods appropriately. Pt turned Q2hrs.  TF continued at goal. Will continue with plan of care.    Patient is not progressing towards the following goals:     Problem: Dysphagia  Goal: Dysphagia will improve  Outcome: Not Met - Pt is NPO with PEG tube     Problem: Urinary Elimination  Goal: Establish and maintain regular urinary output  Outcome: Not Met - Ellison remains in place     Problem: Knowledge Deficit - Standard  Goal: Patient and family/care givers will demonstrate understanding of plan of care, disease process/condition, diagnostic tests and medications  Outcome: Not Met

## 2023-07-26 NOTE — THERAPY
Physical Therapy   Daily Treatment     Patient Name: Niyah Collins  Age:  77 y.o., Sex:  male  Medical Record #: [id number]  Today's Date: 7/26/2023     Precautions  Precautions: Fall Risk;Swallow Precautions  Comments: R hemiplegia, L gaze preference    Assessment    Rec'd pt alert, in bed, verbalizing agreement to work w/ PT.  Worked on attending to pts right side.  He denies any sensation/light touch to his right UE and LE.  Despite multiple visual and tactile cues, pt is unable to attend to his RUE.  With assist, pt did assist in negotiating his RLE w/ use of his LLE, for on/off the bed.  Mod assist to sit eob and to find midline.  Once in midline, pt is able to maintain w/o UE assist for several seconds.  With his LUE holding onto the side rail, he is able to maintain his balance and accept minimal perterbations.   Worked on moving pt outside MILA and having pt return to midline, needing mod assist.  Also worked on protective and righting reactions while sitting eob, which pt also needed mod assist.      Plan    Treatment Plan Status: Continue Current Treatment Plan  Type of Treatment: Bed Mobility, Gait Training, Neuro Re-Education / Balance, Self Care / Home Evaluation, Therapeutic Activities  Treatment Frequency: 4 Times per Week  Treatment Duration: Until Therapy Goals Met    DC Equipment Recommendations: Unable to determine at this time  Discharge Recommendations: Recommend post-acute placement for additional physical therapy services prior to discharge home        Objective       07/26/23 0829   Balance   Sitting Balance (Static) Poor   Sitting Balance (Dynamic) Poor -   Standing Balance (Static) Trace +   Standing Balance (Dynamic) Trace +   Weight Shift Sitting Poor   Weight Shift Standing Absent   Skilled Intervention Verbal Cuing;Tactile Cuing;Facilitation;Compensatory Strategies   Comments mod assist to assist pt into midline   Bed Mobility    Supine to Sit Moderate Assist   Sit to Supine Maximal  Assist   Rolling Moderate Assist to Lt.   Skilled Intervention Verbal Cuing;Tactile Cuing;Sequencing;Facilitation;Compensatory Strategies   Gait Analysis   Gait Level Of Assist Unable to Participate   Functional Mobility   Sit to Stand Maximal Assist   Bed, Chair, Wheelchair Transfer Unable to Participate   Skilled Intervention Tactile Cuing;Sequencing;Verbal Cuing;Facilitation   Short Term Goals    Short Term Goal # 1 Pt will transfer supine<>sit with Kayli in 6 visits to improve bed mobility.   Goal Outcome # 1 goal not met   Short Term Goal # 2 Pt will maintain midline unsupported position SBA >2 minutes while EOB to improve trunk control   Goal Outcome # 2 Goal not met   Short Term Goal # 3 Pt will transfer stand step bed<>chair/wc CGA w/LRAD in 6 visits to improve transfer abilities.   Goal Outcome # 3 Goal not met   Short Term Goal # 4 Pt will ambulate 5' maxA w/hemiwalker in 6 visits to improve standing and walking abilities.   Goal Outcome # 4 Goal not met   Physical Therapy Treatment Plan   Physical Therapy Treatment Plan Continue Current Treatment Plan   Anticipated Discharge Equipment and Recommendations   DC Equipment Recommendations Unable to determine at this time   Discharge Recommendations Recommend post-acute placement for additional physical therapy services prior to discharge home

## 2023-07-26 NOTE — DISCHARGE PLANNING
Case Management Discharge Planning    Admission Date: 7/13/2023  GMLOS: 4.1  ALOS: 12    6-Clicks ADL Score: 10  6-Clicks Mobility Score: 6  PT and/or OT Eval ordered: Yes  Post-acute Referrals Ordered: Yes  Post-acute Choice Obtained: Yes  Has referral(s) been sent to post-acute provider:  Yes      Anticipated Discharge Dispo: Discharge Disposition: D/T to SNF with Medicare cert in anticipation of skilled care (03)    DME Needed: No    Action(s) Taken: Choice obtained    LSW called pt son Pramod to discuss the pt DC plan. LSW informed pt son that the pt has been declined at all SNF that a referral was sent to. Pt son stated he was agreeable to sending SNF referrals to all facilities in [facility]. LSW received choice and sent to [facility].     Escalations Completed: None    Medically Clear: Yes    Next Steps: LSW will continue to assist with discharge as needed.      Barriers to Discharge: Pending Placement
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
## 2023-07-26 NOTE — PROGRESS NOTES
No residual noted with tube feeds tolerating goal rate of 50 cc. Bleeding noted around g-tube site gauze changed times three np Guillermo and Dr. Chow norified. 1/2 dollar size areas of blood on gauze each time.4 x 4 gauze saturated with blood removed and applied a pressure dressing. Charge Nitish ALMENDAREZ aware and Dr. Chow notified.

## 2023-07-26 NOTE — DISCHARGE PLANNING
Agency/Facility Name: [facility]  Spoke To: Charlene  Outcome: DPA called facility to ask for updated referral status. Per Charlene, facility has to decline pt as facility does not have appropriate bed available    Agency/Facility Name: [facility]   Spoke To: Rissa  Outcome: DPA called facility to ask for updated referral status. Per Rissa, facility is low on staff so unable to admit any new pts at this time

## 2023-07-27 ENCOUNTER — PATIENT OUTREACH (OUTPATIENT)
Dept: SCHEDULING | Facility: IMAGING CENTER | Age: 78
End: 2023-07-27
Payer: MEDICARE

## 2023-07-27 VITALS
RESPIRATION RATE: 17 BRPM | TEMPERATURE: 98.1 F | DIASTOLIC BLOOD PRESSURE: 79 MMHG | SYSTOLIC BLOOD PRESSURE: 113 MMHG | WEIGHT: 170.42 LBS | HEART RATE: 86 BPM | OXYGEN SATURATION: 94 % | BODY MASS INDEX: 21.19 KG/M2 | HEIGHT: 75 IN

## 2023-07-27 LAB
ALBUMIN SERPL BCP-MCNC: 2.9 G/DL (ref 3.2–4.9)
ALBUMIN/GLOB SERPL: 0.9 G/DL
ALP SERPL-CCNC: 359 U/L (ref 30–99)
ALT SERPL-CCNC: 216 U/L (ref 2–50)
ANION GAP SERPL CALC-SCNC: 11 MMOL/L (ref 7–16)
AST SERPL-CCNC: 122 U/L (ref 12–45)
BILIRUB SERPL-MCNC: 0.6 MG/DL (ref 0.1–1.5)
BUN SERPL-MCNC: 38 MG/DL (ref 8–22)
CALCIUM ALBUM COR SERPL-MCNC: 10.1 MG/DL (ref 8.5–10.5)
CALCIUM SERPL-MCNC: 9.2 MG/DL (ref 8.5–10.5)
CHLORIDE SERPL-SCNC: 115 MMOL/L (ref 96–112)
CO2 SERPL-SCNC: 21 MMOL/L (ref 20–33)
CREAT SERPL-MCNC: 0.86 MG/DL (ref 0.5–1.4)
GFR SERPLBLD CREATININE-BSD FMLA CKD-EPI: 90 ML/MIN/1.73 M 2
GLOBULIN SER CALC-MCNC: 3.3 G/DL (ref 1.9–3.5)
GLUCOSE SERPL-MCNC: 154 MG/DL (ref 65–99)
HCT VFR BLD AUTO: 45.6 % (ref 42–52)
HGB BLD-MCNC: 14.9 G/DL (ref 14–18)
MAGNESIUM SERPL-MCNC: 2.6 MG/DL (ref 1.5–2.5)
PHOSPHATE SERPL-MCNC: 2.7 MG/DL (ref 2.5–4.5)
POTASSIUM SERPL-SCNC: 3.8 MMOL/L (ref 3.6–5.5)
PROT SERPL-MCNC: 6.2 G/DL (ref 6–8.2)
SODIUM SERPL-SCNC: 147 MMOL/L (ref 135–145)

## 2023-07-27 PROCEDURE — 83735 ASSAY OF MAGNESIUM: CPT

## 2023-07-27 PROCEDURE — A9270 NON-COVERED ITEM OR SERVICE: HCPCS | Performed by: STUDENT IN AN ORGANIZED HEALTH CARE EDUCATION/TRAINING PROGRAM

## 2023-07-27 PROCEDURE — 99239 HOSP IP/OBS DSCHRG MGMT >30: CPT | Performed by: INTERNAL MEDICINE

## 2023-07-27 PROCEDURE — 85018 HEMOGLOBIN: CPT

## 2023-07-27 PROCEDURE — C9113 INJ PANTOPRAZOLE SODIUM, VIA: HCPCS | Performed by: INTERNAL MEDICINE

## 2023-07-27 PROCEDURE — 84100 ASSAY OF PHOSPHORUS: CPT

## 2023-07-27 PROCEDURE — 85014 HEMATOCRIT: CPT

## 2023-07-27 PROCEDURE — 80053 COMPREHEN METABOLIC PANEL: CPT

## 2023-07-27 PROCEDURE — 36415 COLL VENOUS BLD VENIPUNCTURE: CPT

## 2023-07-27 PROCEDURE — 700111 HCHG RX REV CODE 636 W/ 250 OVERRIDE (IP): Performed by: INTERNAL MEDICINE

## 2023-07-27 PROCEDURE — 700102 HCHG RX REV CODE 250 W/ 637 OVERRIDE(OP): Performed by: STUDENT IN AN ORGANIZED HEALTH CARE EDUCATION/TRAINING PROGRAM

## 2023-07-27 RX ORDER — OMEPRAZOLE 40 MG/1
40 CAPSULE, DELAYED RELEASE ORAL DAILY
Qty: 30 CAPSULE | Status: SHIPPED
Start: 2023-07-27 | End: 2023-08-21

## 2023-07-27 RX ORDER — CLOPIDOGREL BISULFATE 75 MG/1
75 TABLET ORAL DAILY
Qty: 30 TABLET | Status: SHIPPED
Start: 2023-07-29

## 2023-07-27 RX ORDER — ASPIRIN 81 MG/1
81 TABLET, CHEWABLE ORAL DAILY
Qty: 100 TABLET | Refills: 0 | Status: SHIPPED
Start: 2023-07-29

## 2023-07-27 RX ORDER — MIDODRINE HYDROCHLORIDE 5 MG/1
2.5 TABLET ORAL
Status: DISCONTINUED | OUTPATIENT
Start: 2023-07-27 | End: 2023-07-27 | Stop reason: HOSPADM

## 2023-07-27 RX ORDER — MIDODRINE HYDROCHLORIDE 2.5 MG/1
2.5 TABLET ORAL
Qty: 60 TABLET | Status: SHIPPED
Start: 2023-07-27

## 2023-07-27 RX ORDER — ATORVASTATIN CALCIUM 80 MG/1
80 TABLET, FILM COATED ORAL EVERY EVENING
Qty: 30 TABLET | Status: SHIPPED
Start: 2023-07-27

## 2023-07-27 RX ADMIN — MIDODRINE HYDROCHLORIDE 5 MG: 5 TABLET ORAL at 06:42

## 2023-07-27 RX ADMIN — PANTOPRAZOLE SODIUM 40 MG: 40 INJECTION, POWDER, FOR SOLUTION INTRAVENOUS at 05:24

## 2023-07-27 NOTE — DISCHARGE PLANNING
Received Choice form at 1621  Agency/Facility Name: [facility]  Referral sent per Choice form @ [id number]      PASRR number (if applicable):   LOC number (if applicable):

## 2023-07-27 NOTE — CARE PLAN
Problem: Discharge Planning - Stroke  Goal: Ensure Stroke Core Measures are met prior to discharge  Description: Target End Date:  Prior to discharge or change in level of care    1. Patient discharged on antithrombotic therapy (Ischemic Stroke)  2. Patient discharged on intensive statin if LDL is greater than or equal to 70 mg/dl (Ischemic Stroke)  3. Patient discharged on anticoagulation therapy for patients with atrial fibrillation/flutter (Ischemic Stroke)  4. Smoking education/cessation provided if applicable  Outcome: Progressing   The patient is Watcher - Medium risk of patient condition declining or worsening    Shift Goals  Clinical Goals: turn every 2 hours  Patient Goals: cuco  Family Goals: No Family Present    Progress made toward(s) clinical / shift goals:      Patient is not progressing towards the following goals:

## 2023-07-27 NOTE — HOSPITAL COURSE
77 y.o. male who presented 7/13/2023 with previous medical history that includes hypertension, diabetes.     Patient was brought to us from [place] where he lives after developing right-sided weakness, involving the face leg and arm.  Patient also had dysarthria.  Imaging demonstrated a left M1 occlusion and critical L cranial ICA stenosis.  He is now s/p IV-TNK and TICI 2c mechanical thrombectomy.  MRI brain with modest stroke burden in L MCA territory that precludes expedited intracranial stent placement and DAPT due to high risk for catastrophic reperfusion injury.  Neurology recommend mild acute hypertensive response to ensure adequate flow through his flow limiting stenosis. Eventual DAPT, and consideration of intracranial stent placement if significant recovery seen in upcoming months (as currently with relatively dense L MCA syndrome).  GI was consulted and PEG placed 07/24, tolerating tube feeds. Patient did have some bleeding at PEG site which is now resolved. Would sill hold aspirin, plavix until 07/28 and continue PPI. Hemoglobin stable. PT/OT recommend post acute placement which case management assisted in setting up. Patient was determined satisfactory for discharge with appropriate follow up.

## 2023-07-27 NOTE — CARE PLAN
The patient is Watcher - Medium risk of patient condition declining or worsening    Shift Goals  Clinical Goals: monitor signs of bleeding, q2 turns, stable neuro status, maintain skin integrity, vss  Patient Goals: BRENDAN  Family Goals: updates    Progress made toward(s) clinical / shift goals:      Problem: Psychosocial - Patient Condition  Goal: Patient's ability to verbalize feelings about condition will improve  Outcome: Progressing  Flowsheets (Taken 7/21/2023 0401)  Condition Will Improve:   Encouraged patient participation in care   Discussed coping with medical condition and its effects  Note: Update pt on plan of care, medial interventions, diagnostic medical tests and lab tests. Pt verbalized understanding and consent     Problem: Neuro Status  Goal: Neuro status will remain stable or improve  Outcome: Progressing  Note: Pt neuro status remained the same over night. No acute changes. Q4h neuro checks in place.     Problem: Hemodynamic Monitoring  Goal: Patient's hemodynamics, fluid balance and neurologic status will be stable or improve  Outcome: Progressing  Note: Pt is hemodynamically stable. VS are stable. Urine output is adequate. No sign of further bleeding on peg tube site.     Problem: Risk for Aspiration  Goal: Patient's risk for aspiration will be absent or decrease  Outcome: Progressing  Flowsheets (Taken 7/27/2023 0257)  Aspiration Prevention:   Assessed for signs and symptoms of aspiration   Elevated head of bed to 90 degrees  Head of Bed Elevated: Greater or equal to 30 degrees  Head Of Bed: Greater than 30 degrees  Note: All aspiration precautions in place. Suction set up at bedside.      Problem: Urinary Elimination  Goal: Establish and maintain regular urinary output  Outcome: Progressing  Note: Monitoring pt's urine output thru caude ann. Ann care done. Assessed ann each time for patency. Bladder scanned as needed.     Problem: Knowledge Deficit - Standard  Goal: Patient and  family/care givers will demonstrate understanding of plan of care, disease process/condition, diagnostic tests and medications  Outcome: Progressing  Note: Plan of care and medications discussed and reviewed over with pt.      Problem: Skin Integrity  Goal: Skin integrity is maintained or improved  Outcome: Progressing  Note: Assessed skin qshift, on q2 h turns, on waffle mattress, using pillows and tap system for turns

## 2023-07-27 NOTE — DISCHARGE PLANNING
Agency/Facility Name: [facility]  Spoke To: Dong  Outcome: DPA called facility to ask for bed availability. Per Dong, pt has a bed  DPA requested 1230 transport with [facility] via [facility] RED to call facility with confirmed transport time    1141    Agency/Facility Name: [facility]  Spoke To: Dong  Outcome: DPA called facility and gave confirmed transport time
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
## 2023-07-27 NOTE — DISCHARGE PLANNING
DC Transport Scheduled    Received request at: 7/27/2023 at 1043    Transport Company Scheduled:  [facility]  Spoke with Judy at [facility] to schedule transport.    Scheduled Date: 7/27/2023  Scheduled Time: 1300    Destination: [facility] at [address] [place] [address] [place]     Notified care team of scheduled transport via Voalte.     If there are any changes needed to the DC transportation scheduled, please contact [facility] at ext. [phone] between the hours of 5025-4231 Mon-Fri. If outside those hours, contact the ED Case Manager at ext. [phone].

## 2023-07-27 NOTE — DISCHARGE PLANNING
Actual Discharge Information     Discharge Disposition: D/T to SNF with Medicare cert in anticipation of skilled care (03)    Patient accepted at [facility]     [facility] transport at 1300 via gurney.     IMM explained, pt verbalized to signed (pt is unable to sign), copy provided and original faxed to [facility] for scanning.     No other CM needs at this time.

## 2023-07-27 NOTE — DISCHARGE PLANNING
Case Management Discharge Planning    Admission Date: 7/13/2023  GMLOS: 4.1  ALOS: 13    6-Clicks ADL Score: 10  6-Clicks Mobility Score: 6  PT and/or OT Eval ordered: Yes  Post-acute Referrals Ordered: Yes  Post-acute Choice Obtained: Yes  Has referral(s) been sent to post-acute provider:  Yes      Anticipated Discharge Dispo: Discharge Disposition: D/T to SNF with Medicare cert in anticipation of skilled care (03)    DME Needed: No    Action(s) Taken: LSW called pt son Pramod to discuss the pt DC plan. LSW informed the pt son that the pt has been accepted to [facility]. LSW provided pt son the facilities address and phone number. Pt son is agreeable to the pt going to [facility].     Escalations Completed: None    Medically Clear: Yes    Next Steps: LSW will continue to assist with discharge as needed.      Barriers to Discharge: Pending Placement and Transportation

## 2023-07-27 NOTE — DISCHARGE INSTRUCTIONS
Ischemic Stroke  Discharge Instructions    You experienced an Ischemic Stroke.  Ischemic stroke is the most common type of stroke and happens when an artery in the brain becomes blocked by a plaque fragment or blood clot. Typically, these blockages travel from the heart or larger arteries that supply the brain.  The brain needs a constant supply of blood, which carries oxygen and nutrients it needs to function.  A stroke occurs when one of these arteries to the brain is either blocked or bursts. As a result, part of the brain does not get the blood it needs, so it starts to die.     Treatment Received:  Mechanical Thrombectomy    You had a Mechanical Thrombectomy.  A Mechanical Thrombectomy is a procedure to remove blood clots from the brain after an ischemic stroke.  The procedure involves making a small incision in the groin, and threading thin tubes (catheters) through your blood vessels to the clot. A tiny device at the catheter's tip grabs the clot and removes it, restoring blood flow to the brain.    Home Care Instructions:    Catheter insertion site care  If you have a bandage, make sure you:  Wash your hands with soap and water for at least 20 seconds before and after you change your bandage. If you cannot use soap and water, use hand .  Change your bandage as told by your doctor.  Check the site every day for signs of infection. Check for:  More redness, swelling, or pain.  Fluid or blood.  Warmth.  Pus or a bad smell.  Activity  Do not lift anything that is heavier than 5 lb (2.3 kg)  Rest  For the first 2-3 days after your procedure, or as long as told:  Try not to climb stairs.  Do not squat.  Return to your normal activities when your provider says that it is safe.  Get up to take short walks every 1 to 2 hours. Ask for help if you feel weak or unsteady.  Do exercises as told by your doctor.    General instructions  Take over-the-counter and prescription medicines as directed by your  "provider.  Do not smoke or use any products that contain nicotine or tobacco.   Keep all follow-up visits.    Contact your provider if:  You have more redness, swelling, or pain around the site where the catheter was put in.  You have a fever.    Thrombolytic Treatment for Ischemic Stroke    You received thrombolytic treatment for an Ischemic Stroke. Thrombolytics are medicines that can break up blood clots. These medicines help to treat a stroke when given as soon as possible after stroke symptoms start.  The medicine was given to you through an IV tube.  In some cases, the medicine may go to the affected area through a thin tube (catheter). This tube is usually put in at the top of your leg.    Get help right away if:  You have blood in your vomit, pee, or poop.  You have a serious fall or accident, or you hit your head.  You have symptoms of an allergy to the medicine, such as a rash or trouble breathing.  You have other signs of a stroke, such as:  A sudden, very bad headache with no known cause.  Feeling like you may vomit (nausea).  Vomiting.  A seizure    Stroke Risk Factors    You are at increased risk of having another stroke event.  See your Patient Stroke Guide to help reduce your stroke risk. These are your specific risk factors:  Age - Over 55     Get help right away if you have any signs of a stroke.  \"BE FAST\" is an easy way to remember the main warning signs of a stroke:  B - Balance. Dizziness, sudden trouble walking, or loss of balance.  E - Eyes. Trouble seeing or a change in how you see.  F - Face. Sudden weakness or loss of feeling in the face. The face or eyelid may droop on one side.  A - Arms. Weakness or loss of feeling in an arm. This happens all of a sudden and most often on one side of the body.  S - Speech. Sudden trouble speaking, slurred speech, or trouble understanding what people say.  T - Time. Time to call emergency services. Write down what time symptoms started.    "

## 2023-07-27 NOTE — PROGRESS NOTES
Per radiology to pause tube feed for 1 hour in order to do GI tube injection. Tube feed paused. Consent form with radiology

## 2023-08-20 ENCOUNTER — APPOINTMENT (OUTPATIENT)
Dept: RADIOLOGY | Facility: MEDICAL CENTER | Age: 78
DRG: 698 | End: 2023-08-20
Attending: STUDENT IN AN ORGANIZED HEALTH CARE EDUCATION/TRAINING PROGRAM
Payer: MEDICARE

## 2023-08-20 ENCOUNTER — HOSPITAL ENCOUNTER (INPATIENT)
Facility: MEDICAL CENTER | Age: 78
LOS: 4 days | DRG: 698 | End: 2023-08-25
Attending: STUDENT IN AN ORGANIZED HEALTH CARE EDUCATION/TRAINING PROGRAM | Admitting: STUDENT IN AN ORGANIZED HEALTH CARE EDUCATION/TRAINING PROGRAM
Payer: MEDICARE

## 2023-08-20 DIAGNOSIS — N39.0 ACUTE UTI: ICD-10-CM

## 2023-08-20 DIAGNOSIS — I95.9 HYPOTENSION, UNSPECIFIED HYPOTENSION TYPE: ICD-10-CM

## 2023-08-20 LAB
ALBUMIN SERPL BCP-MCNC: 2.6 G/DL (ref 3.2–4.9)
ALBUMIN/GLOB SERPL: 0.8 G/DL
ALP SERPL-CCNC: 455 U/L (ref 30–99)
ALT SERPL-CCNC: 132 U/L (ref 2–50)
ANION GAP SERPL CALC-SCNC: 9 MMOL/L (ref 7–16)
AST SERPL-CCNC: 82 U/L (ref 12–45)
BASOPHILS # BLD AUTO: 0.3 % (ref 0–1.8)
BASOPHILS # BLD: 0.04 K/UL (ref 0–0.12)
BILIRUB SERPL-MCNC: 1.9 MG/DL (ref 0.1–1.5)
BUN SERPL-MCNC: 24 MG/DL (ref 8–22)
CALCIUM ALBUM COR SERPL-MCNC: 9.7 MG/DL (ref 8.5–10.5)
CALCIUM SERPL-MCNC: 8.6 MG/DL (ref 8.5–10.5)
CHLORIDE SERPL-SCNC: 93 MMOL/L (ref 96–112)
CO2 SERPL-SCNC: 27 MMOL/L (ref 20–33)
CREAT SERPL-MCNC: 1.13 MG/DL (ref 0.5–1.4)
EOSINOPHIL # BLD AUTO: 0.07 K/UL (ref 0–0.51)
EOSINOPHIL NFR BLD: 0.5 % (ref 0–6.9)
ERYTHROCYTE [DISTWIDTH] IN BLOOD BY AUTOMATED COUNT: 45.7 FL (ref 35.9–50)
GFR SERPLBLD CREATININE-BSD FMLA CKD-EPI: 67 ML/MIN/1.73 M 2
GLOBULIN SER CALC-MCNC: 3.2 G/DL (ref 1.9–3.5)
GLUCOSE SERPL-MCNC: 112 MG/DL (ref 65–99)
HCT VFR BLD AUTO: 38.8 % (ref 42–52)
HGB BLD-MCNC: 13.4 G/DL (ref 14–18)
IMM GRANULOCYTES # BLD AUTO: 0.28 K/UL (ref 0–0.11)
IMM GRANULOCYTES NFR BLD AUTO: 2 % (ref 0–0.9)
LACTATE SERPL-SCNC: 3.1 MMOL/L (ref 0.5–2)
LYMPHOCYTES # BLD AUTO: 0.54 K/UL (ref 1–4.8)
LYMPHOCYTES NFR BLD: 3.8 % (ref 22–41)
MCH RBC QN AUTO: 30 PG (ref 27–33)
MCHC RBC AUTO-ENTMCNC: 34.5 G/DL (ref 32.3–36.5)
MCV RBC AUTO: 87 FL (ref 81.4–97.8)
MONOCYTES # BLD AUTO: 1.02 K/UL (ref 0–0.85)
MONOCYTES NFR BLD AUTO: 7.1 % (ref 0–13.4)
NEUTROPHILS # BLD AUTO: 12.33 K/UL (ref 1.82–7.42)
NEUTROPHILS NFR BLD: 86.3 % (ref 44–72)
NRBC # BLD AUTO: 0 K/UL
NRBC BLD-RTO: 0 /100 WBC (ref 0–0.2)
PLATELET # BLD AUTO: 172 K/UL (ref 164–446)
PMV BLD AUTO: 9.6 FL (ref 9–12.9)
POTASSIUM SERPL-SCNC: 4.6 MMOL/L (ref 3.6–5.5)
PROT SERPL-MCNC: 5.8 G/DL (ref 6–8.2)
RBC # BLD AUTO: 4.46 M/UL (ref 4.7–6.1)
SODIUM SERPL-SCNC: 129 MMOL/L (ref 135–145)
WBC # BLD AUTO: 14.3 K/UL (ref 4.8–10.8)

## 2023-08-20 PROCEDURE — 87077 CULTURE AEROBIC IDENTIFY: CPT

## 2023-08-20 PROCEDURE — 93005 ELECTROCARDIOGRAM TRACING: CPT | Performed by: STUDENT IN AN ORGANIZED HEALTH CARE EDUCATION/TRAINING PROGRAM

## 2023-08-20 PROCEDURE — 84145 PROCALCITONIN (PCT): CPT

## 2023-08-20 PROCEDURE — 700105 HCHG RX REV CODE 258: Mod: JZ | Performed by: STUDENT IN AN ORGANIZED HEALTH CARE EDUCATION/TRAINING PROGRAM

## 2023-08-20 PROCEDURE — 36415 COLL VENOUS BLD VENIPUNCTURE: CPT

## 2023-08-20 PROCEDURE — 83690 ASSAY OF LIPASE: CPT

## 2023-08-20 PROCEDURE — 99285 EMERGENCY DEPT VISIT HI MDM: CPT

## 2023-08-20 PROCEDURE — C9803 HOPD COVID-19 SPEC COLLECT: HCPCS | Performed by: STUDENT IN AN ORGANIZED HEALTH CARE EDUCATION/TRAINING PROGRAM

## 2023-08-20 PROCEDURE — 87186 SC STD MICRODIL/AGAR DIL: CPT

## 2023-08-20 PROCEDURE — 71045 X-RAY EXAM CHEST 1 VIEW: CPT

## 2023-08-20 PROCEDURE — 87086 URINE CULTURE/COLONY COUNT: CPT

## 2023-08-20 PROCEDURE — 81001 URINALYSIS AUTO W/SCOPE: CPT

## 2023-08-20 PROCEDURE — 82248 BILIRUBIN DIRECT: CPT

## 2023-08-20 PROCEDURE — 83605 ASSAY OF LACTIC ACID: CPT

## 2023-08-20 PROCEDURE — 87040 BLOOD CULTURE FOR BACTERIA: CPT | Mod: 91

## 2023-08-20 PROCEDURE — 51702 INSERT TEMP BLADDER CATH: CPT

## 2023-08-20 PROCEDURE — 85025 COMPLETE CBC W/AUTO DIFF WBC: CPT

## 2023-08-20 PROCEDURE — 700101 HCHG RX REV CODE 250: Performed by: STUDENT IN AN ORGANIZED HEALTH CARE EDUCATION/TRAINING PROGRAM

## 2023-08-20 PROCEDURE — 80053 COMPREHEN METABOLIC PANEL: CPT

## 2023-08-20 PROCEDURE — 0241U HCHG SARS-COV-2 COVID-19 NFCT DS RESP RNA 4 TRGT MIC: CPT

## 2023-08-20 PROCEDURE — 84484 ASSAY OF TROPONIN QUANT: CPT

## 2023-08-20 PROCEDURE — 303105 HCHG CATHETER EXTRA

## 2023-08-20 RX ORDER — SODIUM CHLORIDE, SODIUM LACTATE, POTASSIUM CHLORIDE, CALCIUM CHLORIDE 600; 310; 30; 20 MG/100ML; MG/100ML; MG/100ML; MG/100ML
1000 INJECTION, SOLUTION INTRAVENOUS ONCE
Status: COMPLETED | OUTPATIENT
Start: 2023-08-20 | End: 2023-08-20

## 2023-08-20 RX ORDER — LIDOCAINE HYDROCHLORIDE 20 MG/ML
JELLY TOPICAL ONCE
Status: COMPLETED | OUTPATIENT
Start: 2023-08-21 | End: 2023-08-20

## 2023-08-20 RX ADMIN — SODIUM CHLORIDE, POTASSIUM CHLORIDE, SODIUM LACTATE AND CALCIUM CHLORIDE 1000 ML: 600; 310; 30; 20 INJECTION, SOLUTION INTRAVENOUS at 23:00

## 2023-08-20 RX ADMIN — LIDOCAINE HYDROCHLORIDE 6 ML: 20 JELLY TOPICAL at 23:47

## 2023-08-20 ASSESSMENT — FIBROSIS 4 INDEX: FIB4 SCORE: 2.2

## 2023-08-21 PROBLEM — E80.6 HYPERBILIRUBINEMIA: Status: ACTIVE | Noted: 2023-08-21

## 2023-08-21 PROBLEM — R00.0 TACHYCARDIA: Status: ACTIVE | Noted: 2023-08-21

## 2023-08-21 PROBLEM — R13.10 DYSPHAGIA: Status: ACTIVE | Noted: 2023-07-19

## 2023-08-21 PROBLEM — K59.00 CONSTIPATED: Status: ACTIVE | Noted: 2023-08-21

## 2023-08-21 PROBLEM — E87.20 LACTIC ACIDOSIS: Status: ACTIVE | Noted: 2023-08-21

## 2023-08-21 PROBLEM — R74.01 TRANSAMINITIS: Status: ACTIVE | Noted: 2023-08-21

## 2023-08-21 PROBLEM — N39.0 COMPLICATED UTI (URINARY TRACT INFECTION): Status: ACTIVE | Noted: 2023-08-21

## 2023-08-21 PROBLEM — E87.1 HYPONATREMIA: Status: ACTIVE | Noted: 2023-08-21

## 2023-08-21 PROBLEM — R79.89 ELEVATED TROPONIN LEVEL NOT DUE TO ACUTE CORONARY SYNDROME: Status: ACTIVE | Noted: 2023-08-21

## 2023-08-21 PROBLEM — Z86.73 CHRONIC ISCHEMIC LEFT MCA STROKE: Status: ACTIVE | Noted: 2023-07-14

## 2023-08-21 LAB
ALBUMIN SERPL BCP-MCNC: 2.5 G/DL (ref 3.2–4.9)
ALBUMIN/GLOB SERPL: 0.8 G/DL
ALP SERPL-CCNC: 349 U/L (ref 30–99)
ALT SERPL-CCNC: 101 U/L (ref 2–50)
ANION GAP SERPL CALC-SCNC: 10 MMOL/L (ref 7–16)
APPEARANCE UR: ABNORMAL
AST SERPL-CCNC: 53 U/L (ref 12–45)
BACTERIA #/AREA URNS HPF: ABNORMAL /HPF
BASOPHILS # BLD AUTO: 0.2 % (ref 0–1.8)
BASOPHILS # BLD: 0.03 K/UL (ref 0–0.12)
BILIRUB CONJ SERPL-MCNC: 1.1 MG/DL (ref 0.1–0.5)
BILIRUB INDIRECT SERPL-MCNC: 0.8 MG/DL (ref 0–1)
BILIRUB SERPL-MCNC: 1.2 MG/DL (ref 0.1–1.5)
BILIRUB UR QL STRIP.AUTO: NEGATIVE
BUN SERPL-MCNC: 20 MG/DL (ref 8–22)
CALCIUM ALBUM COR SERPL-MCNC: 9.5 MG/DL (ref 8.5–10.5)
CALCIUM SERPL-MCNC: 8.3 MG/DL (ref 8.5–10.5)
CHLORIDE SERPL-SCNC: 95 MMOL/L (ref 96–112)
CO2 SERPL-SCNC: 25 MMOL/L (ref 20–33)
COLOR UR: ABNORMAL
CREAT SERPL-MCNC: 0.93 MG/DL (ref 0.5–1.4)
EKG IMPRESSION: NORMAL
EOSINOPHIL # BLD AUTO: 0.04 K/UL (ref 0–0.51)
EOSINOPHIL NFR BLD: 0.3 % (ref 0–6.9)
EPI CELLS #/AREA URNS HPF: NEGATIVE /HPF
ERYTHROCYTE [DISTWIDTH] IN BLOOD BY AUTOMATED COUNT: 44.5 FL (ref 35.9–50)
FLUAV RNA SPEC QL NAA+PROBE: NEGATIVE
FLUBV RNA SPEC QL NAA+PROBE: NEGATIVE
GFR SERPLBLD CREATININE-BSD FMLA CKD-EPI: 85 ML/MIN/1.73 M 2
GLOBULIN SER CALC-MCNC: 3.1 G/DL (ref 1.9–3.5)
GLUCOSE SERPL-MCNC: 107 MG/DL (ref 65–99)
GLUCOSE UR STRIP.AUTO-MCNC: NEGATIVE MG/DL
HCT VFR BLD AUTO: 36.9 % (ref 42–52)
HGB BLD-MCNC: 13.1 G/DL (ref 14–18)
HYALINE CASTS #/AREA URNS LPF: ABNORMAL /LPF
IMM GRANULOCYTES # BLD AUTO: 0.18 K/UL (ref 0–0.11)
IMM GRANULOCYTES NFR BLD AUTO: 1.2 % (ref 0–0.9)
KETONES UR STRIP.AUTO-MCNC: NEGATIVE MG/DL
LACTATE SERPL-SCNC: 2 MMOL/L (ref 0.5–2)
LACTATE SERPL-SCNC: 2.3 MMOL/L (ref 0.5–2)
LEUKOCYTE ESTERASE UR QL STRIP.AUTO: ABNORMAL
LIPASE SERPL-CCNC: 32 U/L (ref 11–82)
LYMPHOCYTES # BLD AUTO: 0.83 K/UL (ref 1–4.8)
LYMPHOCYTES NFR BLD: 5.3 % (ref 22–41)
MCH RBC QN AUTO: 30.5 PG (ref 27–33)
MCHC RBC AUTO-ENTMCNC: 35.5 G/DL (ref 32.3–36.5)
MCV RBC AUTO: 86 FL (ref 81.4–97.8)
MICRO URNS: ABNORMAL
MONOCYTES # BLD AUTO: 1.35 K/UL (ref 0–0.85)
MONOCYTES NFR BLD AUTO: 8.6 % (ref 0–13.4)
NEUTROPHILS # BLD AUTO: 13.21 K/UL (ref 1.82–7.42)
NEUTROPHILS NFR BLD: 84.4 % (ref 44–72)
NITRITE UR QL STRIP.AUTO: POSITIVE
NRBC # BLD AUTO: 0 K/UL
NRBC BLD-RTO: 0 /100 WBC (ref 0–0.2)
PH UR STRIP.AUTO: 7 [PH] (ref 5–8)
PLATELET # BLD AUTO: 168 K/UL (ref 164–446)
PMV BLD AUTO: 9.8 FL (ref 9–12.9)
POTASSIUM SERPL-SCNC: 3.5 MMOL/L (ref 3.6–5.5)
PROCALCITONIN SERPL-MCNC: 0.91 NG/ML
PROT SERPL-MCNC: 5.6 G/DL (ref 6–8.2)
PROT UR QL STRIP: 30 MG/DL
RBC # BLD AUTO: 4.29 M/UL (ref 4.7–6.1)
RBC # URNS HPF: >150 /HPF
RBC UR QL AUTO: ABNORMAL
RSV RNA SPEC QL NAA+PROBE: NEGATIVE
SARS-COV-2 RNA RESP QL NAA+PROBE: NOTDETECTED
SODIUM SERPL-SCNC: 130 MMOL/L (ref 135–145)
SP GR UR STRIP.AUTO: 1.01
SPECIMEN SOURCE: NORMAL
TROPONIN T SERPL-MCNC: 33 NG/L (ref 6–19)
TROPONIN T SERPL-MCNC: 37 NG/L (ref 6–19)
UROBILINOGEN UR STRIP.AUTO-MCNC: 2 MG/DL
WBC # BLD AUTO: 15.6 K/UL (ref 4.8–10.8)
WBC #/AREA URNS HPF: ABNORMAL /HPF

## 2023-08-21 PROCEDURE — A9270 NON-COVERED ITEM OR SERVICE: HCPCS | Performed by: STUDENT IN AN ORGANIZED HEALTH CARE EDUCATION/TRAINING PROGRAM

## 2023-08-21 PROCEDURE — 84484 ASSAY OF TROPONIN QUANT: CPT

## 2023-08-21 PROCEDURE — 93005 ELECTROCARDIOGRAM TRACING: CPT | Performed by: STUDENT IN AN ORGANIZED HEALTH CARE EDUCATION/TRAINING PROGRAM

## 2023-08-21 PROCEDURE — 700111 HCHG RX REV CODE 636 W/ 250 OVERRIDE (IP): Mod: JZ | Performed by: STUDENT IN AN ORGANIZED HEALTH CARE EDUCATION/TRAINING PROGRAM

## 2023-08-21 PROCEDURE — 700117 HCHG RX CONTRAST REV CODE 255: Performed by: STUDENT IN AN ORGANIZED HEALTH CARE EDUCATION/TRAINING PROGRAM

## 2023-08-21 PROCEDURE — 80053 COMPREHEN METABOLIC PANEL: CPT

## 2023-08-21 PROCEDURE — 36415 COLL VENOUS BLD VENIPUNCTURE: CPT

## 2023-08-21 PROCEDURE — 83605 ASSAY OF LACTIC ACID: CPT | Mod: 91

## 2023-08-21 PROCEDURE — 700102 HCHG RX REV CODE 250 W/ 637 OVERRIDE(OP): Performed by: STUDENT IN AN ORGANIZED HEALTH CARE EDUCATION/TRAINING PROGRAM

## 2023-08-21 PROCEDURE — 770020 HCHG ROOM/CARE - TELE (206)

## 2023-08-21 PROCEDURE — 96374 THER/PROPH/DIAG INJ IV PUSH: CPT

## 2023-08-21 PROCEDURE — 700105 HCHG RX REV CODE 258

## 2023-08-21 PROCEDURE — 85025 COMPLETE CBC W/AUTO DIFF WBC: CPT

## 2023-08-21 PROCEDURE — 700105 HCHG RX REV CODE 258: Mod: JZ | Performed by: STUDENT IN AN ORGANIZED HEALTH CARE EDUCATION/TRAINING PROGRAM

## 2023-08-21 PROCEDURE — 99223 1ST HOSP IP/OBS HIGH 75: CPT | Mod: AI,GC | Performed by: STUDENT IN AN ORGANIZED HEALTH CARE EDUCATION/TRAINING PROGRAM

## 2023-08-21 PROCEDURE — 700102 HCHG RX REV CODE 250 W/ 637 OVERRIDE(OP): Mod: JZ

## 2023-08-21 PROCEDURE — 74177 CT ABD & PELVIS W/CONTRAST: CPT

## 2023-08-21 PROCEDURE — A9270 NON-COVERED ITEM OR SERVICE: HCPCS | Mod: JZ

## 2023-08-21 RX ORDER — ATORVASTATIN CALCIUM 80 MG/1
80 TABLET, FILM COATED ORAL EVERY EVENING
Status: DISCONTINUED | OUTPATIENT
Start: 2023-08-21 | End: 2023-08-25 | Stop reason: HOSPADM

## 2023-08-21 RX ORDER — POLYETHYLENE GLYCOL 3350 17 G/17G
1 POWDER, FOR SOLUTION ORAL
Status: DISCONTINUED | OUTPATIENT
Start: 2023-08-21 | End: 2023-08-22

## 2023-08-21 RX ORDER — POTASSIUM CHLORIDE 20 MEQ/1
20 TABLET, EXTENDED RELEASE ORAL 2 TIMES DAILY
Status: COMPLETED | OUTPATIENT
Start: 2023-08-21 | End: 2023-08-22

## 2023-08-21 RX ORDER — METOCLOPRAMIDE 10 MG/1
10 TABLET ORAL EVERY 6 HOURS PRN
COMMUNITY

## 2023-08-21 RX ORDER — LANSOPRAZOLE 30 MG/1
30 TABLET, ORALLY DISINTEGRATING, DELAYED RELEASE ORAL DAILY
Status: DISCONTINUED | OUTPATIENT
Start: 2023-08-21 | End: 2023-08-25 | Stop reason: HOSPADM

## 2023-08-21 RX ORDER — CEFTRIAXONE 2 G/1
2000 INJECTION, POWDER, FOR SOLUTION INTRAMUSCULAR; INTRAVENOUS ONCE
Status: COMPLETED | OUTPATIENT
Start: 2023-08-21 | End: 2023-08-21

## 2023-08-21 RX ORDER — ACETAMINOPHEN 325 MG/1
650 TABLET ORAL EVERY 6 HOURS PRN
Status: DISCONTINUED | OUTPATIENT
Start: 2023-08-21 | End: 2023-08-21

## 2023-08-21 RX ORDER — ACETAMINOPHEN 325 MG/1
650 TABLET ORAL EVERY 4 HOURS PRN
COMMUNITY

## 2023-08-21 RX ORDER — AMOXICILLIN 250 MG
2 CAPSULE ORAL 2 TIMES DAILY PRN
Status: DISCONTINUED | OUTPATIENT
Start: 2023-08-21 | End: 2023-08-22

## 2023-08-21 RX ORDER — ONDANSETRON 4 MG/1
4 TABLET, ORALLY DISINTEGRATING ORAL EVERY 6 HOURS PRN
Status: DISCONTINUED | OUTPATIENT
Start: 2023-08-21 | End: 2023-08-25 | Stop reason: HOSPADM

## 2023-08-21 RX ORDER — MIDODRINE HYDROCHLORIDE 5 MG/1
2.5 TABLET ORAL ONCE
Status: COMPLETED | OUTPATIENT
Start: 2023-08-21 | End: 2023-08-21

## 2023-08-21 RX ORDER — ACETAMINOPHEN 325 MG/1
650 TABLET ORAL EVERY 4 HOURS PRN
Status: DISCONTINUED | OUTPATIENT
Start: 2023-08-21 | End: 2023-08-25 | Stop reason: HOSPADM

## 2023-08-21 RX ORDER — BISACODYL 10 MG
10 SUPPOSITORY, RECTAL RECTAL
Status: DISCONTINUED | OUTPATIENT
Start: 2023-08-21 | End: 2023-08-22

## 2023-08-21 RX ORDER — ENOXAPARIN SODIUM 100 MG/ML
40 INJECTION SUBCUTANEOUS DAILY
Status: DISCONTINUED | OUTPATIENT
Start: 2023-08-21 | End: 2023-08-25 | Stop reason: HOSPADM

## 2023-08-21 RX ORDER — ONDANSETRON 4 MG/1
4 TABLET, FILM COATED ORAL EVERY 6 HOURS PRN
COMMUNITY

## 2023-08-21 RX ORDER — ASPIRIN 81 MG/1
81 TABLET, CHEWABLE ORAL DAILY
Status: DISCONTINUED | OUTPATIENT
Start: 2023-08-21 | End: 2023-08-25 | Stop reason: HOSPADM

## 2023-08-21 RX ORDER — MIDODRINE HYDROCHLORIDE 5 MG/1
2.5 TABLET ORAL
Status: DISCONTINUED | OUTPATIENT
Start: 2023-08-21 | End: 2023-08-25 | Stop reason: HOSPADM

## 2023-08-21 RX ORDER — SODIUM CHLORIDE, SODIUM LACTATE, POTASSIUM CHLORIDE, AND CALCIUM CHLORIDE .6; .31; .03; .02 G/100ML; G/100ML; G/100ML; G/100ML
1300 INJECTION, SOLUTION INTRAVENOUS ONCE
Status: COMPLETED | OUTPATIENT
Start: 2023-08-21 | End: 2023-08-21

## 2023-08-21 RX ORDER — SODIUM CHLORIDE 9 MG/ML
1000 INJECTION, SOLUTION INTRAVENOUS ONCE
Status: COMPLETED | OUTPATIENT
Start: 2023-08-21 | End: 2023-08-21

## 2023-08-21 RX ADMIN — SODIUM CHLORIDE 1000 ML: 9 INJECTION, SOLUTION INTRAVENOUS at 08:00

## 2023-08-21 RX ADMIN — IOHEXOL 100 ML: 350 INJECTION, SOLUTION INTRAVENOUS at 00:20

## 2023-08-21 RX ADMIN — ASPIRIN 81 MG 81 MG: 81 TABLET ORAL at 06:08

## 2023-08-21 RX ADMIN — POTASSIUM CHLORIDE 20 MEQ: 1500 TABLET, EXTENDED RELEASE ORAL at 17:41

## 2023-08-21 RX ADMIN — MIDODRINE HYDROCHLORIDE 2.5 MG: 5 TABLET ORAL at 01:38

## 2023-08-21 RX ADMIN — MIDODRINE HYDROCHLORIDE 2.5 MG: 5 TABLET ORAL at 17:41

## 2023-08-21 RX ADMIN — MIDODRINE HYDROCHLORIDE 2.5 MG: 5 TABLET ORAL at 07:58

## 2023-08-21 RX ADMIN — LANSOPRAZOLE 30 MG: 30 TABLET, ORALLY DISINTEGRATING, DELAYED RELEASE ORAL at 06:08

## 2023-08-21 RX ADMIN — SODIUM CHLORIDE, POTASSIUM CHLORIDE, SODIUM LACTATE AND CALCIUM CHLORIDE 1300 ML: 600; 310; 30; 20 INJECTION, SOLUTION INTRAVENOUS at 03:16

## 2023-08-21 RX ADMIN — CEFTRIAXONE SODIUM 2000 MG: 2 INJECTION, POWDER, FOR SOLUTION INTRAMUSCULAR; INTRAVENOUS at 00:56

## 2023-08-21 RX ADMIN — MIDODRINE HYDROCHLORIDE 2.5 MG: 5 TABLET ORAL at 14:11

## 2023-08-21 RX ADMIN — ATORVASTATIN CALCIUM 80 MG: 80 TABLET, FILM COATED ORAL at 17:41

## 2023-08-21 RX ADMIN — ENOXAPARIN SODIUM 40 MG: 100 INJECTION SUBCUTANEOUS at 17:41

## 2023-08-21 ASSESSMENT — LIFESTYLE VARIABLES
EVER FELT BAD OR GUILTY ABOUT YOUR DRINKING: NO
DOES PATIENT WANT TO STOP DRINKING: NO
HAVE PEOPLE ANNOYED YOU BY CRITICIZING YOUR DRINKING: NO
AVERAGE NUMBER OF DAYS PER WEEK YOU HAVE A DRINK CONTAINING ALCOHOL: 0
ALCOHOL_USE: NO
TOTAL SCORE: 0
HOW MANY TIMES IN THE PAST YEAR HAVE YOU HAD 5 OR MORE DRINKS IN A DAY: 0
CONSUMPTION TOTAL: NEGATIVE
TOTAL SCORE: 0
ON A TYPICAL DAY WHEN YOU DRINK ALCOHOL HOW MANY DRINKS DO YOU HAVE: 0
EVER HAD A DRINK FIRST THING IN THE MORNING TO STEADY YOUR NERVES TO GET RID OF A HANGOVER: NO
HAVE YOU EVER FELT YOU SHOULD CUT DOWN ON YOUR DRINKING: NO
TOTAL SCORE: 0

## 2023-08-21 ASSESSMENT — COGNITIVE AND FUNCTIONAL STATUS - GENERAL
STANDING UP FROM CHAIR USING ARMS: A LOT
TOILETING: A LOT
EATING MEALS: A LOT
SUGGESTED CMS G CODE MODIFIER MOBILITY: CL
CLIMB 3 TO 5 STEPS WITH RAILING: A LOT
DRESSING REGULAR UPPER BODY CLOTHING: A LOT
HELP NEEDED FOR BATHING: A LOT
MOVING TO AND FROM BED TO CHAIR: A LOT
MOVING FROM LYING ON BACK TO SITTING ON SIDE OF FLAT BED: A LOT
TURNING FROM BACK TO SIDE WHILE IN FLAT BAD: A LOT
DRESSING REGULAR LOWER BODY CLOTHING: A LOT
MOBILITY SCORE: 12
PERSONAL GROOMING: A LOT
WALKING IN HOSPITAL ROOM: A LOT
SUGGESTED CMS G CODE MODIFIER DAILY ACTIVITY: CL
DAILY ACTIVITIY SCORE: 12

## 2023-08-21 ASSESSMENT — ENCOUNTER SYMPTOMS
FEVER: 0
DIZZINESS: 0
FLANK PAIN: 0
WEAKNESS: 0
CONSTIPATION: 0
WEIGHT LOSS: 0
MYALGIAS: 0
SHORTNESS OF BREATH: 0
NAUSEA: 0
PALPITATIONS: 0
CHILLS: 0
VOMITING: 0
COUGH: 0
DEPRESSION: 0
DIARRHEA: 0

## 2023-08-21 ASSESSMENT — FIBROSIS 4 INDEX: FIB4 SCORE: 2.35

## 2023-08-21 ASSESSMENT — PATIENT HEALTH QUESTIONNAIRE - PHQ9
SUM OF ALL RESPONSES TO PHQ9 QUESTIONS 1 AND 2: 0
2. FEELING DOWN, DEPRESSED, IRRITABLE, OR HOPELESS: NOT AT ALL
1. LITTLE INTEREST OR PLEASURE IN DOING THINGS: NOT AT ALL

## 2023-08-21 ASSESSMENT — PAIN DESCRIPTION - PAIN TYPE: TYPE: ACUTE PAIN

## 2023-08-21 NOTE — ED NOTES
Pt resting in bed, monitor connected, side rails up, call light in reach, no acute distress, VSS. IV fluids infusing.

## 2023-08-21 NOTE — ASSESSMENT & PLAN NOTE
This is Sepsis Present on admission  SIRS criteria identified on my evaluation include: Tachycardia, with heart rate greater than 90 BPM, Tachypnea, with respirations greater than 20 per minute and Leukocytosis, with WBC greater than 12,000  Clinical indicators of end organ dysfunction include Lactic Acid greater than 2  Source is urinary  Sepsis protocol initiated    -Complete sepsis protocol fluids with additional 1.3L bolus LR  -Monitor hemodynamic status  -Cont home midodrine-- possible that some degree of pt's hypotension is from autonomic dysregulation from his CV  -Urine culture grew Klebseilla aerogeneses on 8/22/23  -Continue current antibiotics cefepime

## 2023-08-21 NOTE — ASSESSMENT & PLAN NOTE
Admission T bili 1.9 with CT abd/ pelvis without signs of GB inflammation and no physical exam findings of any cholecystic etiologies for sepsis at this time (I.e. acute ascending cholangitis etc)    -Resolved

## 2023-08-21 NOTE — DISCHARGE PLANNING
Received Choice form @: 3820  Agency/Facility Name: [facility]  Referral sent per Choice form @: 4025

## 2023-08-21 NOTE — H&P
"[facility] History & Physical Note    Date of Service  8/21/2023    [facility] Team: [facility] Night Admitting Team  Attending: Dr. Medellin  Senior Resident: Dr. Nugent  Contact Number: [phone]    Primary Care Physician  Pcp Pt States None    Consultants  None    Code Status  DNAR/DNI    Chief Complaint  Chief Complaint   Patient presents with    Hypotension     BIB[facility] from [facility] for sepsis symptoms   Opn EMS arrival BP 80/50, 128 heart rate, 28 resp, 100.1 fever   Pt recently treated for UTI  Pt given 400ml LR,   Baseline aphasia, R hemiplegia, Peg tube     History of Presenting Illness (HPI):     Brenton Harvey is a 77 y.o. 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     Communication with pt was done largely with yes or no questions given his aphasia.     Patient notes dysuria and inc urinary frequency over the past week. Denies ever having had these sx or a urinary tract infection in that past. Denies all other ROS symptoms including flank pain, hematuria, urinary dribbling, fevers, chills, and suprapubic pain. Spoke to patient daughter Haley and updated her regarding pt admission per pt request.     Vitals  BP 97/63, , RR 24, Spo2 95 on RA    Labs  WBC 14.3 with ANC 12.33, Hgb 13.4 with MCV 87 (BL Hgb ~15), Na 129, Cl 93, AST 82, , alk phos 455, tbili 1.9, lactic 3.1 --> 2.3. Troponin T 37. UA cloudy with large occult blood and pos nitrites in addition to large leuk esterase and packed WBC + many bacteria. Resp viral panel negative.     Imaging  CT AP w/ moderate stool burden and enlarged prostate. Otherwise notable for atherosclerotic CAD and bibasilar atelectasis.  XRAY WNL   EKG sinus tachycardia    In the ED, blood cultures were drawn and pt had ann placed and received ceftriaxone 2g x 1 dose. He also received 1L LR bolus and a dose of home midodrine (PM " dose). S/p interventions pt with good blood pressure recovery and MAP well above 65. Patient admitted to telemetry for sepsis mgmt 2/2 urinary source.     I discussed the plan of care with patient.    Review of Systems  Review of Systems   Constitutional:  Negative for chills, fever and weight loss.   Respiratory:  Negative for cough and shortness of breath.    Cardiovascular:  Negative for chest pain, palpitations and leg swelling.   Gastrointestinal:  Negative for constipation, diarrhea, nausea and vomiting.   Genitourinary:  Positive for dysuria and frequency. Negative for flank pain, hematuria and urgency.   Musculoskeletal:  Negative for myalgias.   Neurological:  Negative for dizziness and weakness.   Psychiatric/Behavioral:  Negative for depression.    All other systems reviewed and are negative.    Past Medical History   has a past medical history of CVA (cerebral vascular accident) (HCC).    Surgical History   has a past surgical history that includes pr place percut gastrostomy tube (N/A, 2023) and pr upper gi endoscopy,diagnosis (N/A, 2023).     Family History  family history is not on file.   Family history reviewed with patient.     Social History  Tobacco: no  Alcohol: no  Recreational drugs (illegal or prescription): no  Employment: no  Living Situation: currently   Recent Travel: no  Primary Care Provider: Reviewed      Allergies  No Known Allergies    Medications  Prior to Admission Medications   Prescriptions Last Dose Informant Patient Reported? Taking?   aspirin (ASA) 81 MG Chew Tab chewable tablet   No No   Si Tablet by Enteral Tube route every day.   atorvastatin (LIPITOR) 80 MG tablet   No No   Si Tablet by Enteral Tube route every evening.   clopidogrel (PLAVIX) 75 MG Tab   No No   Si Tablet by Enteral Tube route every day.   midodrine (PROAMATINE) 2.5 MG Tab   No No   Si Tablet by Enteral Tube route 3 times a day with meals.   omeprazole (PRILOSEC) 40 MG  delayed-release capsule   No No   Sig: Take 1 Capsule by mouth every day.      Facility-Administered Medications: None     Physical Exam  Temp:  [37.4 °C (99.3 °F)] 37.4 °C (99.3 °F)  Pulse:  [111-127] 123  Resp:  [15-24] 21  BP: ()/(63-70) 99/70  SpO2:  [93 %-95 %] 95 %  Blood Pressure : 99/70   Temperature: 37.4 °C (99.3 °F)   Pulse: (!) 123   Respiration: (!) 21   Pulse Oximetry: 95 %     Physical Exam  Constitutional:       General: He is not in acute distress.     Appearance: Normal appearance. He is not ill-appearing or toxic-appearing.      Comments: Minimally verbal but with good understanding of events   HENT:      Head: Normocephalic and atraumatic.      Mouth/Throat:      Mouth: Mucous membranes are dry.   Eyes:      Extraocular Movements: Extraocular movements intact.      Pupils: Pupils are equal, round, and reactive to light.   Cardiovascular:      Rate and Rhythm: Regular rhythm. Tachycardia present.      Pulses: Normal pulses.      Heart sounds: Normal heart sounds. No murmur heard.     No friction rub. No gallop.   Pulmonary:      Effort: No respiratory distress.      Breath sounds: No stridor. No wheezing or rales.   Abdominal:      General: There is no distension.      Palpations: There is no mass.      Tenderness: There is no abdominal tenderness. There is no right CVA tenderness, left CVA tenderness, guarding or rebound.   Musculoskeletal:         General: No swelling or tenderness.      Cervical back: No rigidity or tenderness.      Right lower leg: No edema.      Left lower leg: No edema.   Skin:     Coloration: Skin is not jaundiced or pale.      Findings: No erythema.   Neurological:      General: No focal deficit present.      Mental Status: He is alert and oriented to person, place, and time.      Comments: A&Ox3 with yes and no questioning   Psychiatric:         Mood and Affect: Mood normal.         Behavior: Behavior normal.       Laboratory:  Recent Labs     08/20/23  2255   WBC  "14.3*   RBC 4.46*   HEMOGLOBIN 13.4*   HEMATOCRIT 38.8*   MCV 87.0   MCH 30.0   MCHC 34.5   RDW 45.7   PLATELETCT 172   MPV 9.6     Recent Labs     08/20/23  2255   SODIUM 129*   POTASSIUM 4.6   CHLORIDE 93*   CO2 27   GLUCOSE 112*   BUN 24*   CREATININE 1.13   CALCIUM 8.6     Recent Labs     08/20/23  2255   ALTSGPT 132*   ASTSGOT 82*   ALKPHOSPHAT 455*   TBILIRUBIN 1.9*   DBILIRUBIN 1.1*   LIPASE 32   GLUCOSE 112*         No results for input(s): \"NTPROBNP\" in the last 72 hours.      Recent Labs     08/20/23  2255   TROPONINT 37*       Imaging:  CT-ABDOMEN-PELVIS WITH   Final Result         1.  Moderate quantity of stool in the colon, appearance favoring changes of constipation.   2.  Enlarged prostate, consider causes of prostate enlargement with additional workup as clinically appropriate.   3.  Hazy and linear densities the bilateral lung bases, appearance suggests atelectasis although component of infiltrate is not excluded.   4.  Atherosclerosis and atherosclerotic coronary artery disease      DX-CHEST-PORTABLE (1 VIEW)   Final Result         1.  No acute cardiopulmonary disease.        X-Ray:  My impression is: WNL    Assessment/Plan:    Pt is a 78 yo M with pertinent Pmhx of HTN and MCA stroke 7/2023 c/b residual aphasia and R hemiparesis presenting for sepsis 2/2 UTI.     I anticipate this patient will require at least two midnights for appropriate medical management, necessitating inpatient admission because of sepsis    Patient will need a Med/Surg bed on TELEMETRY service . The need is secondary to sepsis.    * Sepsis (HCC)- (present on admission)  Assessment & Plan  #Leukocytosis    This is Sepsis Present on admission  SIRS criteria identified on my evaluation include: Tachycardia, with heart rate greater than 90 BPM, Tachypnea, with respirations greater than 20 per minute and Leukocytosis, with WBC greater than 12,000  Clinical indicators of end organ dysfunction include Lactic Acid greater than " "2  Source is urinary  Sepsis protocol initiated  Crystalloid Fluid Administration: Fluid resuscitation ordered per standard protocol - 30 mL/kg per current or ideal body weight  IV antibiotics as appropriate for source of sepsis  Reassessment: I have reassessed the patient's hemodynamic status    CXR negative for acute process. CT AP w/ without concern for acute biliary source of infection given elevated alk phos/ t bili    -See \"Complicated UTI\"  -Complete sepsis protocol fluids with additional 1.3L bolus LR  -Procal, bcx, ucx  -Monitor hemodynamic status  -Cont home midodrine-- possible that some degree of pt's hypotension is from autonomic dysregulation from his CVA    Complicated UTI (urinary tract infection)- (present on admission)  Assessment & Plan  Dysuria + inc urinary frequency x 1 week with UA showing acute infection. Neg suprapubic tenderness and neg CVT b/l  S/p Ellison placement in ED     -Ceftriaxone 2g for minimum 7 days   -Bcx, Ucx    Elevated troponin level not due to acute coronary syndrome- (present on admission)  Assessment & Plan  Trop T 37 on admission and EKG without ischemic findings. Likely type 2 NSTEMI/ demand ischemia in setting of sepsis as above.    -Trend trop and EKG    Lactic acidosis- (present on admission)  Assessment & Plan  IMPROVING  Lactic 3.1 --> 2.3 2/2 sepsis     Hyperbilirubinemia- (present on admission)  Assessment & Plan  Admission T bili 1.9 with CT abd/ pelvis without signs of GB inflammation and no physical exam findings of any cholecystic etiologies for sepsis at this time (I.e. acute ascending cholangitis etc)    -D bili  -Monitor on daily CMP    Transaminitis- (present on admission)  Assessment & Plan  , AST 82-- downtrending from last admission. Patient without abdominal discomfort/ Vang sign  DDx: possible some component of mild sepsis/ shock liver (typically higher values..)    -Monitor with daily CMP    Hyponatremia- (present on admission)  Assessment & " Plan  Admission Na 129, Cl 93 with dry mucus membranes on exam  DDx: likely hypovol hypoNa in setting of sepsis    -Fluid repletion per sepsis protocol   -Monitor with daily CMP    Constipated- (present on admission)  Assessment & Plan  Per CT AP this admission, pt high stool burden. 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  PGY-3 [facility]
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
## 2023-08-21 NOTE — ASSESSMENT & PLAN NOTE
Admission Na 129, Cl 93 with dry mucus membranes on exam, likely hypovol hypoNa in setting of sepsis    -Fluid repletion per sepsis protocol   -Monitor with daily CMP  -8/23 Na 133

## 2023-08-21 NOTE — ASSESSMENT & PLAN NOTE
Per CT AP this admission, pt high stool burden. On questioning, denies constipation     -PRN bowel reg- consider scheduling if pt unable to pass BM

## 2023-08-21 NOTE — ED NOTES
Med Rec complete per Pt's MAR from [facility].  Allergies reviewed.  Home Pharmacy:  [facility]

## 2023-08-21 NOTE — ASSESSMENT & PLAN NOTE
HR 120s on admission-- sinus on EKG, likely reflex tachycardia 2/2 distributive shock.    -Monitor on telemetry

## 2023-08-21 NOTE — ASSESSMENT & PLAN NOTE
Dysuria + inc urinary frequency x 1 week with UA showing acute infection. Neg suprapubic tenderness, does have CVA tenderness today 8/23/23  S/p Ellison placement in ED     -Ceftriaxone 2g for minimum 7 days   -Blood culture grew no growth, Urine culture grew Klebseilla aerogeneses>100,000 on 8/22/23, resistant to ceftriaxone, we will stop it and start IV cefepime during inpatient stay. Switched to oral bactrim on d/c.

## 2023-08-21 NOTE — PROGRESS NOTES
2 RN Skin Assessment Completed by Elodia RN and Champ RN.    Head: WDL  Ears: blanching  Nose: WDL  Mouth: WDL  Neck: WDL  Breasts/Chest: WDL  Shoulder Blades: WDL  Spine: redness and blanching  (R) Arm/Elbow/hand: redness and blanching  (L) Arm/Elbow/hand: WDL  Abdomen:WDL  Groin: WDL  Sacrum/Coccyx/Buttocks: redness and blanching, pressure ulcer coccyx  (R) Leg: WDL  (L) Leg: WDL  (R) Heel/Foot/Toe: redness and blanching  (L) Heel/Foot/Toe: redness and blanching          Devices in place: Tele Box, BP Cuff, and Ellison Temperature    Interventions in place: Heel Mepilex, Sacral Mepilex, Waffle overlay, Pillows, and Q2 turns    Possible skin injury found: Yes    Pictures uploaded into Epic: Yes  Wound Consult Placed: Yes

## 2023-08-21 NOTE — ED NOTES
Pt normally takes Jevity 1.5 through an NG tube but recently was not tolerating PO intake.    Ventricular Rate : 74  Atrial Rate : 74  P-R Interval : 168  QRS Duration : 112  Q-T Interval : 396  QTC Calculation(Bazett) : 439  P Axis : 63  R Axis : -30  T Axis : 1  Diagnosis : Normal sinus rhythm  Left axis deviation  Minimal voltage criteria for LVH, may be normal variant  ****Abnormal ECG****  When compared with ECG of 05-DEC-2019 19:49,  Fusion complexes are no longer Present  Confirmed by ARI SYKES, BOSSMAN ([id number]) on 10/2/2020 3:26:39 PM

## 2023-08-21 NOTE — ASSESSMENT & PLAN NOTE
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

## 2023-08-21 NOTE — PROGRESS NOTES
"Hospital Medicine Daily Progress Note    Date of Service  8/21/2023    Chief Complaint  Amanda Greenberg is a 77 y.o. male admitted 8/20/2023 with sepsis    Hospital Course  Brenton Harvey is a 77 y.o. male with Pmhx of HTN and L MCA territory CVA 7/2023 c/b residual aphasia and R hemiparesis who presented 8/20/2023 via EMS from [facility] for \"sepsis symptoms\".      Communication with pt was done largely with yes or no questions given his aphasia.      Patient notes dysuria and inc urinary frequency over the past week. Denies ever having had these sx or a urinary tract infection in that past. Denies all other ROS symptoms including flank pain, hematuria, urinary dribbling, fevers, chills, and suprapubic pain. Spoke to patient daughter Haley and updated her regarding pt admission per pt request.    Imaging  CT AP w/ moderate stool burden and enlarged prostate. Otherwise notable for atherosclerotic CAD and bibasilar atelectasis.  XRAY WNL   EKG sinus tachycardia     In the ED, blood cultures were drawn and pt had ann placed and received ceftriaxone 2g x 1 dose. He also received 1L LR bolus and a dose of home midodrine (PM dose). S/p interventions pt with good blood pressure recovery and MAP well above 65. Patient admitted to telemetry for sepsis mgmt 2/2 urinary source.     Interval Problem Update  8/21 afebrile, slightly hypotensive and tachycardic, on room air.  Received a second 1 L bolus this morning and blood pressure improved.  White count still up at 15.  Sodium improving, potassium is low, will replace.  LFTs improving.  Lactic acid trending down.    I have discussed this patient's plan of care and discharge plan at IDT rounds today with Case Management, Nursing, Nursing leadership, and other members of the IDT team.    Consultants/Specialty  none    Code Status  DNAR/DNI    Disposition  The patient is not medically cleared for discharge to home or a post-acute facility.  Anticipate discharge to: " skilled nursing facility    I have placed the appropriate orders for post-discharge needs.    Review of Systems  Review of Systems   Unable to perform ROS: Patient nonverbal        Physical Exam  Temp:  [37.4 °C (99.3 °F)-37.5 °C (99.5 °F)] 37.5 °C (99.5 °F)  Pulse:  [108-128] 118  Resp:  [15-24] 18  BP: ()/(61-77) 98/71  SpO2:  [89 %-95 %] 95 %    Physical Exam  Vitals and nursing note reviewed.   Constitutional:       Appearance: Normal appearance. He is not ill-appearing.   HENT:      Head: Normocephalic and atraumatic.      Jaw: There is normal jaw occlusion.      Right Ear: Hearing normal.      Left Ear: Hearing normal.      Nose: Nose normal.      Mouth/Throat:      Lips: Pink.      Mouth: Mucous membranes are moist.   Eyes:      Extraocular Movements: Extraocular movements intact.      Conjunctiva/sclera: Conjunctivae normal.      Pupils: Pupils are equal, round, and reactive to light.   Neck:      Vascular: No carotid bruit.   Cardiovascular:      Rate and Rhythm: Normal rate and regular rhythm.      Pulses: Normal pulses.      Heart sounds: Normal heart sounds, S1 normal and S2 normal.   Pulmonary:      Effort: Pulmonary effort is normal.      Breath sounds: Normal breath sounds and air entry. No stridor.   Abdominal:      General: Bowel sounds are normal.      Palpations: Abdomen is soft.      Tenderness: There is no abdominal tenderness.   Musculoskeletal:      Cervical back: Normal range of motion and neck supple.      Right lower leg: No edema.      Left lower leg: No edema.   Skin:     General: Skin is warm and dry.      Capillary Refill: Capillary refill takes less than 2 seconds.   Neurological:      General: No focal deficit present.      Mental Status: He is alert and oriented to person, place, and time. Mental status is at baseline.      Sensory: Sensation is intact.      Motor: Motor function is intact.   Psychiatric:         Attention and Perception: Attention and perception normal.          Mood and Affect: Mood and affect normal.         Speech: Speech normal.         Behavior: Behavior normal. Behavior is cooperative.         Fluids    Intake/Output Summary (Last 24 hours) at 8/21/2023 1154  Last data filed at 8/21/2023 1000  Gross per 24 hour   Intake 2030 ml   Output 0 ml   Net 2030 ml       Laboratory  Recent Labs     08/20/23 2255 08/21/23  0311   WBC 14.3* 15.6*   RBC 4.46* 4.29*   HEMOGLOBIN 13.4* 13.1*   HEMATOCRIT 38.8* 36.9*   MCV 87.0 86.0   MCH 30.0 30.5   MCHC 34.5 35.5   RDW 45.7 44.5   PLATELETCT 172 168   MPV 9.6 9.8     Recent Labs     08/20/23 2255 08/21/23  0837   SODIUM 129* 130*   POTASSIUM 4.6 3.5*   CHLORIDE 93* 95*   CO2 27 25   GLUCOSE 112* 107*   BUN 24* 20   CREATININE 1.13 0.93   CALCIUM 8.6 8.3*                   Imaging  CT-ABDOMEN-PELVIS WITH   Final Result         1.  Moderate quantity of stool in the colon, appearance favoring changes of constipation.   2.  Enlarged prostate, consider causes of prostate enlargement with additional workup as clinically appropriate.   3.  Hazy and linear densities the bilateral lung bases, appearance suggests atelectasis although component of infiltrate is not excluded.   4.  Atherosclerosis and atherosclerotic coronary artery disease      DX-CHEST-PORTABLE (1 VIEW)   Final Result         1.  No acute cardiopulmonary disease.           Assessment/Plan  * Sepsis (HCC)- (present on admission)  Assessment & Plan  #Leukocytosis    This is Sepsis Present on admission  SIRS criteria identified on my evaluation include: Tachycardia, with heart rate greater than 90 BPM, Tachypnea, with respirations greater than 20 per minute and Leukocytosis, with WBC greater than 12,000  Clinical indicators of end organ dysfunction include Lactic Acid greater than 2  Source is urinary  Sepsis protocol initiated  Crystalloid Fluid Administration: Fluid resuscitation ordered per standard protocol - 30 mL/kg per current or ideal body weight  IV antibiotics as  "appropriate for source of sepsis  Reassessment: I have reassessed the patient's hemodynamic status    CXR negative for acute process. CT AP w/ without concern for acute biliary source of infection given elevated alk phos/ t bili    -See \"Complicated UTI\"  -Complete sepsis protocol fluids with additional 1.3L bolus LR  -Procal, bcx, ucx  -Monitor hemodynamic status  -Cont home midodrine-- possible that some degree of pt's hypotension is from autonomic dysregulation from his CVA    Tachycardia- (present on admission)  Assessment & Plan  HR 120s on admission-- sinus on EKG   DDx: likely sinus in setting of sepsis 2/2 UTI    -Monitor on telemetry    Complicated UTI (urinary tract infection)- (present on admission)  Assessment & Plan  Dysuria + inc urinary frequency x 1 week with UA showing acute infection. Neg suprapubic tenderness and neg CVT b/l  S/p Ellison placement in ED     -Ceftriaxone 2g for minimum 7 days   -Bcx, Ucx    Elevated troponin level not due to acute coronary syndrome- (present on admission)  Assessment & Plan  Trop T 37 on admission and EKG without ischemic findings. Likely type 2 NSTEMI/ demand ischemia in setting of sepsis as above.    -Trend trop and EKG    Lactic acidosis- (present on admission)  Assessment & Plan  IMPROVING  Lactic 3.1 --> 2.3 2/2 sepsis     Transaminitis- (present on admission)  Assessment & Plan  , AST 82-- downtrending from last admission. 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 acute ascending cholangitis etc)    -D bili  -Monitor on daily CMP    Constipated- (present on admission)  Assessment & Plan  Per CT AP this admission, pt high stool burden. On questioning, denies constipation     -PRN bowel reg- consider scheduling if pt unable to pass BM         VTE prophylaxis:    enoxaparin ppx      I have performed a physical exam and reviewed and updated ROS and Plan today (8/21/2023). In review of yesterday's note (8/20/2023), there are no changes except as documented above.

## 2023-08-21 NOTE — ASSESSMENT & PLAN NOTE
Trop T 37 on admission and EKG without ischemic findings. Likely type 2 NSTEMI/ demand ischemia in setting of sepsis as above.    -Continue telemetry

## 2023-08-21 NOTE — ED TRIAGE NOTES
"Chief Complaint   Patient presents with    Hypotension     BIBA from [facility] for sepsis symptoms   Opn EMS arrival BP 80/50, 128 heart rate, 28 resp, 100.1 fever   Pt recently treated for UTI  Pt given 400ml LR,   Baseline aphasia, R hemiplegia, Peg tube     BP 97/63   Pulse (!) 127   Resp (!) 24   Ht 1.905 m (6' 3\")   Wt 77.1 kg (170 lb)   SpO2 95%   BMI 21.25 kg/m²     "

## 2023-08-21 NOTE — ASSESSMENT & PLAN NOTE
, AST 82-- downtrending from last admission. Patient without abdominal discomfort/ Vang sign  DDx: possible some component of mild sepsis/ shock liver (typically higher values..)    -Monitor with daily CMP  -towards improvement on daily CMP

## 2023-08-21 NOTE — ED NOTES
PT IS SLEEPING. BREATHING IS EVEN AND UNLABORED, SKIN IS WARM AND DRY, PT IS HYPOTENSIVE, PT READJUSTED IN BED AND VITALS RETAKEN MD TEXTED FOR ORDERS FOR CONTINUED HYPOTENSION, NAD, WILL CONTINUE TO MONITOR.

## 2023-08-21 NOTE — ED NOTES
PT LINEN CHANGED AND DIAPER CHANGED WITH SMALL BM. PT WITH SOME REDNESS INSIDE GLUTEUS AREA. PATIENT ADJUSTED IN BED FOR COMFORT. PATIENT BREATHING IS EVEN AND UNLABORED, SKIN IS WARM AND DRY, VSS, NAD, WILL CONTINUE TO MONITOR.

## 2023-08-21 NOTE — ED PROVIDER NOTES
"ED Provider Note    CHIEF COMPLAINT  Chief Complaint   Patient presents with    Hypotension     BIBA from [facility] for sepsis symptoms   Opn EMS arrival BP 80/50, 128 heart rate, 28 resp, 100.1 fever   Pt recently treated for UTI  Pt given 400ml LR,   Baseline aphasia, R hemiplegia, Peg tube       EXTERNAL RECORDS REVIEWED  Inpatient Notes      HPI/ROS  LIMITATION TO HISTORY   Select: Aphasia  OUTSIDE HISTORIAN(S):  EMS      Brenton Harvey is a 77 y.o. male who presents with low blood pressure.  Patient at skilled nursing facility recovering from MCA stroke with baseline aphasia and right-sided hemiplegia.  Patient reportedly hypotensive tachycardic today.  No other additional history from patient or facility.    PAST MEDICAL HISTORY   has a past medical history of CVA (cerebral vascular accident) (HCC).    SURGICAL HISTORY   has a past surgical history that includes place percut gastrostomy tube (N/A, 7/24/2023) and upper gi endoscopy,diagnosis (N/A, 7/24/2023).    FAMILY HISTORY  No family history on file.    SOCIAL HISTORY  Social History     Tobacco Use    Smoking status: Not on file    Smokeless tobacco: Not on file   Substance and Sexual Activity    Alcohol use: Not on file    Drug use: Not on file    Sexual activity: Not on file       CURRENT MEDICATIONS  Home Medications    **Home medications have not yet been reviewed for this encounter**         ALLERGIES  No Known Allergies    PHYSICAL EXAM  VITAL SIGNS: BP 99/70   Pulse (!) 123   Temp 37.4 °C (99.3 °F) (Axillary)   Resp (!) 21   Ht 1.905 m (6' 3\")   Wt 77.1 kg (170 lb)   SpO2 95%   BMI 21.25 kg/m²    Chronically ill-appearing, aphasic, dry mucous membranes, clear bilateral breath sounds, normal heart sounds, present equal peripheral pulses in all 4 extremities, abdomen soft nontender, gastric tube in place without surrounding induration or erythema, right-sided hemiplegia    DIAGNOSTIC STUDIES / PROCEDURES  EKG  I have independently " interpreted this EKG  Results for orders placed or performed during the hospital encounter of 23   EKG (Now)   Result Value Ref Range    Report       [facility] Emergency Dept[facility]    Test Date:  2023  Pt Name:    KOBE SONG               Department: ER  MRN:        [id number]                      Room:       RD 02  Gender:     Male                         Technician: [id number]  :        1945                   Requested By:VALENTINA MARTINEZ  Order #:    [id number]                    Reading MD:    Measurements  Intervals                                Axis  Rate:       124                          P:          69  KY:         158                          QRS:        -47  QRSD:       83                           T:          73  QT:         317  QTc:        456    Interpretive Statements  Fast sinus arrhythmia  LAD, consider left anterior fascicular block  Compared to ECG 2023 07:06:38  Sinus tachycardia no longer present  Atrial premature complex(es) no longer present          LABS  Labs Reviewed   LACTIC ACID - Abnormal; Notable for the following components:       Result Value    Lactic Acid 3.1 (*)     All other components within normal limits   CBC WITH DIFFERENTIAL - Abnormal; Notable for the following components:    WBC 14.3 (*)     RBC 4.46 (*)     Hemoglobin 13.4 (*)     Hematocrit 38.8 (*)     Neutrophils-Polys 86.30 (*)     Lymphocytes 3.80 (*)     Immature Granulocytes 2.00 (*)     Neutrophils (Absolute) 12.33 (*)     Lymphs (Absolute) 0.54 (*)     Monos (Absolute) 1.02 (*)     Immature Granulocytes (abs) 0.28 (*)     All other components within normal limits   COMP METABOLIC PANEL - Abnormal; Notable for the following components:    Sodium 129 (*)     Chloride 93 (*)     Glucose 112 (*)     Bun 24 (*)     AST(SGOT) 82 (*)     ALT(SGPT) 132 (*)     Alkaline Phosphatase 455 (*)     Total Bilirubin 1.9 (*)     Albumin 2.6 (*)     Total Protein 5.8 (*)     All other  "components within normal limits   URINALYSIS - Abnormal; Notable for the following components:    Color Orange (*)     Character Cloudy (*)     Protein 30 (*)     Nitrite Positive (*)     Leukocyte Esterase Large (*)     Occult Blood Large (*)     All other components within normal limits    Narrative:     Release to patient->Immediate  Indication for culture:->Evaluation for sepsis without a  clear source of infection   URINE MICROSCOPIC (W/UA) - Abnormal; Notable for the following components:    WBC Packed (*)     RBC >150 (*)     Bacteria Many (*)     All other components within normal limits    Narrative:     Release to patient->Immediate  Indication for culture:->Evaluation for sepsis without a  clear source of infection   COV-2, FLU A/B, AND RSV BY PCR ([place])    Narrative:     Release to patient->Immediate   ESTIMATED GFR   LACTIC ACID    Narrative:     Repeat if initial lactic acid result is greater than 2   LACTIC ACID   URINE CULTURE(NEW)    Narrative:     Release to patient->Immediate  Indication for culture:->Evaluation for sepsis without a  clear source of infection   BLOOD CULTURE    Narrative:     Per Hospital Policy: Only change Specimen Src: to \"Line\" if  specified by physician order.  Release to patient->Immediate   BLOOD CULTURE    Narrative:     Per Hospital Policy: Only change Specimen Src: to \"Line\" if  specified by physician order.  Release to patient->Immediate   LIPASE   TROPONIN   LIPASE   TROPONIN        RADIOLOGY  I have independently interpreted the diagnostic imaging associated with this visit and am waiting the final reading from the radiologist.   My preliminary interpretation is as follows: No opacity or pneumothorax  Radiologist interpretation:   CT-ABDOMEN-PELVIS WITH   Final Result         1.  Moderate quantity of stool in the colon, appearance favoring changes of constipation.   2.  Enlarged prostate, consider causes of prostate enlargement with additional workup as clinically " appropriate.   3.  Hazy and linear densities the bilateral lung bases, appearance suggests atelectasis although component of infiltrate is not excluded.   4.  Atherosclerosis and atherosclerotic coronary artery disease      DX-CHEST-PORTABLE (1 VIEW)   Final Result         1.  No acute cardiopulmonary disease.            COURSE & MEDICAL DECISION MAKING    ED Observation Status?     INITIAL ASSESSMENT, COURSE AND PLAN  Care Narrative: Differential includes septic shock, pneumonia, UTI, diverticulitis, appendicitis, obstruction, heart failure.  Will obtain chest x-ray to assess for pneumonia or pulmonary edema.  Given patient's recent gastrostomy tube placement and undifferentiated hypotension will obtain CT of chest pelvis to defy possible infectious process.  Will obtain blood cultures, COVID swab, urine culture from new urinary catheter.    Chest x-ray without signs of pneumonia or pulmonary edema.  Blood pressure improved after IV fluids, suspect patient has baseline hypotension we we will give him evening dose of midodrine.  Blood work notable for intermediate range lactic acid and leukocytosis.  Urinalysis with signs of infection.  Patient dosed with ceftriaxone.  Spoke with hospitalist regarding admission for management of UTI, possible sepsis.    CRITICAL CARE  The very real possibilty of a deterioration of this patient's condition required the highest level of my preparedness for sudden, emergent intervention.  I provided critical care services, which included medication orders, frequent reevaluations of the patient's condition and response to treatment, ordering and reviewing test results, and discussing the case with various consultants.  The critical care time associated with the care of the patient was 40 minutes. Review chart for interventions. This time is exclusive of any other billable procedures.           ADDITIONAL PROBLEM LIST    DISPOSITION AND DISCUSSIONS  I have discussed management of the  patient with the following physicians and TRENA's: Hospitalist      Decision tools and prescription drugs considered including, but not limited to: Antibiotics   .    FINAL DIAGNOSIS  1. Acute UTI    2. Hypotension, unspecified hypotension type           Electronically signed by: Jeovanny Winston D.O., 8/21/2023 1:56 AM

## 2023-08-22 LAB
ALBUMIN SERPL BCP-MCNC: 2.2 G/DL (ref 3.2–4.9)
ALBUMIN/GLOB SERPL: 0.7 G/DL
ALP SERPL-CCNC: 295 U/L (ref 30–99)
ALT SERPL-CCNC: 72 U/L (ref 2–50)
ANION GAP SERPL CALC-SCNC: 8 MMOL/L (ref 7–16)
AST SERPL-CCNC: 36 U/L (ref 12–45)
BILIRUB SERPL-MCNC: 0.8 MG/DL (ref 0.1–1.5)
BUN SERPL-MCNC: 19 MG/DL (ref 8–22)
CALCIUM ALBUM COR SERPL-MCNC: 9.2 MG/DL (ref 8.5–10.5)
CALCIUM SERPL-MCNC: 7.8 MG/DL (ref 8.5–10.5)
CHLORIDE SERPL-SCNC: 101 MMOL/L (ref 96–112)
CO2 SERPL-SCNC: 24 MMOL/L (ref 20–33)
CREAT SERPL-MCNC: 0.7 MG/DL (ref 0.5–1.4)
ERYTHROCYTE [DISTWIDTH] IN BLOOD BY AUTOMATED COUNT: 47.5 FL (ref 35.9–50)
GFR SERPLBLD CREATININE-BSD FMLA CKD-EPI: 96 ML/MIN/1.73 M 2
GLOBULIN SER CALC-MCNC: 3 G/DL (ref 1.9–3.5)
GLUCOSE SERPL-MCNC: 92 MG/DL (ref 65–99)
HCT VFR BLD AUTO: 34.3 % (ref 42–52)
HGB BLD-MCNC: 11.5 G/DL (ref 14–18)
MCH RBC QN AUTO: 29.3 PG (ref 27–33)
MCHC RBC AUTO-ENTMCNC: 33.5 G/DL (ref 32.3–36.5)
MCV RBC AUTO: 87.5 FL (ref 81.4–97.8)
PLATELET # BLD AUTO: 171 K/UL (ref 164–446)
PMV BLD AUTO: 10 FL (ref 9–12.9)
POTASSIUM SERPL-SCNC: 3.8 MMOL/L (ref 3.6–5.5)
PROT SERPL-MCNC: 5.2 G/DL (ref 6–8.2)
RBC # BLD AUTO: 3.92 M/UL (ref 4.7–6.1)
SODIUM SERPL-SCNC: 133 MMOL/L (ref 135–145)
WBC # BLD AUTO: 12.2 K/UL (ref 4.8–10.8)

## 2023-08-22 PROCEDURE — 80053 COMPREHEN METABOLIC PANEL: CPT

## 2023-08-22 PROCEDURE — 85027 COMPLETE CBC AUTOMATED: CPT

## 2023-08-22 PROCEDURE — A9270 NON-COVERED ITEM OR SERVICE: HCPCS | Mod: JZ

## 2023-08-22 PROCEDURE — 700105 HCHG RX REV CODE 258

## 2023-08-22 PROCEDURE — 36415 COLL VENOUS BLD VENIPUNCTURE: CPT

## 2023-08-22 PROCEDURE — 700102 HCHG RX REV CODE 250 W/ 637 OVERRIDE(OP): Performed by: STUDENT IN AN ORGANIZED HEALTH CARE EDUCATION/TRAINING PROGRAM

## 2023-08-22 PROCEDURE — 700102 HCHG RX REV CODE 250 W/ 637 OVERRIDE(OP): Mod: JZ

## 2023-08-22 PROCEDURE — 700102 HCHG RX REV CODE 250 W/ 637 OVERRIDE(OP)

## 2023-08-22 PROCEDURE — A9270 NON-COVERED ITEM OR SERVICE: HCPCS

## 2023-08-22 PROCEDURE — A9270 NON-COVERED ITEM OR SERVICE: HCPCS | Performed by: STUDENT IN AN ORGANIZED HEALTH CARE EDUCATION/TRAINING PROGRAM

## 2023-08-22 PROCEDURE — 700111 HCHG RX REV CODE 636 W/ 250 OVERRIDE (IP): Mod: JZ | Performed by: STUDENT IN AN ORGANIZED HEALTH CARE EDUCATION/TRAINING PROGRAM

## 2023-08-22 PROCEDURE — 99232 SBSQ HOSP IP/OBS MODERATE 35: CPT | Mod: GC | Performed by: HOSPITALIST

## 2023-08-22 PROCEDURE — 770020 HCHG ROOM/CARE - TELE (206)

## 2023-08-22 RX ORDER — SODIUM CHLORIDE 9 MG/ML
INJECTION, SOLUTION INTRAVENOUS ONCE
Status: COMPLETED | OUTPATIENT
Start: 2023-08-22 | End: 2023-08-22

## 2023-08-22 RX ORDER — SODIUM CHLORIDE 9 MG/ML
1000 INJECTION, SOLUTION INTRAVENOUS ONCE
Status: COMPLETED | OUTPATIENT
Start: 2023-08-22 | End: 2023-08-22

## 2023-08-22 RX ORDER — BISACODYL 10 MG
10 SUPPOSITORY, RECTAL RECTAL
Status: DISCONTINUED | OUTPATIENT
Start: 2023-08-22 | End: 2023-08-25 | Stop reason: HOSPADM

## 2023-08-22 RX ORDER — POLYETHYLENE GLYCOL 3350 17 G/17G
1 POWDER, FOR SOLUTION ORAL
Status: DISCONTINUED | OUTPATIENT
Start: 2023-08-22 | End: 2023-08-25 | Stop reason: HOSPADM

## 2023-08-22 RX ORDER — AMOXICILLIN 250 MG
2 CAPSULE ORAL 2 TIMES DAILY PRN
Status: DISCONTINUED | OUTPATIENT
Start: 2023-08-22 | End: 2023-08-25 | Stop reason: HOSPADM

## 2023-08-22 RX ADMIN — MIDODRINE HYDROCHLORIDE 2.5 MG: 5 TABLET ORAL at 11:25

## 2023-08-22 RX ADMIN — NYSTATIN 500000 UNITS: 100000 SUSPENSION ORAL at 21:34

## 2023-08-22 RX ADMIN — LANSOPRAZOLE 30 MG: 30 TABLET, ORALLY DISINTEGRATING, DELAYED RELEASE ORAL at 04:40

## 2023-08-22 RX ADMIN — CEFTRIAXONE SODIUM 2000 MG: 10 INJECTION, POWDER, FOR SOLUTION INTRAVENOUS at 02:54

## 2023-08-22 RX ADMIN — SODIUM CHLORIDE 1000 ML: 9 INJECTION, SOLUTION INTRAVENOUS at 14:58

## 2023-08-22 RX ADMIN — ONDANSETRON 4 MG: 4 TABLET, ORALLY DISINTEGRATING ORAL at 22:56

## 2023-08-22 RX ADMIN — ENOXAPARIN SODIUM 40 MG: 100 INJECTION SUBCUTANEOUS at 18:09

## 2023-08-22 RX ADMIN — ATORVASTATIN CALCIUM 80 MG: 80 TABLET, FILM COATED ORAL at 18:09

## 2023-08-22 RX ADMIN — ASPIRIN 81 MG 81 MG: 81 TABLET ORAL at 04:39

## 2023-08-22 RX ADMIN — SODIUM CHLORIDE: 9 INJECTION, SOLUTION INTRAVENOUS at 15:01

## 2023-08-22 RX ADMIN — NYSTATIN 500000 UNITS: 100000 SUSPENSION ORAL at 18:09

## 2023-08-22 RX ADMIN — MIDODRINE HYDROCHLORIDE 2.5 MG: 5 TABLET ORAL at 18:09

## 2023-08-22 RX ADMIN — NYSTATIN 500000 UNITS: 100000 SUSPENSION ORAL at 14:56

## 2023-08-22 RX ADMIN — POTASSIUM CHLORIDE 20 MEQ: 1500 TABLET, EXTENDED RELEASE ORAL at 04:40

## 2023-08-22 ASSESSMENT — COGNITIVE AND FUNCTIONAL STATUS - GENERAL
WALKING IN HOSPITAL ROOM: TOTAL
HELP NEEDED FOR BATHING: TOTAL
TOILETING: TOTAL
DAILY ACTIVITIY SCORE: 6
MOBILITY SCORE: 6
EATING MEALS: TOTAL
STANDING UP FROM CHAIR USING ARMS: TOTAL
SUGGESTED CMS G CODE MODIFIER MOBILITY: CN
DRESSING REGULAR LOWER BODY CLOTHING: TOTAL
DRESSING REGULAR UPPER BODY CLOTHING: TOTAL
MOVING TO AND FROM BED TO CHAIR: UNABLE
MOVING FROM LYING ON BACK TO SITTING ON SIDE OF FLAT BED: UNABLE
CLIMB 3 TO 5 STEPS WITH RAILING: TOTAL
SUGGESTED CMS G CODE MODIFIER DAILY ACTIVITY: CN
PERSONAL GROOMING: TOTAL
TURNING FROM BACK TO SIDE WHILE IN FLAT BAD: UNABLE

## 2023-08-22 ASSESSMENT — ENCOUNTER SYMPTOMS
CHILLS: 0
PALPITATIONS: 0
PHOTOPHOBIA: 0
ORTHOPNEA: 0
FEVER: 0
EYE PAIN: 0
CONSTIPATION: 0
DOUBLE VISION: 0
NAUSEA: 0
DIZZINESS: 0
DIARRHEA: 0
SORE THROAT: 0
COUGH: 0
ABDOMINAL PAIN: 0
FOCAL WEAKNESS: 1
HEMOPTYSIS: 0
WHEEZING: 0
PND: 0
FLANK PAIN: 0
SHORTNESS OF BREATH: 0
VOMITING: 0
HEARTBURN: 0

## 2023-08-22 ASSESSMENT — PAIN DESCRIPTION - PAIN TYPE
TYPE: ACUTE PAIN
TYPE: ACUTE PAIN

## 2023-08-22 ASSESSMENT — FIBROSIS 4 INDEX: FIB4 SCORE: 1.86

## 2023-08-22 NOTE — NON-PROVIDER
" Daily Progress Note:     Date of Service: 8/22/2023  Primary Team: [facility] Team   Attending: KALA Carrillo M.D.   Senior Resident: Dr. Chun  Intern: Dr. 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 Per facility, pt has had 1-week hx of dysuria and urinary frequency. In the ER, blood and urine cultures drawn, ann placed, ceftriaxone 2g started, 1L fluid bolus given, and mididrone resumed.     Review of Systems: Review of Systems   Constitutional:  Positive for malaise/fatigue. Negative for chills and fever.   Respiratory:  Negative for shortness of breath.    Cardiovascular:  Negative for chest pain.   Gastrointestinal:  Negative for constipation, diarrhea, nausea and vomiting.   Genitourinary:  Positive for frequency. Negative for dysuria, flank pain, hematuria and urgency.   Neurological:  Negative for dizziness.       Objective Data:   Physical Exam:   Vitals:   Temp:  [36.4 °C (97.5 °F)-37.2 °C (99 °F)] 36.7 °C (98.1 °F)  Pulse:  [] 115  Resp:  [15-18] 16  BP: ()/(56-85) 129/85  SpO2:  [93 %-96 %] 94 %   Physical Exam  HENT:      Mouth/Throat:      Mouth: Oral lesions present.      Comments: Trush present.  Cardiovascular:      Rate and Rhythm: Regular rhythm. Tachycardia present.   Pulmonary:      Breath sounds: Normal breath sounds.   Abdominal:      General: There is no distension.      Tenderness: There is no abdominal tenderness. There is no right CVA tenderness or left CVA tenderness.   Neurological:      Mental Status: He is alert.      Cranial Nerves: Dysarthria present.      Motor: Weakness present.           Labs:   WBC 12.2 (84% Neut), Na 133, ALT 72 (AST 36), , Alb 2.2, procal 0.9, lactate now 2.0 (from 3.1)    Imaging:   CT abd, CXR, and EKG unrevealing    Problem Representation: "     Sepsis- pt meets sirs criteria for tachycardia and elevated WBC. Give 1L fluid bolus and 200ml Q6h of free water via PEG. Continue ceftriaxone 2g QD and await results of blood cultures.  UTI- likely 2nd to urinary retention possibly a result of post-stroke autonomic dysregulation. Continue use of ann, and await urine cultures.  Thrush- noted on hard palate. give nystatin wash  CVA deficits- continue aspirin, statin, midodrine, and halt clopidogrel. Continue tube feeds of isosource 1.5 with goal of 50ml.

## 2023-08-22 NOTE — PROGRESS NOTES
Telemetry Report:      SR   PAC (O) PVC (R)  0.19/0.08/0.36    Per Telestrip from Monitor Room

## 2023-08-22 NOTE — ASSESSMENT & PLAN NOTE
Admission Na 129, Cl 93 with dry mucus membranes on exam  likely hypovol hypoNa in setting of sepsis    -Fluid repletion per sepsis protocol   -Monitor with daily CMP  -8/22 Na 133  -No clinical signs and symptoms of hyponatremia  -Continue NS

## 2023-08-22 NOTE — ASSESSMENT & PLAN NOTE
Admission Total bilirubin 1.9 with CT abd/ pelvis without signs of GB inflammation and no physical exam findings of any cholecystic etiologies for sepsis at this time.    -Resolved

## 2023-08-22 NOTE — ASSESSMENT & PLAN NOTE
Reported in ED c/o Dysuria, increase urinary frequency x 1 week with UA showing acute infection. Neg suprapubic tenderness and neg CVT b/l  S/p Ellison placement in ED     -Urine culture positive for Klebseilla auerogenese.  -Continue Ceftriaxone 2g for 7 days   -WBC trending down

## 2023-08-22 NOTE — DIETARY
"Nutrition Support Assessment:  Day 1 of admit.  Amanda Greenberg is a 77 y.o. male with admitting DX of sepsis.     Current problem list:  Complicated UTI  Tachycardia  Constipated  Hyponatremia  Transaminitis  Hyperbilirubinemia  Lactic acidosis  Elevated troponin level not due to acute coronary syndrome  Sepsis  Dysphagia  Chronic ischemic left MCA stroke     Assessment:  Estimated Nutritional Needs based on:   Height: 190.5 cm (6' 3\")  Weight: 71 kg (156 lb 8.4 oz)  Weight to Use in Calculations: 71 kg (156 lb 8.4 oz)  Ideal Body Weight: 88.9 kg (196 lb)  Percent Ideal Body Weight: 79.9  Body mass index is 19.56 kg/m²., BMI classification: normal    Calculation/Equation: MSJ x 1.2 = 1838 kcals/day  Total Calories / day: 1838 -  (Calories / k - 28)  Total Grams Protein / day: 85 - 99 (Grams Protein / k.2 - 1.4)  Total Fluids mL/day: 2490 mL (35 mL/kg)     Evaluation:   Admitted from [facility] with hypotension.  Pt with baseline aphasia, right hemiplegia 2' left MCA territory CVA in 2023.  PEG tube in place upon admit (placed 23).  RD visited pt at bedside, pt was sleeping. He appeared adequately nourished.  Discussed prior tube feeding regimen with RN near room. RN unsure of previous tube feeding formula. RD to provide appropriate bolus regimen with tube feeding available on formulary. Communicated with RN.  Wt hx per chart review: 155 lbs 23, 170 lbs 23. No wt loss noted.  Sodium 133, ALT 72, Alk phos 295, A1c 5.1 (7/15)  Aspirin, Prevacid per MAR.  Standard tube feeding formula can meet pt's estimated nutrition needs.      Malnutrition Risk: No new risk identified.      Recommendations/Plan:  Bolus feeds of Isosource 1.5. Goal is 5 containers per day, providing 1875 kcals, 85 grams of protein and 950 mL of free water per day.   Fluids per MD. Pt will need ~ 1500 mL of additional free water per day to meet estimated fluid needs. Recommend 250 mL free water flushes q4 hrs.    RD " following.

## 2023-08-22 NOTE — HOSPITAL COURSE
"Brenton Harvey is a 77 y.o. male with Pmhx of HTN and L MCA territory CVA 7/2023 c/b residual aphasia and R hemiparesis who presented 8/20/2023 via EMS from [facility] for \"sepsis symptoms\".      Communication with pt was done largely with yes or no questions given his aphasia.      Patient notes dysuria and inc urinary frequency over the past week. Denies ever having had these sx or a urinary tract infection in that past. Denies all other ROS symptoms including flank pain, hematuria, urinary dribbling, fevers, chills, and suprapubic pain. Spoke to patient daughter Haley and updated her regarding pt admission per pt request.      Vitals  BP 97/63, , RR 24, Spo2 95 on RA  Labs  WBC 14.3 with ANC 12.33, Hgb 13.4 with MCV 87 (BL Hgb ~15), Na 129, Cl 93, AST 82, , alk phos 455, tbili 1.9, lactic 3.1 --> 2.3. Troponin T 37. UA cloudy with large occult blood and pos nitrites in addition to large leuk esterase and packed WBC + many bacteria. Resp viral panel negative.    Imaging  CT AP w/ moderate stool burden and enlarged prostate. Otherwise notable for atherosclerotic CAD and bibasilar atelectasis.  XRAY WNL   EKG sinus tachycardia     In the ED, blood cultures were drawn and pt had ann placed and received ceftriaxone 2g x 1 dose. He also received 1L LR bolus and a dose of home midodrine (PM dose). S/p interventions pt with good blood pressure recovery and MAP well above 65. Patient admitted to telemetry for sepsis mgmt 2/2 urinary source  Over the course, patient WBC trending down  "

## 2023-08-22 NOTE — ASSESSMENT & PLAN NOTE
this is Sepsis Present on admission  SIRS criteria identified on my evaluation include: Tachycardia, with heart rate greater than 90 BPM, Tachypnea, with respirations greater than 20 per minute and Leukocytosis, with WBC greater than 12,000  Clinical indicators of end organ dysfunction include Lactic Acid greater than 2     -Source is urinary  -Sepsis protocol initiated in ED  -continue IV fluids   -IV antibiotics as appropriate for source of sepsis  -Monitor hemodynamic status

## 2023-08-22 NOTE — CARE PLAN
The patient is Watcher - Medium risk of patient condition declining or worsening    Shift Goals  Clinical Goals: Abx, EPS, Nutrition/Tube feeding  Patient Goals: BRENDAN  Family Goals: BRENDAN    Progress made toward(s) clinical / shift goals:     Problem: Fall Risk  Goal: Patient will remain free from falls    Description  Target End Date: Prior to discharge or change in level of care     Document interventions on the Rebeca Woodward Fall Risk Assessment   1. Assess for fall risk factors   2. Implement fall precautions    Outcome: Progressing  Note: Side rails up. Call button within reach. Bed alarm on. Rounded on patient frequently.    Problem: Pain - Standard  Goal: Alleviation of pain or a reduction in pain to the patient’s comfort goal    Description  Target End Date: Prior to discharge or change in level of care     Document on Vitals flowsheet   1. Document pain using the appropriate pain scale per order or unit policy   2. Educate and implement non-pharmacologic comfort measures (i.e. relaxation, distraction, massage, cold/heat therapy, etc.)   3. Pain management medications as ordered   4. Reassess pain after pain med administration per policy   5. If opiods administered assess patient's response to pain medication is appropriate per POSS sedation scale 6. Follow pain management plan developed in collaboration with patient and interdisciplinary team (including palliative care or pain specialists if applicable)    Outcome: Progressing  Note: Patient unable to respond verbally; FLACC score of 0/10. Continued pain level assessment and monitoring during shift.    Problem: Skin Integrity  Goal: Skin integrity is maintained or improved    Description  Target End Date: Prior to discharge or change in level of care     Document interventions on Skin Risk/Rian flowsheet groups and corresponding LDA     1. Assess and monitor skin integrity, appearance and/or temperature   2. Assess risk factors for impaired skin integrity  and/or pressures ulcers   3. Implement precautions to protect skin integrity in collaboration with interdisciplinary team   4. Implement pressure ulcer prevention protocol if at risk for skin breakdown   5. Confirm wound care consult if at risk for skin breakdown 6. Ensure patient use of pressure relieving devices (Low air loss bed, waffle overlay, heel protectors, ROHO cushion, etc)    Outcome: Progressing  Note: Encouraged turning side to side; on turning schedule q 2. Placed on VIKRAM, Tap system, and moon boots. Heel protectors in place. . Extra pillow used for positional support and heel offloading. Barrier cream applied as needed. Patient cleaned with gown and linens changed as appropriate.    Problem: Hemodynamics  Goal: Patient's hemodynamics, fluid balance and neurologic status will be stable or improve    Description  Target End Date: Prior to discharge or change in level of care     Document on Assessment and I/O flowsheet templates     1. Monitor vital signs, pulse oximetry and cardiac monitor per provider order and/or policy   2. Maintain blood pressure per provider order   3. Hemodynamic monitoring per provider order   4. Manage IV fluids and IV infusions   5. Monitor intake and output   6. Daily weights per unit policy or provider order   7. Assess peripheral pulses and capillary refill   8. Assess color and body temperature       Outcome: Progressing  Note:  Monitored vitals  and l for abnormalities. Monitored LOC and sensorium. Telemetry monitoring in place, monitored for changes.

## 2023-08-22 NOTE — CARE PLAN
The patient is Stable - Low risk of patient condition declining or worsening    Shift Goals  Clinical Goals: Abx, start tube feeds  Patient Goals: BRENDAN  Family Goals: BRENDAN    Progress made toward(s) clinical / shift goals:    Problem: Knowledge Deficit - Standard  Goal: Patient and family/care givers will demonstrate understanding of plan of care, disease process/condition, diagnostic tests and medications  Outcome: Progressing     Problem: Skin Integrity  Goal: Skin integrity is maintained or improved  Outcome: Progressing     Problem: Fall Risk  Goal: Patient will remain free from falls  Outcome: Progressing     Problem: Pain - Standard  Goal: Alleviation of pain or a reduction in pain to the patient’s comfort goal  Outcome: Progressing     Problem: Hemodynamics  Goal: Patient's hemodynamics, fluid balance and neurologic status will be stable or improve  Outcome: Progressing       Patient is not progressing towards the following goals:

## 2023-08-22 NOTE — PROGRESS NOTES
"[facility] Internal Medicine Daily Progress Note    Date of Service  8/22/2023    [facility] Team: [facility] Team   Attending: KALA Carrillo M.d.  Senior Resident: Dr. Esequiel Chun MD  Intern:  Dr. Evette sam MD  Contact Number: [phone]    Chief Complaint  Amanda Greenberg is a 77 y.o. male admitted 8/20/2023 with  dysuria and increase urinary frequency over the past week.    Hospital Course  Brenton Harvey is a 77 y.o. male with Pmhx of HTN and L MCA territory CVA 7/2023 c/b residual aphasia and R hemiparesis who presented 8/20/2023 via EMS from [facility] for \"sepsis symptoms\".      Communication with pt was done largely with yes or no questions given his aphasia.      Patient notes dysuria and inc urinary frequency over the past week. Denies ever having had these sx or a urinary tract infection in that past. Denies all other ROS symptoms including flank pain, hematuria, urinary dribbling, fevers, chills, and suprapubic pain. Spoke to patient daughter Haley and updated her regarding pt admission per pt request.    Imaging  CT AP w/ moderate stool burden and enlarged prostate. Otherwise notable for atherosclerotic CAD and bibasilar atelectasis.  XRAY WNL   EKG sinus tachycardia     In the ED, blood cultures were drawn and pt had ann placed and received ceftriaxone 2g x 1 dose. He also received 1L LR bolus and a dose of home midodrine (PM dose). S/p interventions pt with good blood pressure recovery and MAP well above 65. Patient admitted to telemetry for sepsis mgmt 2/2 urinary source.     Interval Problem Update  No overnight acute event reported by night team. Pt was seen and evaluated at bedside this AM. Denies fever, chill, suprapubic pain.  -Communication with pt was done largely with yes or no questions given his aphasia.   -vitals are stable except tachycardia, pulse 108.  -WBC trending down.  -IV fluids and feeding tube restarted.    I have discussed this patient's plan of care and discharge " plan at IDT rounds today with Case Management, Nursing, Nursing leadership, and other members of the IDT team.    Consultants/Specialty       Code Status  DNAR/DNI    Disposition  The patient is not medically cleared for discharge to home or a post-acute facility.      I have placed the appropriate orders for post-discharge needs.    Review of Systems  Review of Systems   Constitutional:  Positive for malaise/fatigue. Negative for chills and fever.        ROS incomplete given aphasia and most answers were given as YES and NO   HENT:  Negative for nosebleeds and sore throat.    Eyes:  Negative for double vision, photophobia and pain.   Respiratory:  Negative for cough, hemoptysis, shortness of breath and wheezing.    Cardiovascular:  Negative for chest pain, palpitations, orthopnea and PND.   Gastrointestinal:  Negative for abdominal pain, heartburn, nausea and vomiting.   Genitourinary:  Positive for frequency. Negative for dysuria and flank pain.   Neurological:  Positive for focal weakness.        Right extremities weakness post-stroke        Physical Exam  Temp:  [36.6 °C (97.9 °F)-37.5 °C (99.5 °F)] 36.6 °C (97.9 °F)  Pulse:  [] 104  Resp:  [16-23] 16  BP: ()/(56-71) 100/68  SpO2:  [92 %-96 %] 95 %    Physical Exam  HENT:      Mouth/Throat:      Comments: Oral thrush  Eyes:      Extraocular Movements: Extraocular movements intact.      Pupils: Pupils are equal, round, and reactive to light.   Cardiovascular:      Rate and Rhythm: Tachycardia present.      Pulses: Normal pulses.   Pulmonary:      Effort: No respiratory distress.      Breath sounds: No stridor. No wheezing.   Abdominal:      General: Abdomen is flat. There is no distension.      Palpations: Abdomen is soft.      Tenderness: There is no abdominal tenderness.   Musculoskeletal:      Comments: Right extremities weakness post stroke   Neurological:      Mental Status: He is alert.      Cranial Nerves: Cranial nerve deficit present.       Motor: Weakness present.      Comments: Aphasia, dysphagia 2/2 CVA    Psychiatric:         Mood and Affect: Mood normal.         Behavior: Behavior normal.         Fluids    Intake/Output Summary (Last 24 hours) at 8/22/2023 0845  Last data filed at 8/22/2023 0500  Gross per 24 hour   Intake 1210 ml   Output 1450 ml   Net -240 ml       Laboratory  Recent Labs     08/20/23  2255 08/21/23  0311 08/22/23  0303   WBC 14.3* 15.6* 12.2*   RBC 4.46* 4.29* 3.92*   HEMOGLOBIN 13.4* 13.1* 11.5*   HEMATOCRIT 38.8* 36.9* 34.3*   MCV 87.0 86.0 87.5   MCH 30.0 30.5 29.3   MCHC 34.5 35.5 33.5   RDW 45.7 44.5 47.5   PLATELETCT 172 168 171   MPV 9.6 9.8 10.0     Recent Labs     08/20/23 2255 08/21/23  0837 08/22/23  0303   SODIUM 129* 130* 133*   POTASSIUM 4.6 3.5* 3.8   CHLORIDE 93* 95* 101   CO2 27 25 24   GLUCOSE 112* 107* 92   BUN 24* 20 19   CREATININE 1.13 0.93 0.70   CALCIUM 8.6 8.3* 7.8*                   Imaging  CT-ABDOMEN-PELVIS WITH   Final Result         1.  Moderate quantity of stool in the colon, appearance favoring changes of constipation.   2.  Enlarged prostate, consider causes of prostate enlargement with additional workup as clinically appropriate.   3.  Hazy and linear densities the bilateral lung bases, appearance suggests atelectasis although component of infiltrate is not excluded.   4.  Atherosclerosis and atherosclerotic coronary artery disease      DX-CHEST-PORTABLE (1 VIEW)   Final Result         1.  No acute cardiopulmonary disease.           Assessment/Plan  Problem Representation:    * Sepsis (HCC)- (present on admission)  Assessment & Plan  #Leukocytosis    This is Sepsis Present on admission  SIRS criteria identified on my evaluation include: Tachycardia, with heart rate greater than 90 BPM, Tachypnea, with respirations greater than 20 per minute and Leukocytosis, with WBC greater than 12,000  Clinical indicators of end organ dysfunction include Lactic Acid greater than 2  Source is urinary  Sepsis  "protocol initiated  Crystalloid Fluid Administration: Fluid resuscitation ordered per standard protocol - 30 mL/kg per current or ideal body weight  IV antibiotics as appropriate for source of sepsis  Reassessment: I have reassessed the patient's hemodynamic status    CXR negative for acute process. CT AP w/ without concern for acute biliary source of infection given elevated alk phos/ t bili    -See \"Complicated UTI\"  -Complete sepsis protocol fluids with additional 1.3L bolus LR  -Procal, bcx, ucx  -Monitor hemodynamic status  -Cont home midodrine-- possible that some degree of pt's hypotension is from autonomic dysregulation from his CVA    Complicated UTI (urinary tract infection)- (present on admission)  Assessment & Plan  Dysuria + inc urinary frequency x 1 week with UA showing acute infection. Neg suprapubic tenderness and neg CVT b/l  S/p Ellison placement in ED     -Ceftriaxone 2g for minimum 7 days   -Bcx, Ucx    Elevated troponin level not due to acute coronary syndrome- (present on admission)  Assessment & Plan  Trop T 37 on admission and EKG without ischemic findings. Likely type 2 NSTEMI/ demand ischemia in setting of sepsis as above.    -Trend trop and EKG    Lactic acidosis- (present on admission)  Assessment & Plan  IMPROVING  Lactic 3.1 --> 2.3 2/2 sepsis     Hyperbilirubinemia- (present on admission)  Assessment & Plan  Admission T bili 1.9 with CT abd/ pelvis without signs of GB inflammation and no physical exam findings of any cholecystic etiologies for sepsis at this time (I.e. acute ascending cholangitis etc)    -D bili  -Monitor on daily CMP    Transaminitis- (present on admission)  Assessment & Plan  , AST 82-- downtrending from last admission. Patient without abdominal discomfort/ Vang sign  DDx: possible some component of mild sepsis/ shock liver (typically higher values..)    -Monitor with daily CMP  8/21 improving    Hyponatremia- (present on admission)  Assessment & " Plan  Admission Na 129, Cl 93 with dry mucus membranes on exam  DDx: likely hypovol hypoNa in setting of sepsis    -Fluid repletion per sepsis protocol   -Monitor with daily CMP  -8/21 improving    Constipated- (present on admission)  Assessment & Plan  Per CT AP this admission, pt high stool burden. 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 In review of yesterday's note (8/21/2023), there are no changes except as documented above.

## 2023-08-22 NOTE — ASSESSMENT & PLAN NOTE
Reflex tachycardia 2/2 to hypovolemia In the setting of sepsis    -Continue IV fluids  -Pulse 108

## 2023-08-22 NOTE — ASSESSMENT & PLAN NOTE
, AST 82-- downtrending from last admission. Patient without abdominal discomfort/ Vang sign. Possible end organ damage 2/2 sepsis.    -Monitor with daily CMP  -AST 36. ALT 72, towards improvement

## 2023-08-22 NOTE — DISCHARGE PLANNING
Case Management Discharge Planning    Admission Date: 8/20/2023  GMLOS: 4.8  ALOS: 1    6-Clicks ADL Score: 12  6-Clicks Mobility Score: 12      Anticipated Discharge Dispo: Discharge Disposition: D/T to SNF with Medicare cert in anticipation of skilled care (03)  Discharge Address: [facility]    DME Needed: No    Action(s) Taken: OTHER    Escalations Completed: None    Medically Clear: No    Next Steps: LSW to follow up with patient and medical team regarding discharge needs and barriers.     Barriers to Discharge: Medical clearance    **0441  Call from patient's daughter, Teri, wondering if there's an update on patient's APS case. LSW found contact information for Gudelia [phone] who appears to be the  in charge of patient's case. Left voicemail with Gudelia requesting callback.

## 2023-08-22 NOTE — ASSESSMENT & PLAN NOTE
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

## 2023-08-22 NOTE — PROGRESS NOTES
Updated Taj Prado of Sepsis alert triggered by BP 83/56 HR 110s. Also asked for IFC order for patient.

## 2023-08-23 PROBLEM — R22.1 LOCALIZED SWELLING, MASS OR LUMP OF NECK: Status: ACTIVE | Noted: 2023-08-23

## 2023-08-23 LAB
ALBUMIN SERPL BCP-MCNC: 2.3 G/DL (ref 3.2–4.9)
BUN SERPL-MCNC: 21 MG/DL (ref 8–22)
CALCIUM ALBUM COR SERPL-MCNC: 9.2 MG/DL (ref 8.5–10.5)
CALCIUM SERPL-MCNC: 7.8 MG/DL (ref 8.5–10.5)
CHLORIDE SERPL-SCNC: 103 MMOL/L (ref 96–112)
CO2 SERPL-SCNC: 23 MMOL/L (ref 20–33)
CREAT SERPL-MCNC: 0.7 MG/DL (ref 0.5–1.4)
CRP SERPL HS-MCNC: 17.17 MG/DL (ref 0–0.75)
EKG IMPRESSION: NORMAL
EKG IMPRESSION: NORMAL
ERYTHROCYTE [DISTWIDTH] IN BLOOD BY AUTOMATED COUNT: 47.9 FL (ref 35.9–50)
GFR SERPLBLD CREATININE-BSD FMLA CKD-EPI: 96 ML/MIN/1.73 M 2
GLUCOSE SERPL-MCNC: 114 MG/DL (ref 65–99)
HCT VFR BLD AUTO: 34.4 % (ref 42–52)
HGB BLD-MCNC: 11.6 G/DL (ref 14–18)
MCH RBC QN AUTO: 29.9 PG (ref 27–33)
MCHC RBC AUTO-ENTMCNC: 33.7 G/DL (ref 32.3–36.5)
MCV RBC AUTO: 88.7 FL (ref 81.4–97.8)
PHOSPHATE SERPL-MCNC: 2.2 MG/DL (ref 2.5–4.5)
PLATELET # BLD AUTO: 189 K/UL (ref 164–446)
PMV BLD AUTO: 9.8 FL (ref 9–12.9)
POTASSIUM SERPL-SCNC: 3.8 MMOL/L (ref 3.6–5.5)
PREALB SERPL-MCNC: 6.2 MG/DL (ref 18–38)
RBC # BLD AUTO: 3.88 M/UL (ref 4.7–6.1)
SODIUM SERPL-SCNC: 133 MMOL/L (ref 135–145)
TROPONIN T SERPL-MCNC: 39 NG/L (ref 6–19)
WBC # BLD AUTO: 12.3 K/UL (ref 4.8–10.8)

## 2023-08-23 PROCEDURE — A9270 NON-COVERED ITEM OR SERVICE: HCPCS | Performed by: STUDENT IN AN ORGANIZED HEALTH CARE EDUCATION/TRAINING PROGRAM

## 2023-08-23 PROCEDURE — 85027 COMPLETE CBC AUTOMATED: CPT

## 2023-08-23 PROCEDURE — 99232 SBSQ HOSP IP/OBS MODERATE 35: CPT | Mod: GC | Performed by: HOSPITALIST

## 2023-08-23 PROCEDURE — 700111 HCHG RX REV CODE 636 W/ 250 OVERRIDE (IP): Performed by: STUDENT IN AN ORGANIZED HEALTH CARE EDUCATION/TRAINING PROGRAM

## 2023-08-23 PROCEDURE — 84134 ASSAY OF PREALBUMIN: CPT

## 2023-08-23 PROCEDURE — 770020 HCHG ROOM/CARE - TELE (206)

## 2023-08-23 PROCEDURE — A9270 NON-COVERED ITEM OR SERVICE: HCPCS

## 2023-08-23 PROCEDURE — 93010 ELECTROCARDIOGRAM REPORT: CPT | Mod: 76 | Performed by: INTERNAL MEDICINE

## 2023-08-23 PROCEDURE — 97167 OT EVAL HIGH COMPLEX 60 MIN: CPT

## 2023-08-23 PROCEDURE — 700102 HCHG RX REV CODE 250 W/ 637 OVERRIDE(OP): Performed by: HOSPITALIST

## 2023-08-23 PROCEDURE — 700102 HCHG RX REV CODE 250 W/ 637 OVERRIDE(OP)

## 2023-08-23 PROCEDURE — 80069 RENAL FUNCTION PANEL: CPT

## 2023-08-23 PROCEDURE — A9270 NON-COVERED ITEM OR SERVICE: HCPCS | Performed by: HOSPITALIST

## 2023-08-23 PROCEDURE — 93005 ELECTROCARDIOGRAM TRACING: CPT | Performed by: STUDENT IN AN ORGANIZED HEALTH CARE EDUCATION/TRAINING PROGRAM

## 2023-08-23 PROCEDURE — 84484 ASSAY OF TROPONIN QUANT: CPT

## 2023-08-23 PROCEDURE — 86140 C-REACTIVE PROTEIN: CPT

## 2023-08-23 PROCEDURE — 700102 HCHG RX REV CODE 250 W/ 637 OVERRIDE(OP): Performed by: STUDENT IN AN ORGANIZED HEALTH CARE EDUCATION/TRAINING PROGRAM

## 2023-08-23 PROCEDURE — 36415 COLL VENOUS BLD VENIPUNCTURE: CPT

## 2023-08-23 PROCEDURE — 97163 PT EVAL HIGH COMPLEX 45 MIN: CPT

## 2023-08-23 RX ORDER — METOCLOPRAMIDE 10 MG/1
10 TABLET ORAL EVERY 6 HOURS
Status: DISCONTINUED | OUTPATIENT
Start: 2023-08-23 | End: 2023-08-25 | Stop reason: HOSPADM

## 2023-08-23 RX ORDER — SULFAMETHOXAZOLE AND TRIMETHOPRIM 800; 160 MG/1; MG/1
1 TABLET ORAL EVERY 12 HOURS
Status: CANCELLED | OUTPATIENT
Start: 2023-08-23 | End: 2023-09-06

## 2023-08-23 RX ADMIN — ATORVASTATIN CALCIUM 80 MG: 80 TABLET, FILM COATED ORAL at 17:22

## 2023-08-23 RX ADMIN — LANSOPRAZOLE 30 MG: 30 TABLET, ORALLY DISINTEGRATING, DELAYED RELEASE ORAL at 04:57

## 2023-08-23 RX ADMIN — MIDODRINE HYDROCHLORIDE 2.5 MG: 5 TABLET ORAL at 12:47

## 2023-08-23 RX ADMIN — NYSTATIN 500000 UNITS: 100000 SUSPENSION ORAL at 12:46

## 2023-08-23 RX ADMIN — MIDODRINE HYDROCHLORIDE 2.5 MG: 5 TABLET ORAL at 07:52

## 2023-08-23 RX ADMIN — DIBASIC SODIUM PHOSPHATE, MONOBASIC POTASSIUM PHOSPHATE AND MONOBASIC SODIUM PHOSPHATE 500 MG: 852; 155; 130 TABLET ORAL at 07:51

## 2023-08-23 RX ADMIN — METOCLOPRAMIDE 10 MG: 10 TABLET ORAL at 12:47

## 2023-08-23 RX ADMIN — ASPIRIN 81 MG 81 MG: 81 TABLET ORAL at 04:57

## 2023-08-23 RX ADMIN — NYSTATIN 500000 UNITS: 100000 SUSPENSION ORAL at 17:22

## 2023-08-23 RX ADMIN — MIDODRINE HYDROCHLORIDE 2.5 MG: 5 TABLET ORAL at 17:22

## 2023-08-23 RX ADMIN — CEFTRIAXONE SODIUM 2000 MG: 10 INJECTION, POWDER, FOR SOLUTION INTRAVENOUS at 03:45

## 2023-08-23 RX ADMIN — METOCLOPRAMIDE 10 MG: 10 TABLET ORAL at 17:22

## 2023-08-23 RX ADMIN — NYSTATIN 500000 UNITS: 100000 SUSPENSION ORAL at 07:51

## 2023-08-23 RX ADMIN — ENOXAPARIN SODIUM 40 MG: 100 INJECTION SUBCUTANEOUS at 17:23

## 2023-08-23 ASSESSMENT — COGNITIVE AND FUNCTIONAL STATUS - GENERAL
TOILETING: TOTAL
WALKING IN HOSPITAL ROOM: TOTAL
HELP NEEDED FOR BATHING: TOTAL
SUGGESTED CMS G CODE MODIFIER MOBILITY: CN
SUGGESTED CMS G CODE MODIFIER DAILY ACTIVITY: CN
MOVING FROM LYING ON BACK TO SITTING ON SIDE OF FLAT BED: UNABLE
STANDING UP FROM CHAIR USING ARMS: TOTAL
PERSONAL GROOMING: TOTAL
DRESSING REGULAR UPPER BODY CLOTHING: TOTAL
MOVING TO AND FROM BED TO CHAIR: UNABLE
DRESSING REGULAR LOWER BODY CLOTHING: TOTAL
CLIMB 3 TO 5 STEPS WITH RAILING: TOTAL
EATING MEALS: TOTAL
TURNING FROM BACK TO SIDE WHILE IN FLAT BAD: UNABLE
DAILY ACTIVITIY SCORE: 6
MOBILITY SCORE: 6

## 2023-08-23 ASSESSMENT — ENCOUNTER SYMPTOMS
PND: 0
FOCAL WEAKNESS: 1
WHEEZING: 0
SORE THROAT: 0
DOUBLE VISION: 0
FLANK PAIN: 0
VOMITING: 0
PHOTOPHOBIA: 0
CHILLS: 0
EYE PAIN: 0
HEARTBURN: 0
SHORTNESS OF BREATH: 0
PALPITATIONS: 0
ABDOMINAL PAIN: 0
ORTHOPNEA: 0
HEMOPTYSIS: 0
FEVER: 0
COUGH: 0
NAUSEA: 1

## 2023-08-23 ASSESSMENT — ACTIVITIES OF DAILY LIVING (ADL): TOILETING: UNABLE TO DETERMINE AT THIS TIME

## 2023-08-23 ASSESSMENT — PAIN DESCRIPTION - PAIN TYPE: TYPE: ACUTE PAIN

## 2023-08-23 ASSESSMENT — FIBROSIS 4 INDEX: FIB4 SCORE: 1.68

## 2023-08-23 ASSESSMENT — PATIENT HEALTH QUESTIONNAIRE - PHQ9
2. FEELING DOWN, DEPRESSED, IRRITABLE, OR HOPELESS: NOT AT ALL
1. LITTLE INTEREST OR PLEASURE IN DOING THINGS: NOT AT ALL
SUM OF ALL RESPONSES TO PHQ9 QUESTIONS 1 AND 2: 0

## 2023-08-23 ASSESSMENT — GAIT ASSESSMENTS: GAIT LEVEL OF ASSIST: UNABLE TO PARTICIPATE

## 2023-08-23 ASSESSMENT — PAIN SCALES - WONG BAKER: WONGBAKER_NUMERICALRESPONSE: DOESN'T HURT AT ALL

## 2023-08-23 NOTE — PROGRESS NOTES
Updated from Delfina Galeano; PT/OT will note be involved for this admit. This RN re-entered order for PT/OT eval since there was no active order. This RN based it off th 6-clicks score. Updated oncoming RN Litzy of PT/OT not being involved with patient on this admission.

## 2023-08-23 NOTE — CARE PLAN
The patient is Watcher - Medium risk of patient condition declining or worsening    Shift Goals  Clinical Goals: Abx, Tube feeding, PT/OT, Wound care  Patient Goals: BRENDAN  Family Goals: BRENDAN    Progress made toward(s) clinical / shift goals:     Problem: Fall Risk  Goal: Patient will remain free from falls     Description  Target End Date: Prior to discharge or change in level of care      Document interventions on the Rebeca Woodward Fall Risk Assessment   1. Assess for fall risk factors   2. Implement fall precautions     Outcome: Progressing  Note: Side rails up. Call button within reach. Bed alarm on. Rounded on patient frequently.     Problem: Pain - Standard  Goal: Alleviation of pain or a reduction in pain to the patient’s comfort goal     Description  Target End Date: Prior to discharge or change in level of care      Document on Vitals flowsheet   1. Document pain using the appropriate pain scale per order or unit policy   2. Educate and implement non-pharmacologic comfort measures (i.e. relaxation, distraction, massage, cold/heat therapy, etc.)   3. Pain management medications as ordered   4. Reassess pain after pain med administration per policy   5. If opiods administered assess patient's response to pain medication is appropriate per POSS sedation scale 6. Follow pain management plan developed in collaboration with patient and interdisciplinary team (including palliative care or pain specialists if applicable)     Outcome: Progressing  Note: Patient unable to respond verbally; FLACC score of 0/10. Continued pain level assessment and monitoring during shift.     Problem: Skin Integrity  Goal: Skin integrity is maintained or improved     Description  Target End Date: Prior to discharge or change in level of care      Document interventions on Skin Risk/Rian flowsheet groups and corresponding LDA      1. Assess and monitor skin integrity, appearance and/or temperature   2. Assess risk factors for impaired  skin integrity and/or pressures ulcers   3. Implement precautions to protect skin integrity in collaboration with interdisciplinary team   4. Implement pressure ulcer prevention protocol if at risk for skin breakdown   5. Confirm wound care consult if at risk for skin breakdown 6. Ensure patient use of pressure relieving devices (Low air loss bed, waffle overlay, heel protectors, ROHO cushion, etc)     Outcome: Progressing  Note: Encouraged turning side to side; on turning schedule q 2. Waffle mattress in place. Requested VIKRAM - pending availability. Tap system, and moon boots. Heel protectors in place. . Extra pillow used for positional support and heel offloading. Barrier cream applied as needed. Patient cleaned with gown and linens changed as appropriate.     Problem: Hemodynamics  Goal: Patient's hemodynamics, fluid balance and neurologic status will be stable or improve     Description  Target End Date: Prior to discharge or change in level of care      Document on Assessment and I/O flowsheet templates      1. Monitor vital signs, pulse oximetry and cardiac monitor per provider order and/or policy   2. Maintain blood pressure per provider order   3. Hemodynamic monitoring per provider order   4. Manage IV fluids and IV infusions   5. Monitor intake and output   6. Daily weights per unit policy or provider order   7. Assess peripheral pulses and capillary refill   8. Assess color and body temperature       Outcome: Progressing  Note:  Monitored vitals  and l for abnormalities. Monitored LOC and sensorium. Telemetry monitoring in place, monitored for changes.    Problem: Risk for Aspiration  Goal: Patient's risk for aspiration will be absent or decrease     Description  Target End Date: Prior to discharge or change in level of care      1. Complete dysphagia screening on admission   2. NPO until dysphagia screening complete or medically cleared   3. Collaborate with Speech Therapy, Clinical Dietitian and  interdisciplinary team   4. Implement aspiration precautions   5. Assist patient up to chair for meals   6. Elevate head of bed 90 degrees if patient is unable to get out of bed   7. Encourage small bites   8. Ensure foods/liquids are of appropriate consistency   9. Assess for any signs/symptoms of aspiration 10. Assess breath sounds and vital signs after oral intake    Outcome: Progressing  Note:  Monitored and assessed for LOC. Patient awake and follow instructions. Elevated HOB> 30. On NPO with meds..    Problem: Nutrition  Goal: Enteral nutrition will be maintained or improve    Description  Target End Date: Prior to discharge or change in level of care   1. Enteral access to be obtained or modified   2. Advance to goal rate per protocol   3. Collaborate with Clinical Dietitian for signs/symptoms of intolerance   4. Weights per provider order     Outcome: Progressing  Note:  PEG tube in place,; checked for placement via auscultation and aspiration (+) gastric aspirate. Tube feeding initiated with free water pre and post feeding and q4. Checked for tolerance and residuals.

## 2023-08-23 NOTE — ASSESSMENT & PLAN NOTE
Localized soft swelling on left anterior neck, history undetermined.  Does not complain of pain tenderness trouble breathing.    -Consider CT neck if sympomatic

## 2023-08-23 NOTE — PROGRESS NOTES
EKG results in; updated Mallika from [facility]. Photos of EKG sent for her evaluation.    Per Mallika; EKG inconclusive, suggests Trop level. Updated Dr. Taylor; trop level ordered.

## 2023-08-23 NOTE — PROGRESS NOTES
Central monitoring called; reports that patient is having some ST elevation. EKG ordered per protocol.

## 2023-08-23 NOTE — THERAPY
Physical Therapy   Initial Evaluation     Patient Name: Amanda Thirtyfive  Age:  77 y.o., Sex:  male  Medical Record #: [id number]  Today's Date: 8/23/2023     Precautions: Fall Risk; PEG Tube  Comments: CVA with R sided deficits, strong posterior/R push    Assessment  Patient is 77 y.o. male presenting acutely from SNF with dysuria and increased urinary frequency. 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 Family reports pt working on transfers at SNF prior requiring quite a bit of assistance, dtr also reports working on longterm care in [place] for once pt DC'd from SNF. Today pt required total A for bed mob. Sitting balance impaired by strong posterior and R lateral push. Attempted to correct to midline however pt unable to sustain for any amount of time. Worked on L lateral lean as well with mild improvement. PT to trial 4x/wk to determine benefit from therapy.     Plan    Physical Therapy Initial Treatment Plan   Treatment Plan : Bed Mobility, Equipment, Neuro Re-Education / Balance, Self Care / Home Evaluation, Therapeutic Activities  Treatment Frequency: 4 Times per Week  Duration: Until Therapy Goals Met     Discharge Recommendations: Recommend post-acute placement for additional physical therapy services prior to discharge home (return to SNF to resume therapies)     Objective    Prior Living Situation   Prior Services Continuous (24 Hour) Care Giving Per Service   Housing / Facility Skilled Nursing Facility   Lives with - Patient's Self Care Capacity Attendant / Paid Care Giver   Comments Unable to obtain previous living situation, pt transferred from SNF which he has been for past 5 wks receiving therapy. Per family, they believe he was working on transferring to chair. Family working on obtaining more longterm support in [place] once he is ready to DC from SNF.   Prior Level of Functional Mobility   Bed Mobility Required Assist   Transfer Status Required Assist   Cognition     Cognition / Consciousness X   Speech/ Communication Expressive Aphasia   Level of Consciousness Responds to voice   Ability To Follow Commands Unable to Follow 1 Step Commands   New Learning Impaired   Sequencing Impaired   Active ROM Upper Body   Active ROM Upper Body  X   Comments RUE grossly impaired by weakness - no volitional mvt observed   Strength Upper Body   Upper Body Strength  X   Comments as above   Sensation Upper Body   Comments denies sensory deficits on R side   Active ROM Lower Body    Active ROM Lower Body  X   Comments RLE grossly impacted by weakness   Strength Lower Body   Lower Body Strength  X   Comments as above, no volitional mvt in RLE noted   Sensation Lower Body   Comments denies sensory deficit RLE   Neurological Concerns   Neurological Concerns Yes   Standing Posture During ADL's Posterior Lean;Pushes to the Right   Comments hx of CVA with R residual deficits an aphasia   Balance Assessment   Sitting Balance (Static) Dependent   Sitting Balance (Dynamic) Dependent   Weight Shift Sitting Absent   Comments impacted by strong posterior and R lateral push, unable to sustain midline for any amount of time after corrected   Bed Mobility    Supine to Sit Total Assist   Sit to Supine Total Assist   Scooting Total Assist   Gait Analysis   Gait Level Of Assist Unable to Participate   Functional Mobility   Sit to Stand Unable to Participate   Bed, Chair, Wheelchair Transfer Unable to Participate   How much difficulty does the patient currently have...   Turning over in bed (including adjusting bedclothes, sheets and blankets)? 1   Sitting down on and standing up from a chair with arms (e.g., wheelchair, bedside commode, etc.) 1   Moving from lying on back to sitting on the side of the bed? 1   How much help from another person does the patient currently need...   Moving to and from a bed to a chair (including a wheelchair)? 1   Need to walk in a hospital room? 1   Climbing 3-5 steps with a  railing? 1   6 clicks Mobility Score 6   Activity Tolerance   Sitting in Chair NT   Sitting Edge of Bed 8 mins   Standing NT   Short Term Goals    Short Term Goal # 1 Pt will perform supine<>sit with mod A within 6 visits.   Short Term Goal # 2 Pt will maintain static sitting balance >2 mins with fair- control within 6 visits to improve postural control

## 2023-08-23 NOTE — DISCHARGE PLANNING
Case Management Discharge Planning    Admission Date: 8/20/2023  GMLOS: 4.8  ALOS: 2    6-Clicks ADL Score: 6  6-Clicks Mobility Score: 6  PT and/or OT Eval ordered: Yes  Post-acute Referrals Ordered: Yes  Post-acute Choice Obtained: Yes  Has referral(s) been sent to post-acute provider:  Yes      Anticipated Discharge Dispo: Discharge Disposition: D/T to SNF with Medicare cert in anticipation of skilled care (03)  [facility]    DME Needed: No    Action(s) Taken: Updated Provider/Nurse on Discharge Plan    Escalations Completed: None    Medically Clear: No    Next Steps: LSW to follow up with patient and medical team regarding discharge needs and barriers.     Barriers to Discharge: Medical clearance    **0730  LSW received email overnight from patient's daughter, Teri, expressing Karoline Muir will be visiting patient today around 1030 and expressing concerns about this visit and not wanting her dad to be interviewed as she, Teri, cannot be in the room.     **0830  LSW reached out to supervisors on next steps.     **0960  Call made to Karoline Malin to inquire what said visit is regarding. Per Karoline, she was assigned patient's case regarding the conservatorship that has been filed by patient's daughter, Teri. Karoline reports this is a routine visit to discuss with patient whether he thinks Teri is appropriate to be his conservator and to inform him of his rights to appeal, hire an , and a right to trial. Because the temporary conservatorship is held by Teri, Teri cannot be in the room in order to ensure patient's responses are voluntary. Moreover, Karoline reports patient also has the right to dictate who can and cannot visit him. Karoline reports Teri has been stating Vanessa should not visit patient but, ultimately, patient has a right to decide on his visitors. Therefore, this interview will also discuss who patient wishes to see. Karoline reports she will  be at bedside around 1030 and that I am free to be present if I wish. Unfortunately, this writer has IDT rounds at 1045 and being present at this interview is not possible. Karoline Malin was provided with direct number for this LSW. Karoline asked this LSW whether I think Teri is acting in patient's best interests and if she seems to be appropriate for conservatorsip. LSW let Karoline know I have not interacted with Teri enough to make this call.     Karoline Malin [phone]    **1112  LSW asked to step out of IDT rounds briefly. LSW met with Karoline outside of SMT conference room. Per Karoline, patient's daughter gave her patient's room as S141 and Karoline was told patient is not in that room. LSW provided Karoline with patient's current room location: S143 not S141. Karoline inquired what name patient is under as they could not find him in the system. LSW informed Karoline patient's daughter requested patient be placed under an alias: Yari Patel. Karoline to meet with patient.     **1130  Patient discussed during IDT rounds. He is not medically cleared at this time. Anticipated clearance in a couple days.

## 2023-08-23 NOTE — PROGRESS NOTES
Central monitoring called; stated that patient is Tomb stoning. Updated Mallika from [facility]. Patient evaluated by Mallika at bedside, non-conclusive ST elevation in monitor. Repeat EKG ordered for confirmation. Pending trop level.

## 2023-08-23 NOTE — PROGRESS NOTES
Telemetry Report:      SR/ST   PVC (R)  0.14/0.06/0.34    Per [employer] from Monitor Room

## 2023-08-23 NOTE — DIETARY
Nutrition Services: Brief Update    Consult received for tube feeding.  Per discussion with RN via Vo[employer]te pt is not tolerating bolus feeds.   RD will transition to continuous feeds to promote tolerance.  Pt to resume home regimen upon discharge.   Standard tube feeding formula can meet pt's estimated nutrition needs.     Calculation/Equation: MSJ x 1.2 = 1838 kcals/day  Total Calories / day: 1838 -  (Calories / k - 28)  Total Grams Protein / day: 85 - 99 (Grams Protein / k.2 - 1.4)  Total Fluids mL/day: 2490 mL (35 mL/kg)    Recommendations/Plan:  Change TF to Fibersource HN. Advance per protocol to goal rate of 65 mL/hr. This provides 1872 kcals, 84 grams of protein and 1264 mL of free water per day.  Fluids per MD.    RD will follow.

## 2023-08-23 NOTE — PROGRESS NOTES
Updated Dr. Nugent and Mallika Chang regarding Deterioration index; from 49 to 60. No change is patient's status.

## 2023-08-23 NOTE — THERAPY
Occupational Therapy   Initial Evaluation     Patient Name: Amanda Thirtyfive  Age:  77 y.o., Sex:  male  Medical Record #: [id number]  Today's Date: 8/23/2023    Precautions: (P) Fall Risk, PEG Tube  Comments: CVA with R sided deficits, strong posterior/R push    Assessment  Patient is 77 y.o. male admitted from [facility] with dysuria and urinary frequency with PMHx of L MCA CVA on 7/2023 with R hemiparesis seen for OT evaluation. Pt presented with strong posterior push at EOB, R UE flaccidity, impaired command following (able to follow 1 step with 75% accuracy), impaired RUE sensation, able to answer yes/no questions. Unable to gain accurate PLOF as pt has difficulty communicating. Will follow for skilled OT.     Plan    Occupational Therapy Initial Treatment Plan   Treatment Interventions: (P) Self Care / Activities of Daily Living, Adaptive Equipment, Cognitive Skill Development, Neuro Re-Education / Balance, Therapeutic Exercises, Therapeutic Activity, Sensory Integration Techniques  Treatment Frequency: (P) 3 Times per Week  Duration: (P) Until Therapy Goals Met    DC Equipment Recommendations: (P) Unable to determine at this time  Discharge Recommendations: (P) Recommend post-acute placement for additional occupational therapy services prior to discharge home (anticipate need for LTAC placement)      08/23/23 1345   Prior Living Situation   Prior Services Continuous (24 Hour) Care Giving Per Service   Housing / Facility Skilled Nursing Facility   Comments pt poor historian, per chart has been residing at [facility] last 5 weeks   Prior Level of ADL Function   Self Feeding Unable To Determine At This Time   Grooming / Hygiene Unable To Determine At This Time   Bathing Unable To Determine At This Time   Dressing Unable To Determine At This Time   Toileting Unable To Determine At This Time   Prior Level of IADL Function   Medication Management Unable To Determine At This Time   Laundry Unable To Determine At This  "Time   Kitchen Mobility Unable To Determine At This Time   Finances Unable To Determine At This Time   Home Management Unable To Determine At This Time   Precautions   Precautions Fall Risk;PEG Tube   Pain 0 - 10 Group   Therapist Pain Assessment   (pain in RUE during AROM)   Cognition    Cognition / Consciousness X   Speech/ Communication Expressive Aphasia;Receptive Aphasia   Ability To Follow Commands   (poor command following)   New Learning Impaired   Sequencing Impaired   Comments Pt with difficulty following commands, able to response yes/no with fair accuracy   Active ROM Upper Body   Active ROM Upper Body  X   Comments no initation in AROM observed in RUE   Strength Upper Body   Upper Body Strength  X   Comments 0/5 strength RUE   Sensation Upper Body   Upper Extremity Sensation  X   Comments able to state \"yes\" to decreased sensation in RUE   Balance Assessment   Sitting Balance (Static) Dependent   Sitting Balance (Dynamic) Dependent   Weight Shift Sitting Absent   Bed Mobility    Supine to Sit Total Assist   Sit to Supine Total Assist   Scooting Total Assist   ADL Assessment   Grooming Total Assist   Upper Body Dressing Total Assist   Lower Body Dressing Total Assist   Toileting Total Assist  (cleaning incontinent BM)   How much help from another person does the patient currently need...   6 Clicks Daily Activity Score 6   mRS Prior to admission   Prior to admission mRS 5   Modified Chastity (mRS)   Modified [place]os Score 5   Functional Mobility   Sit to Stand Unable to Participate   Bed, Chair, Wheelchair Transfer Unable to Participate   Toilet Transfers Unable to Participate   Patient / Family Goals   Patient / Family Goal #1 none stated   Short Term Goals   Short Term Goal # 1 Pt will demo fair- seated balance in prep for seated ADLs   Short Term Goal # 2 Pt will properly position RUE using LUE during mobility tasks   Education Group   Role of Occupational Therapist Patient Response " Patient;Explanation;No Learning Evidence   Occupational Therapy Initial Treatment Plan    Treatment Interventions Self Care / Activities of Daily Living;Adaptive Equipment;Cognitive Skill Development;Neuro Re-Education / Balance;Therapeutic Exercises;Therapeutic Activity;Sensory Integration Techniques   Treatment Frequency 3 Times per Week   Duration Until Therapy Goals Met   Problem List   Problem List Decreased Active Daily Living Skills;Decreased Homemaking Skills;Decreased Upper Extremity AROM Right;Decreased Functional Mobility;Decreased Activity Tolerance;Safety Awareness Deficits / Cognition;Impaired Sensation Right Upper Extremity;Impaired Coordination Right Upper Extremity;Decreased Upper Extremity Strength Right;Impaired Postural Control / Balance;Impaired Cognitive Function   Anticipated Discharge Equipment and Recommendations   DC Equipment Recommendations Unable to determine at this time   Discharge Recommendations Recommend post-acute placement for additional occupational therapy services prior to discharge home  (anticipate need for LTAC placement)

## 2023-08-23 NOTE — PROGRESS NOTES
"[facility] Internal Medicine Daily Progress Note    Date of Service  8/23/2023    R Team: R ARNALDO Flores Team   Attending: KALA aCrrillo M.d.  Senior Resident: Dr. Esequiel Chun MD  Intern:  Dr. Evette sam MD  Contact Number: [phone]    Chief Complaint  Amanda Greenberg is a 77 y.o. male admitted 8/20/2023 with  dysuria and increase urinary frequency over the past week.    Hospital Course  Brenton Harvey is a 77 y.o. male with Pmhx of HTN and L MCA territory CVA 7/2023 c/b residual aphasia and R hemiparesis who presented 8/20/2023 via EMS from [facility] for \"sepsis symptoms\".      Communication with pt was done largely with yes or no questions given his aphasia.      Patient notes dysuria and inc urinary frequency over the past week. Denies ever having had these sx or a urinary tract infection in that past. Denies all other ROS symptoms including flank pain, hematuria, urinary dribbling, fevers, chills, and suprapubic pain. Spoke to patient daughter Haley and updated her regarding pt admission per pt request.    Imaging  CT AP w/ moderate stool burden and enlarged prostate. Otherwise notable for atherosclerotic CAD and bibasilar atelectasis.  XRAY WNL   EKG sinus tachycardia     In the ED, blood cultures were drawn and pt had ann placed and received ceftriaxone 2g x 1 dose. He also received 1L LR bolus and a dose of home midodrine (PM dose). S/p interventions pt with good blood pressure recovery and MAP well above 65. Patient admitted to telemetry for sepsis mgmt 2/2 urinary source.     8/23/2023 patient had 1 episode of the chest pain repeat EKG and troponin were insignificant for STEMI/NSTEMI.    Interval Problem Update  -Overnight complain of chest pain with f/u EKG and Trending Troponin. EKG unremarkable for STEMI/NSTEMI.  Troponin stable.  -Pt was seen and evaluated at bedside this AM. Denies fever, chill, suprapubic pain, does complain of nausea after the feed.  -Patient was receiving feeding " tube boluses, reason he was throwing up, dietitian orders were in place, nurse reached out to dietitian to adjust the tube feed and rate.  -Communication with pt was done largely with yes or no questions given his aphasia.   -vitals are stable except tachycardic pulse 100.  -WBC on higher end 12.3, CRP 17.7  -Hypophosphatemia on repeat CMP-repleted  -IV fluids and feeding tube conitnued.    I have discussed this patient's plan of care and discharge plan at IDT rounds today with Case Management, Nursing, Nursing leadership, and other members of the IDT team.    Consultants/Specialty       Code Status  DNAR/DNI    Disposition  The patient is not medically cleared for discharge to home or a post-acute facility.      I have placed the appropriate orders for post-discharge needs.    Review of Systems  Review of Systems   Constitutional:  Negative for chills, fever and malaise/fatigue.        ROS incomplete given aphasia and most answers were given as YES and NO   HENT:  Negative for nosebleeds and sore throat.    Eyes:  Negative for double vision, photophobia and pain.   Respiratory:  Negative for cough, hemoptysis, shortness of breath and wheezing.    Cardiovascular:  Positive for chest pain. Negative for palpitations, orthopnea and PND.   Gastrointestinal:  Positive for nausea. Negative for abdominal pain, heartburn and vomiting.        Vomiting after tube feed boluses   Genitourinary:  Negative for dysuria, flank pain and frequency.   Neurological:  Positive for focal weakness.        Right extremities weakness post-stroke        Physical Exam  Temp:  [36.3 °C (97.4 °F)-36.8 °C (98.3 °F)] 36.7 °C (98.1 °F)  Pulse:  [] 95  Resp:  [15-19] 18  BP: ()/(65-85) 106/74  SpO2:  [93 %-99 %] 96 %    Physical Exam  Constitutional:       Appearance: He is ill-appearing.   HENT:      Mouth/Throat:      Comments: Oral thrush-improving  Eyes:      Extraocular Movements: Extraocular movements intact.      Pupils: Pupils  are equal, round, and reactive to light.   Cardiovascular:      Rate and Rhythm: Normal rate.      Pulses: Normal pulses.   Pulmonary:      Effort: No respiratory distress.      Breath sounds: No stridor. No wheezing.   Abdominal:      General: Abdomen is flat. There is no distension.      Palpations: Abdomen is soft.      Tenderness: There is no abdominal tenderness.   Musculoskeletal:      Comments: Right extremities weakness post stroke   Neurological:      Mental Status: He is alert.      Cranial Nerves: Cranial nerve deficit present.      Motor: Weakness present.      Comments: Aphasia, dysphagia 2/2 CVA    Psychiatric:         Mood and Affect: Mood normal.         Behavior: Behavior normal.         Fluids    Intake/Output Summary (Last 24 hours) at 8/23/2023 0829  Last data filed at 8/23/2023 0800  Gross per 24 hour   Intake 2093.92 ml   Output 2550 ml   Net -456.08 ml         Laboratory  Recent Labs     08/21/23  0311 08/22/23  0303 08/23/23  0119   WBC 15.6* 12.2* 12.3*   RBC 4.29* 3.92* 3.88*   HEMOGLOBIN 13.1* 11.5* 11.6*   HEMATOCRIT 36.9* 34.3* 34.4*   MCV 86.0 87.5 88.7   MCH 30.5 29.3 29.9   MCHC 35.5 33.5 33.7   RDW 44.5 47.5 47.9   PLATELETCT 168 171 189   MPV 9.8 10.0 9.8       Recent Labs     08/21/23  0837 08/22/23  0303 08/23/23  0119   SODIUM 130* 133* 133*   POTASSIUM 3.5* 3.8 3.8   CHLORIDE 95* 101 103   CO2 25 24 23   GLUCOSE 107* 92 114*   BUN 20 19 21   CREATININE 0.93 0.70 0.70   CALCIUM 8.3* 7.8* 7.8*                     Imaging  CT-ABDOMEN-PELVIS WITH   Final Result         1.  Moderate quantity of stool in the colon, appearance favoring changes of constipation.   2.  Enlarged prostate, consider causes of prostate enlargement with additional workup as clinically appropriate.   3.  Hazy and linear densities the bilateral lung bases, appearance suggests atelectasis although component of infiltrate is not excluded.   4.  Atherosclerosis and atherosclerotic coronary artery disease       DX-CHEST-PORTABLE (1 VIEW)   Final Result         1.  No acute cardiopulmonary disease.             Assessment/Plan  Problem Representation:    * Sepsis (HCC)- (present on admission)  Assessment & Plan  This is Sepsis Present on admission  SIRS criteria identified on my evaluation include: Tachycardia, with heart rate greater than 90 BPM, Tachypnea, with respirations greater than 20 per minute and Leukocytosis, with WBC greater than 12,000  Clinical indicators of end organ dysfunction include Lactic Acid greater than 2  Source is urinary  Sepsis protocol initiated    -Complete sepsis protocol fluids with additional 1.3L bolus LR  -Monitor hemodynamic status  -Cont home midodrine-- possible that some degree of pt's hypotension is from autonomic dysregulation from his CV  -Urine culture grew Klebseilla aerogeneses on 8/22/23  -Continue current antibiotics    Complicated UTI (urinary tract infection)- (present on admission)  Assessment & Plan  Dysuria + inc urinary frequency x 1 week with UA showing acute infection. Neg suprapubic tenderness, does have CVA tenderness today 8/23/23  S/p Ellison placement in ED     -WBC is still raised, CRP 17.7  -Ceftriaxone 2g for minimum 7 days   -Blood culture grew no growth, Urine culture grew Klebseilla aerogeneses on 8/22/23  -Continue current Abx  -    Localized swelling, mass or lump of neck  Assessment & Plan  Localized soft swelling on left anterior neck, history undetermined.  Does not complain of pain tenderness trouble breathing.    -Consider CT neck    Elevated troponin level not due to acute coronary syndrome- (present on admission)  Assessment & Plan  Trop T 37 on admission and EKG without ischemic findings. Likely type 2 NSTEMI/ demand ischemia in setting of sepsis as above.    -Overnight chest pain with repeat EKG and trop not remarkable for ischemic findings  -Continue telemetry    Hyponatremia- (present on admission)  Assessment & Plan  Admission Na 129, Cl 93 with  dry mucus membranes on exam, likely hypovol hypoNa in setting of sepsis    -Fluid repletion per sepsis protocol   -Monitor with daily CMP  -8/23 Na 133    Constipated- (present on admission)  Assessment & Plan  Per CT AP this admission, pt high stool burden. On questioning, denies constipation     -PRN bowel reg- consider scheduling if pt unable to pass BM    Dysphagia  Assessment & Plan  Post CVA dysphagia  Continue feeding tube.    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 acute ascending cholangitis etc)    -Resolved    Transaminitis- (present on admission)  Assessment & Plan  , AST 82-- downtrending from last admission. Patient without abdominal discomfort/ Vang sign  DDx: possible some component of mild sepsis/ shock liver (typically higher values..)    -Monitor with daily CMP  -towards improvement on daily CMP    Tachycardia- (present on admission)  Assessment & Plan  HR 120s on admission-- sinus on EKG, likely reflex tachycardia 2/2 distributive shock.    -Monitor on telemetry         VTE prophylaxis: enoxaparin ppx    I have performed a physical exam and reviewed and updated ROS and Plan today (8/23/2023). In review of yesterday's note (8/22/2023), there are no changes except as  documented above.

## 2023-08-23 NOTE — ASSESSMENT & PLAN NOTE
Trop T 37 on admission and EKG without ischemic findings. Likely type 2 NSTEMI/ demand ischemia in setting of sepsis as above.

## 2023-08-23 NOTE — NON-PROVIDER
Daily Progress Note:     Date of Service: 8/23/2023  Primary Team: [facility] Team   Attending: KALA Carrillo M.D.   Senior Resident: Dr. Chun  Intern: Dr. Frey  Contact:  [phone]    Chief Complaint:   Syncope and black stools    Subjective: Pt is 76yo M here HOD2 for 1-week hx of worsening dysuria and urinary frequency, with PMH of L MCA CVA c/b residual aphasia and R hemiparesis, and HTN.    He had a bout of brief CP though he is not in pain today. This leads him to feel worse today. He had spit up a small amount of his feeds today, which is being addressed with Reglan. Otherwise, he currently denies dysuria, frequency, urgency, hesitancy, flank pain, or n/v/c/d.    Objective Data:   Physical Exam:   Vitals:   Temp:  [36.3 °C (97.4 °F)-36.8 °C (98.3 °F)] 36.7 °C (98.1 °F)  Pulse:  [] 95  Resp:  [15-19] 18  BP: ()/(65-85) 106/74  SpO2:  [93 %-99 %] 96 %   Physical Exam  HENT:      Mouth/Throat:      Mouth: Mucous membranes are moist. No oral lesions.   Cardiovascular:      Rate and Rhythm: Normal rate and regular rhythm.   Pulmonary:      Breath sounds: Normal breath sounds.   Abdominal:      Tenderness: There is no right CVA tenderness or left CVA tenderness.   Skin:            Comments: L neck fluctuant nontender mass.   Neurological:      Mental Status: He is alert.      Cranial Nerves: Dysarthria present.      Motor: Weakness present.           Labs:   Urine came back positive for klebsiella aerogenes, with preferred antibiotic noted as Bactrim 1-2 doses Q12hrs. Most recent P of 2.2.    Imaging:   EKG was unrevealing.    Problem Representation:     Sepsis- currently meets WBC and Pulse criteria for sepsis. Continue fluids and ceftriaxone 2g, could consider switch to TMP-/160 BID 14 days. Daily procal, continue midodrine, and await blood cultures.  UTI- dysuria, revealing UA, urine cultures positive for K. Aerogenes. Same plan as above.  Chest pain- likely 2nd to feeding  intolerance/gastroparesis with unremarkalbe Trop/EKG. Initiate Reglan to address this.  CVA deficits- continue aspirin, statin, midodrine, and hold plavix. PT/OT consult when stable.  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 Attempted to reach emergency contact with no response, may re-attempt tomorrow. Date/onset of emergence currently unknown. Consider CT of neck in absence of inability to ascertain this information.  Hypophosphatemia- P of 2.2 likely due to refeeding. Repleting with K, Mg, and P.

## 2023-08-24 LAB
ALBUMIN SERPL BCP-MCNC: 2.4 G/DL (ref 3.2–4.9)
ALBUMIN/GLOB SERPL: 0.8 G/DL
ALP SERPL-CCNC: 278 U/L (ref 30–99)
ALT SERPL-CCNC: 52 U/L (ref 2–50)
ANION GAP SERPL CALC-SCNC: 9 MMOL/L (ref 7–16)
AST SERPL-CCNC: 39 U/L (ref 12–45)
BACTERIA UR CULT: ABNORMAL
BACTERIA UR CULT: ABNORMAL
BILIRUB SERPL-MCNC: 0.4 MG/DL (ref 0.1–1.5)
BUN SERPL-MCNC: 16 MG/DL (ref 8–22)
CALCIUM ALBUM COR SERPL-MCNC: 9.1 MG/DL (ref 8.5–10.5)
CALCIUM SERPL-MCNC: 7.8 MG/DL (ref 8.5–10.5)
CHLORIDE SERPL-SCNC: 99 MMOL/L (ref 96–112)
CO2 SERPL-SCNC: 24 MMOL/L (ref 20–33)
CREAT SERPL-MCNC: 0.6 MG/DL (ref 0.5–1.4)
GFR SERPLBLD CREATININE-BSD FMLA CKD-EPI: 100 ML/MIN/1.73 M 2
GLOBULIN SER CALC-MCNC: 3.2 G/DL (ref 1.9–3.5)
GLUCOSE SERPL-MCNC: 124 MG/DL (ref 65–99)
POTASSIUM SERPL-SCNC: 3.3 MMOL/L (ref 3.6–5.5)
PROT SERPL-MCNC: 5.6 G/DL (ref 6–8.2)
SIGNIFICANT IND 70042: ABNORMAL
SITE SITE: ABNORMAL
SODIUM SERPL-SCNC: 132 MMOL/L (ref 135–145)
SOURCE SOURCE: ABNORMAL

## 2023-08-24 PROCEDURE — 700101 HCHG RX REV CODE 250: Performed by: STUDENT IN AN ORGANIZED HEALTH CARE EDUCATION/TRAINING PROGRAM

## 2023-08-24 PROCEDURE — A9270 NON-COVERED ITEM OR SERVICE: HCPCS | Performed by: HOSPITALIST

## 2023-08-24 PROCEDURE — 770020 HCHG ROOM/CARE - TELE (206)

## 2023-08-24 PROCEDURE — 700111 HCHG RX REV CODE 636 W/ 250 OVERRIDE (IP): Performed by: STUDENT IN AN ORGANIZED HEALTH CARE EDUCATION/TRAINING PROGRAM

## 2023-08-24 PROCEDURE — 80053 COMPREHEN METABOLIC PANEL: CPT

## 2023-08-24 PROCEDURE — 700102 HCHG RX REV CODE 250 W/ 637 OVERRIDE(OP): Mod: JZ

## 2023-08-24 PROCEDURE — 99232 SBSQ HOSP IP/OBS MODERATE 35: CPT | Mod: GC | Performed by: HOSPITALIST

## 2023-08-24 PROCEDURE — 36415 COLL VENOUS BLD VENIPUNCTURE: CPT

## 2023-08-24 PROCEDURE — 51798 US URINE CAPACITY MEASURE: CPT

## 2023-08-24 PROCEDURE — 700111 HCHG RX REV CODE 636 W/ 250 OVERRIDE (IP): Mod: JZ

## 2023-08-24 PROCEDURE — 700102 HCHG RX REV CODE 250 W/ 637 OVERRIDE(OP): Performed by: HOSPITALIST

## 2023-08-24 PROCEDURE — 700102 HCHG RX REV CODE 250 W/ 637 OVERRIDE(OP): Performed by: STUDENT IN AN ORGANIZED HEALTH CARE EDUCATION/TRAINING PROGRAM

## 2023-08-24 PROCEDURE — 700105 HCHG RX REV CODE 258

## 2023-08-24 PROCEDURE — A9270 NON-COVERED ITEM OR SERVICE: HCPCS | Performed by: STUDENT IN AN ORGANIZED HEALTH CARE EDUCATION/TRAINING PROGRAM

## 2023-08-24 PROCEDURE — A9270 NON-COVERED ITEM OR SERVICE: HCPCS | Mod: JZ

## 2023-08-24 PROCEDURE — 97602 WOUND(S) CARE NON-SELECTIVE: CPT

## 2023-08-24 RX ORDER — SULFAMETHOXAZOLE AND TRIMETHOPRIM 800; 160 MG/1; MG/1
1 TABLET ORAL EVERY 12 HOURS
Status: DISCONTINUED | OUTPATIENT
Start: 2023-08-24 | End: 2023-08-24

## 2023-08-24 RX ORDER — POTASSIUM CHLORIDE 20 MEQ/1
20 TABLET, EXTENDED RELEASE ORAL ONCE
Status: COMPLETED | OUTPATIENT
Start: 2023-08-24 | End: 2023-08-24

## 2023-08-24 RX ADMIN — LANSOPRAZOLE 30 MG: 30 TABLET, ORALLY DISINTEGRATING, DELAYED RELEASE ORAL at 05:35

## 2023-08-24 RX ADMIN — METOCLOPRAMIDE 10 MG: 10 TABLET ORAL at 11:41

## 2023-08-24 RX ADMIN — NYSTATIN 500000 UNITS: 100000 SUSPENSION ORAL at 11:41

## 2023-08-24 RX ADMIN — NYSTATIN 500000 UNITS: 100000 SUSPENSION ORAL at 16:15

## 2023-08-24 RX ADMIN — MIDODRINE HYDROCHLORIDE 2.5 MG: 5 TABLET ORAL at 17:54

## 2023-08-24 RX ADMIN — ASPIRIN 81 MG 81 MG: 81 TABLET ORAL at 05:35

## 2023-08-24 RX ADMIN — MIDODRINE HYDROCHLORIDE 2.5 MG: 5 TABLET ORAL at 11:42

## 2023-08-24 RX ADMIN — CEFTRIAXONE SODIUM 2000 MG: 10 INJECTION, POWDER, FOR SOLUTION INTRAVENOUS at 02:41

## 2023-08-24 RX ADMIN — METOCLOPRAMIDE 10 MG: 10 TABLET ORAL at 05:35

## 2023-08-24 RX ADMIN — NYSTATIN 500000 UNITS: 100000 SUSPENSION ORAL at 05:35

## 2023-08-24 RX ADMIN — METOCLOPRAMIDE 10 MG: 10 TABLET ORAL at 16:14

## 2023-08-24 RX ADMIN — MIDODRINE HYDROCHLORIDE 2.5 MG: 5 TABLET ORAL at 07:46

## 2023-08-24 RX ADMIN — CEFEPIME 2 G: 2 INJECTION, POWDER, FOR SOLUTION INTRAVENOUS at 17:44

## 2023-08-24 RX ADMIN — METOCLOPRAMIDE 10 MG: 10 TABLET ORAL at 00:15

## 2023-08-24 RX ADMIN — ENOXAPARIN SODIUM 40 MG: 100 INJECTION SUBCUTANEOUS at 16:14

## 2023-08-24 RX ADMIN — CEFEPIME 2 G: 2 INJECTION, POWDER, FOR SOLUTION INTRAVENOUS at 12:49

## 2023-08-24 RX ADMIN — ATORVASTATIN CALCIUM 80 MG: 80 TABLET, FILM COATED ORAL at 16:14

## 2023-08-24 RX ADMIN — POTASSIUM CHLORIDE 20 MEQ: 1500 TABLET, EXTENDED RELEASE ORAL at 07:46

## 2023-08-24 ASSESSMENT — ENCOUNTER SYMPTOMS
PHOTOPHOBIA: 0
ABDOMINAL PAIN: 0
PND: 0
CHILLS: 0
SHORTNESS OF BREATH: 0
COUGH: 0
ORTHOPNEA: 0
WHEEZING: 0
VOMITING: 0
NAUSEA: 0
EYE PAIN: 0
HEMOPTYSIS: 0
HEARTBURN: 0
SORE THROAT: 0
DOUBLE VISION: 0
FLANK PAIN: 0
FOCAL WEAKNESS: 1
FEVER: 0
PALPITATIONS: 0

## 2023-08-24 ASSESSMENT — PAIN SCALES - WONG BAKER: WONGBAKER_NUMERICALRESPONSE: DOESN'T HURT AT ALL

## 2023-08-24 ASSESSMENT — FIBROSIS 4 INDEX: FIB4 SCORE: 2.15

## 2023-08-24 NOTE — PROGRESS NOTES
"[facility] Internal Medicine Daily Progress Note    Date of Service  8/24/2023    R Team: R ARNALDO Flores Team   Attending: KALA Carrillo M.d.  Senior Resident: Dr. Esequiel Chun MD  Intern:  Dr. Evette sam MD  Contact Number: [phone]    Chief Complaint  Amanda Greenberg is a 77 y.o. male admitted 8/20/2023 with  dysuria and increase urinary frequency over the past week.    Hospital Course  Brenton Harvey is a 77 y.o. male with Pmhx of HTN and L MCA territory CVA 7/2023 c/b residual aphasia and R hemiparesis who presented 8/20/2023 via EMS from [facility] for \"sepsis symptoms\".      Communication with pt was done largely with yes or no questions given his aphasia.      Patient notes dysuria and inc urinary frequency over the past week. Denies ever having had these sx or a urinary tract infection in that past. Denies all other ROS symptoms including flank pain, hematuria, urinary dribbling, fevers, chills, and suprapubic pain. Spoke to patient daughter Haley and updated her regarding pt admission per pt request.    Imaging  CT AP w/ moderate stool burden and enlarged prostate. Otherwise notable for atherosclerotic CAD and bibasilar atelectasis.  XRAY WNL   EKG sinus tachycardia     In the ED, blood cultures were drawn and pt had ann placed and received ceftriaxone 2g x 1 dose. He also received 1L LR bolus and a dose of home midodrine (PM dose). S/p interventions pt with good blood pressure recovery and MAP well above 65. Patient admitted to telemetry for sepsis mgmt 2/2 urinary source.     8/23/2023 patient had 1 episode of the chest pain repeat EKG and troponin were insignificant for STEMI/NSTEMI.  Urine culture grew Klebsiella aurogenese, ceftriaxone switched to cefepime    Interval Problem Update    -Overnight event of failed voiding trial followed by bladder scan of 1200ml urine rentention followed by re-inserting ann's  -Pt tolerating feeding tube per dietitian recs.  -Pt was seen and evaluated " at bedside this AM, somewhat drowsy, denies any acute symptoms.  -vitals are stable.  -IV fluids and feeding tube conitnued.    I have discussed this patient's plan of care and discharge plan at IDT rounds today with Case Management, Nursing, Nursing leadership, and other members of the IDT team.    Consultants/Specialty       Code Status  DNAR/DNI    Disposition  The patient is not medically cleared for discharge to home or a post-acute facility.      I have placed the appropriate orders for post-discharge needs.    Review of Systems  Review of Systems   Constitutional:  Negative for chills, fever and malaise/fatigue.        ROS incomplete given aphasia and most answers were given as YES and NO   HENT:  Negative for nosebleeds and sore throat.    Eyes:  Negative for double vision, photophobia and pain.   Respiratory:  Negative for cough, hemoptysis, shortness of breath and wheezing.    Cardiovascular:  Negative for chest pain, palpitations, orthopnea and PND.   Gastrointestinal:  Negative for abdominal pain, heartburn, nausea and vomiting.        Vomiting after tube feed boluses   Genitourinary:  Negative for dysuria, flank pain and frequency.   Neurological:  Positive for focal weakness.        Right extremities weakness post-stroke        Physical Exam  Temp:  [36.3 °C (97.3 °F)-36.9 °C (98.5 °F)] 36.7 °C (98.1 °F)  Pulse:  [] 92  Resp:  [18] 18  BP: (103-127)/(75-81) 121/79  SpO2:  [95 %-96 %] 95 %    Physical Exam  Constitutional:       Appearance: He is ill-appearing.   HENT:      Mouth/Throat:      Comments: Oral thrush-improving  Eyes:      Extraocular Movements: Extraocular movements intact.      Pupils: Pupils are equal, round, and reactive to light.   Cardiovascular:      Rate and Rhythm: Normal rate.      Pulses: Normal pulses.   Pulmonary:      Effort: No respiratory distress.      Breath sounds: No stridor. No wheezing.   Abdominal:      General: Abdomen is flat. There is no distension.       Palpations: Abdomen is soft.      Tenderness: There is no abdominal tenderness.   Musculoskeletal:      Comments: Right extremities weakness post stroke   Neurological:      Mental Status: He is alert.      Cranial Nerves: Cranial nerve deficit present.      Motor: Weakness present.      Comments: Aphasia, dysphagia 2/2 CVA    Psychiatric:         Mood and Affect: Mood normal.         Behavior: Behavior normal.         Fluids    Intake/Output Summary (Last 24 hours) at 8/24/2023 0840  Last data filed at 8/24/2023 0836  Gross per 24 hour   Intake 120 ml   Output 2700 ml   Net -2580 ml         Laboratory  Recent Labs     08/22/23  0303 08/23/23  0119   WBC 12.2* 12.3*   RBC 3.92* 3.88*   HEMOGLOBIN 11.5* 11.6*   HEMATOCRIT 34.3* 34.4*   MCV 87.5 88.7   MCH 29.3 29.9   MCHC 33.5 33.7   RDW 47.5 47.9   PLATELETCT 171 189   MPV 10.0 9.8       Recent Labs     08/22/23  0303 08/23/23  0119 08/24/23  0507   SODIUM 133* 133* 132*   POTASSIUM 3.8 3.8 3.3*   CHLORIDE 101 103 99   CO2 24 23 24   GLUCOSE 92 114* 124*   BUN 19 21 16   CREATININE 0.70 0.70 0.60   CALCIUM 7.8* 7.8* 7.8*                     Imaging  CT-ABDOMEN-PELVIS WITH   Final Result         1.  Moderate quantity of stool in the colon, appearance favoring changes of constipation.   2.  Enlarged prostate, consider causes of prostate enlargement with additional workup as clinically appropriate.   3.  Hazy and linear densities the bilateral lung bases, appearance suggests atelectasis although component of infiltrate is not excluded.   4.  Atherosclerosis and atherosclerotic coronary artery disease      DX-CHEST-PORTABLE (1 VIEW)   Final Result         1.  No acute cardiopulmonary disease.             Assessment/Plan  Problem Representation:    * Sepsis (HCC)- (present on admission)  Assessment & Plan  This is Sepsis Present on admission  SIRS criteria identified on my evaluation include: Tachycardia, with heart rate greater than 90 BPM, Tachypnea, with respirations  greater than 20 per minute and Leukocytosis, with WBC greater than 12,000  Clinical indicators of end organ dysfunction include Lactic Acid greater than 2  Source is urinary  Sepsis protocol initiated    -Complete sepsis protocol fluids with additional 1.3L bolus LR  -Monitor hemodynamic status  -Cont home midodrine-- possible that some degree of pt's hypotension is from autonomic dysregulation from his CV  -Urine culture grew Klebseilla aerogeneses on 8/22/23  -Continue current antibiotics cefepime    Complicated UTI (urinary tract infection)- (present on admission)  Assessment & Plan  Dysuria + inc urinary frequency x 1 week with UA showing acute infection. Neg suprapubic tenderness, does have CVA tenderness today 8/23/23  S/p Ellison placement in ED     -Ceftriaxone 2g for minimum 7 days   -Blood culture grew no growth, Urine culture grew Klebseilla aerogeneses>100,000 on 8/22/23, resistant to ceftriaxone, we will stop it and start IV cefepime during inpatient stay. Switched to oral bactrim on d/c.    Localized swelling, mass or lump of neck  Assessment & Plan  Localized soft swelling on left anterior neck, history undetermined.  Does not complain of pain tenderness trouble breathing.    -Consider CT neck if sympomatic    Elevated troponin level not due to acute coronary syndrome- (present on admission)  Assessment & Plan  Trop T 37 on admission and EKG without ischemic findings. Likely type 2 NSTEMI/ demand ischemia in setting of sepsis as above.    -Continue telemetry    Hyponatremia- (present on admission)  Assessment & Plan  Admission Na 129, Cl 93 with dry mucus membranes on exam, likely hypovol hypoNa in setting of sepsis    -Fluid repletion per sepsis protocol   -Monitor with daily CMP  -8/23 Na 133    Constipated- (present on admission)  Assessment & Plan  Per CT AP this admission, pt high stool burden. On questioning, denies constipation     -PRN bowel reg- consider scheduling if pt unable to pass  BM    Dysphagia  Assessment & Plan  Post CVA dysphagia  Continue feeding tube.  Aspiration precautions.    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 acute ascending cholangitis etc)    -Resolved    Transaminitis- (present on admission)  Assessment & Plan  , AST 82-- downtrending from last admission. Patient without abdominal discomfort/ Vang sign  DDx: possible some component of mild sepsis/ shock liver (typically higher values..)    -Monitor with daily CMP  -towards improvement on daily CMP    Tachycardia- (present on admission)  Assessment & Plan  HR 120s on admission-- sinus on EKG, likely reflex tachycardia 2/2 distributive shock.    -Monitor on telemetry         VTE prophylaxis: enoxaparin ppx    I have performed a physical exam and reviewed and updated ROS and Plan today (8/24/2023). In review of yesterday's note (8/23/2023), there are no changes except as documented above.

## 2023-08-24 NOTE — CARE PLAN
The patient is Watcher - Medium risk of patient condition declining or worsening    Shift Goals  Clinical Goals: Abx, Tube feeding, PT/OT, Monitor cardiac  Patient Goals: BRENDAN  Family Goals: BRENDAN    Progress made toward(s) clinical / shift goals:      Problem: Knowledge Deficit - Standard  Goal: Patient and family/care givers will demonstrate understanding of plan of care, disease process/condition, diagnostic tests and medications    Description  Target End Date: 1-3 days or as soon as patient condition allows     Document in Patient Education   1. Patient and family/caregiver oriented to unit, equipment, visitation policy and means for communicating concern   2. Complete/review Learning Assessment   3. Assess knowledge level of disease process/condition, treatment plan, diagnostic tests and medications   4. Explain disease process/condition, treatment plan, diagnostic tests and medications    Outcome: Progressing  Note: Patient updated on plan of care, and patient care for shift. Patient only able to process simple instructions and explanations. Responds with gestures and nods. Patient responsive to commands.     --  Problem: Fall Risk  Goal: Patient will remain free from falls     Description  Target End Date: Prior to discharge or change in level of care      Document interventions on the Jose Trace Fall Risk Assessment   1. Assess for fall risk factors   2. Implement fall precautions     Outcome: Progressing  Note: Side rails up. Call button within reach. Bed alarm on. Rounded on patient frequently.    --     Problem: Pain - Standard  Goal: Alleviation of pain or a reduction in pain to the patient’s comfort goal     Description  Target End Date: Prior to discharge or change in level of care      Document on Vitals flowsheet   1. Document pain using the appropriate pain scale per order or unit policy   2. Educate and implement non-pharmacologic comfort measures (i.e. relaxation, distraction, massage, cold/heat  therapy, etc.)   3. Pain management medications as ordered   4. Reassess pain after pain med administration per policy   5. If opiods administered assess patient's response to pain medication is appropriate per POSS sedation scale 6. Follow pain management plan developed in collaboration with patient and interdisciplinary team (including palliative care or pain specialists if applicable)     Outcome: Progressing  Note: Patient unable to respond verbally; FLACC score of 0/10. Continued pain level assessment and monitoring during shift.     --  Problem: Skin Integrity  Goal: Skin integrity is maintained or improved     Description  Target End Date: Prior to discharge or change in level of care      Document interventions on Skin Risk/Rian flowsheet groups and corresponding LDA      1. Assess and monitor skin integrity, appearance and/or temperature   2. Assess risk factors for impaired skin integrity and/or pressures ulcers   3. Implement precautions to protect skin integrity in collaboration with interdisciplinary team   4. Implement pressure ulcer prevention protocol if at risk for skin breakdown   5. Confirm wound care consult if at risk for skin breakdown 6. Ensure patient use of pressure relieving devices (Low air loss bed, waffle overlay, heel protectors, ROHO cushion, etc)     Outcome: Progressing  Note: Encouraged turning side to side; on turning schedule q 2. Waffle mattress in place. Requested VIKRAM - pending availability. Tap system, and moon boots. Heel protectors in place. . Extra pillow used for positional support and heel offloading. Barrier cream applied as needed. Patient cleaned with gown and linens changed as appropriate.    --  Problem: Hemodynamics  Goal: Patient's hemodynamics, fluid balance and neurologic status will be stable or improve     Description  Target End Date: Prior to discharge or change in level of care      Document on Assessment and I/O flowsheet templates      1. Monitor vital  signs, pulse oximetry and cardiac monitor per provider order and/or policy   2. Maintain blood pressure per provider order   3. Hemodynamic monitoring per provider order   4. Manage IV fluids and IV infusions   5. Monitor intake and output   6. Daily weights per unit policy or provider order   7. Assess peripheral pulses and capillary refill   8. Assess color and body temperature       Outcome: Progressing  Note:  Monitored vitals  and l for abnormalities. Monitored LOC and sensorium. Telemetry monitoring in place, monitored for changes.      --  Problem: Risk for Aspiration  Goal: Patient's risk for aspiration will be absent or decrease     Description  Target End Date: Prior to discharge or change in level of care      1. Complete dysphagia screening on admission   2. NPO until dysphagia screening complete or medically cleared   3. Collaborate with Speech Therapy, Clinical Dietitian and interdisciplinary team   4. Implement aspiration precautions   5. Assist patient up to chair for meals   6. Elevate head of bed 90 degrees if patient is unable to get out of bed   7. Encourage small bites   8. Ensure foods/liquids are of appropriate consistency   9. Assess for any signs/symptoms of aspiration 10. Assess breath sounds and vital signs after oral intake     Outcome: Progressing  Note:  Monitored and assessed for LOC. Patient awake and follow instructions. Elevated HOB> 30. On NPO with meds..     --  Problem: Nutrition  Goal: Enteral nutrition will be maintained or improve     Description  Target End Date: Prior to discharge or change in level of care   1. Enteral access to be obtained or modified   2. Advance to goal rate per protocol   3. Collaborate with Clinical Dietitian for signs/symptoms of intolerance   4. Weights per provider order      Outcome: Progressing  Note:  PEG tube in place,; checked for placement via auscultation and aspiration (+) gastric aspirate. Patient on continuous feeding; titrated  accordingly. Checked for tolerance and residuals.

## 2023-08-24 NOTE — PROGRESS NOTES
Void trial attempted on patient; IFC removed at 2300. Bladder scan done with 1200 retention. Patient's CT showed enlarged prostate. Updated Dr. Nugent; requested IFC order, and inquired need for prostate work-up and medication.    0315 Order obtained from Dr. Nugent; insert IFC. Prostate work-up and medication deferred to day team.

## 2023-08-24 NOTE — CARE PLAN
The patient is Stable - Low risk of patient condition declining or worsening    Shift Goals  Clinical Goals: rest, comfort  Patient Goals: BRENDAN  Family Goals: BRENDAN    Progress made toward(s) clinical / shift goals:        Problem: Nutrition  Goal: Patient's nutritional and fluid intake will be adequate or improve  Outcome: Not Progressing  Note: Pt unable to tolerate bolus tube feedings, switched to continuous feedings at 25 ml /hr      Problem: Skin Integrity  Goal: Skin integrity is maintained or improved  Outcome: Progressing  Note: Pressure relieving devices along with Q2 turns in place

## 2023-08-24 NOTE — PROGRESS NOTES
Telemetry Report:      SR/ST   PAC, PVC, Couplet  0.17/0.06/0.33    Per Telestrip from Monitor Room

## 2023-08-24 NOTE — PROGRESS NOTES
Medical Student Progress Note    Date of Service: 8/24/2023  Primary Team: [facility] IM [facility] Team   Attending: KALA Carrillo M.D.   Senior Resident: Dr. Chun  Intern: Dr. Frey  Contact:  [phone]    Chief Complaint:   GI bleed    Subjective: Pt is 76yo M here HOD4 for dysuria and urinary frequency.    Since changing his feeding to regimen from boluses to titrating, as well as adding Reglan, he is now at goal feeds of 50ml/hr, and tolerating them well.     I couldn't speak with him today as he was somnolent to the point of being unarousable, though the nurse mentioned no mention of new symptoms or any symptoms over the day.      Objective Data:   Physical Exam:   Vitals:   Temp:  [36.3 °C (97.3 °F)-36.7 °C (98.1 °F)] 36.6 °C (97.8 °F)  Pulse:  [67-99] 99  Resp:  [18] 18  BP: (114-137)/(78-89) 137/89  SpO2:  [95 %-96 %] 95 %   Physical Exam  Nursing note reviewed.           Labs:   His most recent Na 132, from 133. K of 3.3 from 3.8 (repleting now). Alb of 2.4 from 2.3.   His klebsiella was resistant to ceftriaxone, though is sensitive to several other abx, including cefepime and TMP-SMX.    Imaging:   No new imaging    Problem Representation:   Sepsis- pt currently meetings pulse and WBC requirements of SIRS and has urinary source. Continue fluids and midodrine.  UTI- dysuria coupled with Klebsiella positive urine culture. See plan above.  L neck mass- unchanged appearance since yesterday, unable to contact emergency contact. Consider neck CT.  Hypophosphatemia- most recent value 2.2, likely 2nd to refeeding. Continue repletion of K/Mg/P and reassess P in the next AM.  CP- likely 2nd to feeding intolerance/gastroparesis, pt asymptomatic per nurse. Continue reglan.  CVA deficits- continue ASA, statin, and midodrine. Hold plavix. Await PT/OT.

## 2023-08-25 ENCOUNTER — PATIENT OUTREACH (OUTPATIENT)
Dept: SCHEDULING | Facility: IMAGING CENTER | Age: 78
End: 2023-08-25
Payer: MEDICARE

## 2023-08-25 VITALS
OXYGEN SATURATION: 95 % | WEIGHT: 159.17 LBS | BODY MASS INDEX: 19.79 KG/M2 | HEIGHT: 75 IN | HEART RATE: 94 BPM | TEMPERATURE: 97.9 F | DIASTOLIC BLOOD PRESSURE: 77 MMHG | SYSTOLIC BLOOD PRESSURE: 114 MMHG | RESPIRATION RATE: 18 BRPM

## 2023-08-25 LAB
ALBUMIN SERPL BCP-MCNC: 2.5 G/DL (ref 3.2–4.9)
ALBUMIN/GLOB SERPL: 0.7 G/DL
ALP SERPL-CCNC: 288 U/L (ref 30–99)
ALT SERPL-CCNC: 54 U/L (ref 2–50)
ANION GAP SERPL CALC-SCNC: 9 MMOL/L (ref 7–16)
AST SERPL-CCNC: 45 U/L (ref 12–45)
BILIRUB SERPL-MCNC: 0.5 MG/DL (ref 0.1–1.5)
BUN SERPL-MCNC: 16 MG/DL (ref 8–22)
CALCIUM ALBUM COR SERPL-MCNC: 9.5 MG/DL (ref 8.5–10.5)
CALCIUM SERPL-MCNC: 8.3 MG/DL (ref 8.5–10.5)
CHLORIDE SERPL-SCNC: 97 MMOL/L (ref 96–112)
CO2 SERPL-SCNC: 23 MMOL/L (ref 20–33)
CREAT SERPL-MCNC: 0.48 MG/DL (ref 0.5–1.4)
ERYTHROCYTE [DISTWIDTH] IN BLOOD BY AUTOMATED COUNT: 45.2 FL (ref 35.9–50)
GFR SERPLBLD CREATININE-BSD FMLA CKD-EPI: 107 ML/MIN/1.73 M 2
GLOBULIN SER CALC-MCNC: 3.4 G/DL (ref 1.9–3.5)
GLUCOSE SERPL-MCNC: 115 MG/DL (ref 65–99)
HCT VFR BLD AUTO: 35.2 % (ref 42–52)
HGB BLD-MCNC: 11.7 G/DL (ref 14–18)
MCH RBC QN AUTO: 29.2 PG (ref 27–33)
MCHC RBC AUTO-ENTMCNC: 33.2 G/DL (ref 32.3–36.5)
MCV RBC AUTO: 87.8 FL (ref 81.4–97.8)
PHOSPHATE SERPL-MCNC: 2.6 MG/DL (ref 2.5–4.5)
PLATELET # BLD AUTO: 205 K/UL (ref 164–446)
PMV BLD AUTO: 9.3 FL (ref 9–12.9)
POTASSIUM SERPL-SCNC: 3.5 MMOL/L (ref 3.6–5.5)
PROT SERPL-MCNC: 5.9 G/DL (ref 6–8.2)
RBC # BLD AUTO: 4.01 M/UL (ref 4.7–6.1)
SODIUM SERPL-SCNC: 129 MMOL/L (ref 135–145)
WBC # BLD AUTO: 7.5 K/UL (ref 4.8–10.8)

## 2023-08-25 PROCEDURE — A9270 NON-COVERED ITEM OR SERVICE: HCPCS

## 2023-08-25 PROCEDURE — 99239 HOSP IP/OBS DSCHRG MGMT >30: CPT | Performed by: HOSPITALIST

## 2023-08-25 PROCEDURE — 700102 HCHG RX REV CODE 250 W/ 637 OVERRIDE(OP): Performed by: STUDENT IN AN ORGANIZED HEALTH CARE EDUCATION/TRAINING PROGRAM

## 2023-08-25 PROCEDURE — 80053 COMPREHEN METABOLIC PANEL: CPT

## 2023-08-25 PROCEDURE — A9270 NON-COVERED ITEM OR SERVICE: HCPCS | Performed by: HOSPITALIST

## 2023-08-25 PROCEDURE — 84100 ASSAY OF PHOSPHORUS: CPT

## 2023-08-25 PROCEDURE — 700102 HCHG RX REV CODE 250 W/ 637 OVERRIDE(OP): Performed by: HOSPITALIST

## 2023-08-25 PROCEDURE — A9270 NON-COVERED ITEM OR SERVICE: HCPCS | Performed by: STUDENT IN AN ORGANIZED HEALTH CARE EDUCATION/TRAINING PROGRAM

## 2023-08-25 PROCEDURE — A9270 NON-COVERED ITEM OR SERVICE: HCPCS | Mod: JZ

## 2023-08-25 PROCEDURE — 700102 HCHG RX REV CODE 250 W/ 637 OVERRIDE(OP)

## 2023-08-25 PROCEDURE — 36415 COLL VENOUS BLD VENIPUNCTURE: CPT

## 2023-08-25 PROCEDURE — 700102 HCHG RX REV CODE 250 W/ 637 OVERRIDE(OP): Mod: JZ

## 2023-08-25 PROCEDURE — 700111 HCHG RX REV CODE 636 W/ 250 OVERRIDE (IP): Mod: JZ

## 2023-08-25 PROCEDURE — 85027 COMPLETE CBC AUTOMATED: CPT

## 2023-08-25 PROCEDURE — 700105 HCHG RX REV CODE 258

## 2023-08-25 RX ORDER — SULFAMETHOXAZOLE AND TRIMETHOPRIM 800; 160 MG/1; MG/1
1 TABLET ORAL EVERY 12 HOURS
Status: DISCONTINUED | OUTPATIENT
Start: 2023-08-25 | End: 2023-08-25 | Stop reason: HOSPADM

## 2023-08-25 RX ORDER — SULFAMETHOXAZOLE AND TRIMETHOPRIM 800; 160 MG/1; MG/1
1 TABLET ORAL EVERY 12 HOURS
Qty: 11 TABLET | Refills: 0 | Status: ACTIVE | OUTPATIENT
Start: 2023-08-25 | End: 2023-08-31

## 2023-08-25 RX ORDER — SULFAMETHOXAZOLE AND TRIMETHOPRIM 400; 80 MG/1; MG/1
1 TABLET ORAL EVERY 12 HOURS
Qty: 20 TABLET | Refills: 0 | Status: SHIPPED | OUTPATIENT
Start: 2023-08-25 | End: 2023-08-25

## 2023-08-25 RX ORDER — SULFAMETHOXAZOLE AND TRIMETHOPRIM 400; 80 MG/1; MG/1
1 TABLET ORAL EVERY 12 HOURS
Status: DISCONTINUED | OUTPATIENT
Start: 2023-08-25 | End: 2023-08-25

## 2023-08-25 RX ORDER — POTASSIUM CHLORIDE 20 MEQ/1
40 TABLET, EXTENDED RELEASE ORAL ONCE
Status: DISCONTINUED | OUTPATIENT
Start: 2023-08-25 | End: 2023-08-25

## 2023-08-25 RX ORDER — POTASSIUM CHLORIDE 20 MEQ/1
40 TABLET, EXTENDED RELEASE ORAL ONCE
Status: COMPLETED | OUTPATIENT
Start: 2023-08-25 | End: 2023-08-25

## 2023-08-25 RX ADMIN — CEFEPIME 2 G: 2 INJECTION, POWDER, FOR SOLUTION INTRAVENOUS at 05:27

## 2023-08-25 RX ADMIN — METOCLOPRAMIDE 10 MG: 10 TABLET ORAL at 05:24

## 2023-08-25 RX ADMIN — POTASSIUM CHLORIDE 40 MEQ: 1500 TABLET, EXTENDED RELEASE ORAL at 08:28

## 2023-08-25 RX ADMIN — METOCLOPRAMIDE 10 MG: 10 TABLET ORAL at 12:55

## 2023-08-25 RX ADMIN — NYSTATIN 500000 UNITS: 100000 SUSPENSION ORAL at 12:55

## 2023-08-25 RX ADMIN — NYSTATIN 500000 UNITS: 100000 SUSPENSION ORAL at 05:24

## 2023-08-25 RX ADMIN — LANSOPRAZOLE 30 MG: 30 TABLET, ORALLY DISINTEGRATING, DELAYED RELEASE ORAL at 05:24

## 2023-08-25 RX ADMIN — ASPIRIN 81 MG 81 MG: 81 TABLET ORAL at 05:24

## 2023-08-25 RX ADMIN — MIDODRINE HYDROCHLORIDE 2.5 MG: 5 TABLET ORAL at 12:55

## 2023-08-25 RX ADMIN — MIDODRINE HYDROCHLORIDE 2.5 MG: 5 TABLET ORAL at 08:28

## 2023-08-25 RX ADMIN — METOCLOPRAMIDE 10 MG: 10 TABLET ORAL at 00:11

## 2023-08-25 ASSESSMENT — PAIN DESCRIPTION - PAIN TYPE: TYPE: ACUTE PAIN

## 2023-08-25 NOTE — DISCHARGE PLANNING
DC Transport Scheduled    Received request at: 8/25/2023 at 1244    Transport Company Scheduled:  [employer]  Spoke with FISH at [facility] to schedule transport.    Scheduled Date: 8/25/2023  Scheduled Time: 1500    Destination: [facility] at [address] [place]    Notified care team of scheduled transport via Voalte.     If there are any changes needed to the DC transportation scheduled, please contact [facility] at ext. [phone] between the hours of 2556-0541 Mon-Fri. If outside those hours, contact the ED Case Manager at ext. [phone].

## 2023-08-25 NOTE — WOUND TEAM
"[facility]  Inpatient Services  Initial Wound and Skin Care Evaluation    Admission Date: 8/20/2023     Last order of IP CONSULT TO WOUND CARE was found on 8/21/2023 from Hospital Encounter on 8/20/2023     HPI, PMH, SH: Reviewed    Past Surgical History:   Procedure Laterality Date    WV PLACE PERCUT GASTROSTOMY TUBE N/A 7/24/2023    Procedure: EGD, WITH PEG TUBE INSERTION;  Surgeon: Brenda Garcia M.D.;  Location: SURGERY SAME DAY [facility];  Service: Gastroenterology    WV UPPER GI ENDOSCOPY,DIAGNOSIS N/A 7/24/2023    Procedure: GASTROSCOPY;  Surgeon: Brenda Garcia M.D.;  Location: SURGERY SAME DAY [facility];  Service: Gastroenterology     Social History     Tobacco Use    Smoking status: Not on file    Smokeless tobacco: Not on file   Substance Use Topics    Alcohol use: Not on file     Chief Complaint   Patient presents with    Hypotension     BIBA from [facility] for sepsis symptoms   Opn EMS arrival BP 80/50, 128 heart rate, 28 resp, 100.1 fever   Pt recently treated for UTI  Pt given 400ml LR,   Baseline aphasia, R hemiplegia, Peg tube     Diagnosis: Sepsis (HCC) [A41.9]    Unit where seen by Wound Team: S143/00     WOUND CONSULT RELATED TO:  Coccyx    WOUND TEAM PLAN OF CARE - Frequency of Follow-up:   Nursing to follow dressing orders written for wound care. Contact wound team if area fails to progress, deteriorates or with any questions/concerns if something comes up before next scheduled follow up (See below as to whether wound is following and frequency of wound follow up)   Bi-Monthly - 9/7/23    WOUND HISTORY:   Brenton Harvey is a 77 y.o. 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      Communication with pt was done largely with yes or no questions given his aphasia.      Patient notes dysuria and inc urinary frequency over the past week. Denies ever " having had these sx or a urinary tract infection in that past. Denies all other ROS symptoms including flank pain, hematuria, urinary dribbling, fevers, chills, and suprapubic pain. Spoke to patient daughter Haley and updated her regarding pt admission per pt request.    Imaging  CT AP w/ moderate stool burden and enlarged prostate. Otherwise notable for atherosclerotic CAD and bibasilar atelectasis.  XRAY WNL   EKG sinus tachycardia     In the ED, blood cultures were drawn and pt had ann placed and received ceftriaxone 2g x 1 dose. He also received 1L LR bolus and a dose of home midodrine (PM dose). S/p interventions pt with good blood pressure recovery and MAP well above 65. Patient admitted to telemetry for sepsis mgmt 2/2 urinary source.      8/23/2023 patient had 1 episode of the chest pain repeat EKG and troponin were insignificant for STEMI/NSTEMI.  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)   Date First Assessed/Time First Assessed: 08/21/23 1149   Present on Original Admission: Yes  Hand Hygiene Completed: Yes  Primary Wound Type: Pressure Injury  Location: Coccyx  Wound Description (Comments): POA STAGE 3      Assessments 8/24/2023  5:00 PM   Wound Image     Site Assessment Pink;Yellow;Granulation tissue;Slough;Drainage   Periwound Assessment Non-blanchable erythema   Margins Attached edges;Defined edges   Closure Secondary intention   Drainage Amount Scant   Drainage Description Serosanguineous   Treatments Cleansed;Nonselective debridement;Site care;Offloading   Wound Cleansing Foam Cleanser/Washcloth   Periwound Protectant No-sting Skin Prep   Dressing Status Clean;Dry;Intact   Dressing Changed New   Dressing Cleansing/Solutions Not Applicable   Dressing Options Hydrofera Blue Ready;Offloading Dressing - Sacral   Dressing Change/Treatment Frequency Every 48 hrs, and As Needed   NEXT  Dressing Change/Treatment Date 08/26/23   NEXT Weekly Photo (Inpatient Only) 08/30/23   Wound Team Following Bi-Monthly   Pressure Injury Stage Stage 3        Vascular:    DIMITRIS:   No results found.    Lab Values:    Lab Results   Component Value Date/Time    WBC 12.3 (H) 08/23/2023 01:19 AM    RBC 3.88 (L) 08/23/2023 01:19 AM    HEMOGLOBIN 11.6 (L) 08/23/2023 01:19 AM    HEMATOCRIT 34.4 (L) 08/23/2023 01:19 AM    CREACTPROT 17.17 (H) 08/23/2023 01:19 AM    HBA1C 5.1 07/15/2023 03:20 AM         Culture Results show:  No results found for this or any previous visit (from the past 720 hour(s)).    Pain Level/Medicated:  None, Tolerated without pain medication       INTERVENTIONS BY WOUND TEAM:  Chart and images reviewed. Discussed with bedside RN. All areas of concern (based on picture review, LDA review and discussion with bedside RN) have been thoroughly assessed. Documentation of areas based on significant findings. This RN in to assess patient. Performed standard wound care which includes appropriate positioning, dressing removal and non-selective debridement. Pictures and measurements obtained weekly if/when required.    Wound:  COCCYX POA STAGE 3  Preparation for Dressing removal: Open to air  Cleansed/Non-selectively Debrided with:  No rinse foam soap and Moist warm washcloth  Sabina wound: Cleansed with No rinse foam soap and Moist warm washcloth, Prepped with No Sting  Primary Dressing:  hydrofera blue  Secondary (Outer) Dressing: foam dressing    Advanced Wound Care Discharge Planning  Number of Clinicians necessary to complete wound care: 1  Is patient requiring IV pain medications for dressing changes:  No   Length of time for dressing change 30 min. (This does not include chart review, pre-medication time, set up, clean up or time spent charting.)    Interdisciplinary consultation: Patient, Bedside RN     EVALUATION / RATIONALE FOR TREATMENT:     Date:  08/24/23  Wound Status:  Initial evaluation  Patient  with open ulcer to coccyx with yellow slough and pink granulation tissue, periwound is non-blanching and intact. Hydrofera Blue applied for the hydrophilic polyurethane foam which contains ethylene oxide used as a bactericidal, fungicidal, and sporicidal disinfectant. Hydrofera Blue also aids in maintaining a moist wound environment. The absorption properties of this dressing are important in collecting exudates and bacteria from the injured area. These harmful fluid secretions bind to the dressing removing it from the wound without the foam sticking to the wound causing more harm. Covered with foam dressing.       Goals: Steady decrease in wound area and depth weekly.    NURSING PLAN OF CARE ORDERS:  Dressing changes: See Dressing Care orders  Skin care: See Skin Care orders    NUTRITION RECOMMENDATIONS   Wound Team Recommendations:  High protein diet    DIET ORDERS (From admission to next 24h)       Start     Ordered    08/23/23 1507  Diet: Diet Tube Feed; Formula: Fibersource HN; Goal Rate (mL/Hour): 65; Duration: 24 HR; Tube Feed Time Unit: Hours/Day  ALL MEALS        Comments: Start @ 25 mL/hr and advance per protocol to goal rate of 65 mL/hr.   Question Answer Comment   Diet Diet Tube Feed    Formula: Fibersource HN    Goal Rate (mL/Hour) 65    Route Gtube/PEG    Duration 24 HR    Tube Feed Time Unit Hours/Day        08/23/23 1507    08/21/23 0219  Diet NPO Restrict to: Sips with Medications  ALL MEALS        Question:  Diet NPO Restrict to:  Answer:  Sips with Medications    08/21/23 0221                    PREVENTATIVE INTERVENTIONS:    Q shift Rian - performed per nursing policy  Q shift pressure point assessments - performed per nursing policy    Surface/Positioning  Waffle overlay  - Currently in Place    Offloading/Redistribution  Sacral offloading dressing (Silicone dressing) - Currently in Place  Heel float boots (Prevalon boot) - Currently in Place  Float Heels off Bed with Pillows - Currently in  Place         Anticipated discharge plans:  TBD        Vac Discharge Needs:  Vac Discharge plan is purely a recommendation from wound team and not a requirement for discharge unless otherwise stated by physician.  Not Applicable Pt not on a wound vac

## 2023-08-25 NOTE — CARE PLAN
The patient is Watcher - Medium risk of patient condition declining or worsening    Shift Goals  Clinical Goals: ABX, Continuous Fibersource 65 cc/hr  Patient Goals: Rest  Family Goals: cuco    Progress made toward(s) clinical / shift goals:        Problem: Knowledge Deficit - Standard  Goal: Patient and family/care givers will demonstrate understanding of plan of care, disease process/condition, diagnostic tests and medications  Outcome: Progressing  Note: SCD on working Pt understands it is to help with circulation/prevent DVT      Problem: Skin Integrity  Goal: Skin integrity is maintained or improved  Outcome: Progressing  Note: Skin remains fragile pressure relieving devices in place,  q 2 hr repositioning, boots on bilat feet.       Problem: Fall Risk  Goal: Patient will remain free from falls  Outcome: Progressing  Note: Pt remained safe with bed in low, locked position, call light within reach of his left hand.      Problem: Nutrition  Goal: Patient's nutritional and fluid intake will be adequate or improve  Outcome: Progressing  Note: Pt  receives 65 cc /hr of Fibersource; free water via gastric tube.             Patient is not progressing towards the following goals:

## 2023-08-25 NOTE — PROGRESS NOTES
Medical Student Progress Note    Date of Service: 8/25/2023  Primary Team: [facility] IM Gray Team   Attending: KALA Carrillo M.D.   Senior Resident: Dr. Chun  Intern: Dr. Frey  Contact:  [phone]    Chief Complaint:   Dysuria and urinary frequency    Subjective: Pt is 76yo M here HOD 5 for dysuria and urinary frequency. Pt had no overnight changes and is still tolerating feeds well.     He is feeling better today, with no new symptoms. He denies any existing symptoms, including dysphagia, flank pain, nor dysuria.    Upon talking with him and using yes-no answers, I believe I gathered that his L neck mass has been present for over 10 years.      Objective Data:   Physical Exam:   Vitals:   Temp:  [36.2 °C (97.1 °F)-37.1 °C (98.7 °F)] 36.2 °C (97.1 °F)  Pulse:  [78-99] 94  Resp:  [18] 18  BP: (106-137)/(71-89) 111/80  SpO2:  [95 %-97 %] 96 %   Physical Exam  HENT:      Mouth/Throat:      Mouth: Mucous membranes are moist.      Tongue: Lesions present.      Comments: Mild thrush on tongue.  Neck:        Comments: L nontender fluctuant mass noted. Present for many years.  Abdominal:      Tenderness: There is no right CVA tenderness or left CVA tenderness.   Musculoskeletal:         General: No swelling.   Neurological:      Motor: Weakness present.           Labs:   WBC markedly improved at 7.5 (from 12.3 yesterday). Na today is 129, from 132. K now 3.5, from 3.3. P now 2.6 from 2.2.    Imaging:   No new imaging    Problem Representation:   Sepsis- SIRS criteria no longer met, indicating cefepime having an effect. Continue cefepime 2g BID until discharge ready at [facility].  UTI- dysuria present though culture grew K. Aerogenes resistant to ceftriaxone. See plan above, and consider outpt switch to TMP-SMX upon discharge.  L neck mass- possibly present for over 10 years per talking to pt. Likely benign.  Hyponatremia- 129 today and euvolemic. Consider ordering FeNa to evaluate decision on fluids.  CVA deficits-  continue ASA and statin, as well as midodrine. Appreciate PT/OT recs.

## 2023-08-25 NOTE — DIETARY
Nutrition Update:    Day 4 of admit.  Amanda Greenberg is a 77 y.o. male with admitting DX of Sepsis (HCC) [A41.9].  Patient being followed to optimize nutrition.    Current TF orders: Fibersource HN @ 65 ml/hr. Per I/O, TF running at goal on 8/24.     Problem: Nutritional:  Goal: Achieve adequate nutritional intake  Description: TF to meet 100% of estimated needs.   Outcome: Goal met. RD following.

## 2023-08-25 NOTE — DISCHARGE INSTRUCTIONS
Indwelling Urinary Catheter Care  You have been given a flexible tube (catheter) used to drain the bladder. Catheters are often used when a person has difficulty urinating due to blockage, bleeding, infection, or inability to control bladder or bowel movements (incontinence). A catheter requires daily care to prevent infection and blockage.  HOME CARE INSTRUCTIONS   Do the following to reduce the risk of infection. Antibiotic medicines cannot prevent infections.  Limit the number of bacteria entering your bladder  Wash your hands for 2 minutes with soapy water before and after handling the catheter.  Wash your bottom and the entire catheter twice daily, as well as after each bowel movement. Wash the tip of the penis or just above the vaginal opening with soap and warm water, rinse, and then wash the rectal area. Always wash from front to back.  When changing from the leg bag to overnight bag or from the overnight bag to leg bag, thoroughly clean the end of the catheter where it connects to the tubing with an alcohol wipe.  Clean the leg bag and overnight bag daily after use. Replace your drainage bags weekly.  Always keep the tubing and bag below the level of your bladder. This allows your urine to drain properly. Lifting the bag or tubing above the level of your bladder will cause dirty urine to flow back into your bladder. If you must briefly lift the bag higher than your bladder, pinch the catheter or tubing to prevent backflow.  Drink enough water and fluids to keep your urine clear or pale yellow, or as directed by your caregiver. This will flush bacteria out of the bladder.  Protect tissues from injury  Attach the catheter to your leg so there is no tension on the catheter. Use adhesive tape or a leg strap. If you are using adhesive tape, remove any sticky residue left behind by the previous tape you used.  Place your leg bag on your lower leg. Fasten the straps securely and comfortably.  Do not remove the  catheter yourself unless you have been instructed how to do so.  Keep the urinary pathway open  Check throughout the day to be sure your catheter is working and urine is draining freely. Make sure the tubing does not become kinked.  Do not let the drainage bag overfill.  SEEK IMMEDIATE MEDICAL CARE IF:   The catheter becomes blocked. Urine is not draining.  Urine is leaking.  You have any pain.  You have a fever.  Document Released: 12/18/2006 Document Revised: 12/04/2013 Document Reviewed: 05/19/2011  [email or web address] Patient Information ©2014 [email or web address], [employer].

## 2023-08-25 NOTE — DISCHARGE PLANNING
Case Management Discharge Planning    Admission Date: 8/20/2023  GMLOS: 4.8  ALOS: 4    6-Clicks ADL Score: 6  6-Clicks Mobility Score: 6  PT and/or OT Eval ordered: Yes  Post-acute Referrals Ordered: Yes  Post-acute Choice Obtained: Yes  Has referral(s) been sent to post-acute provider:  Yes      Anticipated Discharge Dispo: Discharge Disposition: D/T to SNF with Medicare cert in anticipation of skilled care (03)  Discharge Address: [facility]    DME Needed: No    Action(s) Taken: Updated Provider/Nurse on Discharge Plan and OTHER    Escalations Completed: None    Medically Clear: Yes    Next Steps: LSW to follow up with patient and medical team regarding discharge needs and barriers.     Barriers to Discharge: None    **1100  Patient discussed during IDT rounds. Patient is medically clear.     **1201  LSW faxed transport request to [facility]. Call placed to patient's conservator, Teri, informed her of transport to [facility] today. No questions or concerns at this time. Providers informed of pending time to [facility] at 1500.     **4930  Transfer packet handed to RNHari.

## 2023-08-25 NOTE — DISCHARGE PLANNING
Agency/Facility Name: [facility]  Spoke To: Mi   Outcome: DPA called for any beds available today, per Mi they can accept patient today. Mi requested DPA to set transport for 1500. DPA will call back with confirmed time.

## 2023-08-25 NOTE — DISCHARGE SUMMARY
"[facility] Discharge Summary    Attending: KALA Carrillo M.d.  Senior Resident: Dr. Ha Chun  Intern:  Dr. Evette Frey  Contact Number: [phone]    CHIEF COMPLAINT ON ADMISSION  Chief Complaint   Patient presents with    Hypotension     BIBA from [facility] for sepsis symptoms   Opn EMS arrival BP 80/50, 128 heart rate, 28 resp, 100.1 fever   Pt recently treated for UTI  Pt given 400ml LR,   Baseline aphasia, R hemiplegia, Peg tube       Reason for Admission  Sepsis (HCC)     Admission Date  8/20/2023    CODE STATUS  DNAR/DNI    HPI & HOSPITAL COURSE  This is a 77 y.o. male here with sepsis  Brenton Harvey is a 77 y.o. 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      Communication with pt was done largely with yes or no questions given his aphasia.      Patient notes dysuria and inc urinary frequency over the past week. Denies ever having had these sx or a urinary tract infection in that past. Denies all other ROS symptoms including flank pain, hematuria, urinary dribbling, fevers, chills, and suprapubic pain. Spoke to patient daughter Haley and updated her regarding pt admission per pt request.    Imaging  CT AP w/ moderate stool burden and enlarged prostate. Otherwise notable for atherosclerotic CAD and bibasilar atelectasis.  XRAY WNL   EKG sinus tachycardia     In the ED, blood cultures were drawn and pt had ann placed and received ceftriaxone 2g x 1 dose. He also received 1L LR bolus and a dose of home midodrine (PM dose). S/p interventions pt with good blood pressure recovery and MAP well above 65. Patient admitted to telemetry for sepsis mgmt 2/2 urinary source.     8/23/2023 patient had 1 episode of the chest pain repeat EKG and troponin were insignificant for STEMI/NSTEMI.  Urine culture grew Klebsiella aurogenese, ceftriaxone switched to cefepime.  " Patient was transitioned to oral Bactrim for remainder of course at discharge.    Therefore, he is discharged in good and stable condition to skilled nursing facility.    The patient met 2-midnight criteria for an inpatient stay at the time of discharge.    Discharge Date  August 25    Physical Exam on Day of Discharge  Physical Exam  Vitals and nursing note reviewed.   Constitutional:       General: He is not in acute distress.     Appearance: Normal appearance. He is not ill-appearing.   HENT:      Head: Normocephalic and atraumatic.      Right Ear: External ear normal.      Left Ear: External ear normal.      Mouth/Throat:      Comments: Oral thrush-improving  Eyes:      Extraocular Movements: Extraocular movements intact.      Pupils: Pupils are equal, round, and reactive to light.   Cardiovascular:      Rate and Rhythm: Normal rate and regular rhythm.      Pulses: Normal pulses.   Pulmonary:      Effort: No respiratory distress.      Breath sounds: No stridor. No wheezing.   Abdominal:      General: Abdomen is flat. There is no distension.      Palpations: Abdomen is soft.      Tenderness: There is no abdominal tenderness.   Musculoskeletal:         General: No deformity or signs of injury.      Comments: Right extremities weakness post stroke   Skin:     General: Skin is warm.      Findings: No bruising or erythema.   Neurological:      Mental Status: He is alert. Mental status is at baseline.      Cranial Nerves: Cranial nerve deficit present.      Motor: Weakness present.      Comments: Aphasia, dysphagia 2/2 CVA          FOLLOW UP ITEMS POST DISCHARGE  Post stroke follow-up.    DISCHARGE DIAGNOSES  Principal Problem:    Sepsis (HCC) (POA: Yes)  Active Problems:    Chronic ischemic left MCA stroke (POA: Yes)    Dysphagia (POA: Unknown)    Complicated UTI (urinary tract infection) (POA: Yes)    Tachycardia (POA: Yes)    Constipated (POA: Yes)    Hyponatremia (POA: Yes)    Transaminitis (POA: Yes)     Hyperbilirubinemia (POA: Yes)    Lactic acidosis (POA: Yes)    Elevated troponin level not due to acute coronary syndrome (POA: Yes)    Localized swelling, mass or lump of neck (POA: Unknown)  Resolved Problems:    * No resolved hospital problems. *      FOLLOW UP  No future appointments.  primary care provider    Follow up  Please call your primary care provider to schedule a hospital follow up. Thank you.    [facility]  [address] [place] [address]  [phone]    Call in 2 week(s)        MEDICATIONS ON DISCHARGE     Medication List        START taking these medications        Instructions   nystatin 154996 UNIT/ML Susp  Commonly known as: Mycostatin  Next Dose Due: 8/25   Take 5 mL by mouth 3 times a day for 14 days.  Dose: 5 mL     sulfamethoxazole-trimethoprim 800-160 MG tablet  Commonly known as: Bactrim DS  Next Dose Due: 8/25   1 Tablet by Enteral Tube route every 12 hours for 11 doses.  Dose: 1 Tablet            CONTINUE taking these medications        Instructions   acetaminophen 325 MG Tabs  Commonly known as: Tylenol  Next Dose Due: Anytime   650 mg by Enteral Tube route every four hours as needed. Indications: Pain  Dose: 650 mg     aspirin 81 MG Chew chewable tablet  Commonly known as: Asa  Next Dose Due: 8/26   1 Tablet by Enteral Tube route every day.  Dose: 81 mg     atorvastatin 80 MG tablet  Commonly known as: Lipitor  Next Dose Due: 8/25   1 Tablet by Enteral Tube route every evening.  Dose: 80 mg     clopidogrel 75 MG Tabs  Commonly known as: Plavix  Next Dose Due: 8/26   1 Tablet by Enteral Tube route every day.  Dose: 75 mg     metoclopramide 10 MG Tabs  Commonly known as: Reglan   10 mg by Enteral Tube route every 6 hours as needed. Indications: Nausea and Vomiting  Dose: 10 mg     midodrine 2.5 MG Tabs  Commonly known as: Proamatine  Next Dose Due: 8/25   1 Tablet by Enteral Tube route 3 times a day with meals.  Dose: 2.5 mg     omeprazole 2 mg/mL Susp  Commonly known as:  First-Omeprazole  Next Dose Due: 8/26   40 mg by Enteral Tube route every day.  Dose: 40 mg     Zofran 4 MG Tabs tablet  Generic drug: ondansetron  Next Dose Due: Anytime   4 mg by Enteral Tube route every 6 hours as needed for Nausea/Vomiting.  Dose: 4 mg              Allergies  No Known Allergies    DIET  Orders Placed This Encounter   Procedures    Diet NPO Restrict to: Sips with Medications     Standing Status:   Standing     Number of Occurrences:   1     Order Specific Question:   Diet NPO Restrict to:     Answer:   Sips with Medications [3]    Diet: Diet Tube Feed; Formula: Fibersource HN; Goal Rate (mL/Hour): 65; Duration: 24 HR; Tube Feed Time Unit: Hours/Day     Start @ 25 mL/hr and advance per protocol to goal rate of 65 mL/hr.     Standing Status:   Standing     Number of Occurrences:   1     Order Specific Question:   Diet     Answer:   Diet Tube Feed [35]     Order Specific Question:   Formula:     Answer:   Fibersource HN     Order Specific Question:   Goal Rate (mL/Hour)     Answer:   65     Order Specific Question:   Route     Answer:   Gtube/PEG     Order Specific Question:   Duration     Answer:   24 HR     Order Specific Question:   Tube Feed Time Unit     Answer:   Hours/Day       ACTIVITY  As tolerated.  Weight bearing as tolerated    CONSULTATIONS  N/A N/A do it please thank you running    PROCEDURES  N/a    LABORATORY  Lab Results   Component Value Date    SODIUM 129 (L) 08/25/2023    POTASSIUM 3.5 (L) 08/25/2023    CHLORIDE 97 08/25/2023    CO2 23 08/25/2023    GLUCOSE 115 (H) 08/25/2023    BUN 16 08/25/2023    CREATININE 0.48 (L) 08/25/2023        Lab Results   Component Value Date    WBC 7.5 08/25/2023    HEMOGLOBIN 11.7 (L) 08/25/2023    HEMATOCRIT 35.2 (L) 08/25/2023    PLATELETCT 205 08/25/2023        Total time of the discharge process exceeds 45 minutes.

## 2023-08-26 LAB
BACTERIA BLD CULT: NORMAL
BACTERIA BLD CULT: NORMAL
SIGNIFICANT IND 70042: NORMAL
SIGNIFICANT IND 70042: NORMAL
SITE SITE: NORMAL
SITE SITE: NORMAL
SOURCE SOURCE: NORMAL
SOURCE SOURCE: NORMAL

## 2023-09-18 ENCOUNTER — TELEPHONE (OUTPATIENT)
Dept: HEALTH INFORMATION MANAGEMENT | Facility: OTHER | Age: 78
End: 2023-09-18
Payer: MEDICARE

## 2023-10-25 ENCOUNTER — TELEPHONE (OUTPATIENT)
Dept: OCCUPATIONAL THERAPY | Facility: MEDICAL CENTER | Age: 78
End: 2023-10-25

## 2023-10-25 NOTE — THERAPY
Attempted to call patient without success (phone number is disconnected or no longer in service). Modified [place] Score 7.
